# Patient Record
Sex: FEMALE | Race: WHITE | HISPANIC OR LATINO | URBAN - METROPOLITAN AREA
[De-identification: names, ages, dates, MRNs, and addresses within clinical notes are randomized per-mention and may not be internally consistent; named-entity substitution may affect disease eponyms.]

---

## 2023-06-16 ENCOUNTER — EMERGENCY (EMERGENCY)
Facility: HOSPITAL | Age: 83
LOS: 1 days | Discharge: HOME | End: 2023-06-16
Attending: EMERGENCY MEDICINE | Admitting: EMERGENCY MEDICINE
Payer: MEDICARE

## 2023-06-16 VITALS
SYSTOLIC BLOOD PRESSURE: 123 MMHG | OXYGEN SATURATION: 99 % | DIASTOLIC BLOOD PRESSURE: 57 MMHG | HEART RATE: 75 BPM | TEMPERATURE: 98 F | RESPIRATION RATE: 18 BRPM

## 2023-06-16 VITALS
OXYGEN SATURATION: 99 % | SYSTOLIC BLOOD PRESSURE: 152 MMHG | HEART RATE: 74 BPM | RESPIRATION RATE: 18 BRPM | DIASTOLIC BLOOD PRESSURE: 37 MMHG | TEMPERATURE: 98 F

## 2023-06-16 LAB
ALBUMIN SERPL ELPH-MCNC: 4.1 G/DL — SIGNIFICANT CHANGE UP (ref 3.3–5)
ALP SERPL-CCNC: 88 U/L — SIGNIFICANT CHANGE UP (ref 40–120)
ALT FLD-CCNC: 8 U/L — SIGNIFICANT CHANGE UP (ref 4–33)
ANION GAP SERPL CALC-SCNC: 14 MMOL/L — SIGNIFICANT CHANGE UP (ref 7–14)
APPEARANCE UR: CLEAR — SIGNIFICANT CHANGE UP
AST SERPL-CCNC: 17 U/L — SIGNIFICANT CHANGE UP (ref 4–32)
BASOPHILS # BLD AUTO: 0.04 K/UL — SIGNIFICANT CHANGE UP (ref 0–0.2)
BASOPHILS NFR BLD AUTO: 0.6 % — SIGNIFICANT CHANGE UP (ref 0–2)
BILIRUB SERPL-MCNC: <0.2 MG/DL — SIGNIFICANT CHANGE UP (ref 0.2–1.2)
BILIRUB UR-MCNC: NEGATIVE — SIGNIFICANT CHANGE UP
BUN SERPL-MCNC: 25 MG/DL — HIGH (ref 7–23)
CALCIUM SERPL-MCNC: 9.1 MG/DL — SIGNIFICANT CHANGE UP (ref 8.4–10.5)
CHLORIDE SERPL-SCNC: 104 MMOL/L — SIGNIFICANT CHANGE UP (ref 98–107)
CO2 SERPL-SCNC: 19 MMOL/L — LOW (ref 22–31)
COLOR SPEC: SIGNIFICANT CHANGE UP
CREAT SERPL-MCNC: 1.49 MG/DL — HIGH (ref 0.5–1.3)
DIFF PNL FLD: NEGATIVE — SIGNIFICANT CHANGE UP
EGFR: 34 ML/MIN/1.73M2 — LOW
EOSINOPHIL # BLD AUTO: 0.02 K/UL — SIGNIFICANT CHANGE UP (ref 0–0.5)
EOSINOPHIL NFR BLD AUTO: 0.3 % — SIGNIFICANT CHANGE UP (ref 0–6)
GAS PNL BLDV: SIGNIFICANT CHANGE UP
GLUCOSE SERPL-MCNC: 148 MG/DL — HIGH (ref 70–99)
GLUCOSE UR QL: NEGATIVE — SIGNIFICANT CHANGE UP
HCT VFR BLD CALC: 34.1 % — LOW (ref 34.5–45)
HGB BLD-MCNC: 10.8 G/DL — LOW (ref 11.5–15.5)
IANC: 5.22 K/UL — SIGNIFICANT CHANGE UP (ref 1.8–7.4)
IMM GRANULOCYTES NFR BLD AUTO: 0.3 % — SIGNIFICANT CHANGE UP (ref 0–0.9)
KETONES UR-MCNC: NEGATIVE — SIGNIFICANT CHANGE UP
LEUKOCYTE ESTERASE UR-ACNC: ABNORMAL
LYMPHOCYTES # BLD AUTO: 1.34 K/UL — SIGNIFICANT CHANGE UP (ref 1–3.3)
LYMPHOCYTES # BLD AUTO: 19.1 % — SIGNIFICANT CHANGE UP (ref 13–44)
MCHC RBC-ENTMCNC: 29 PG — SIGNIFICANT CHANGE UP (ref 27–34)
MCHC RBC-ENTMCNC: 31.7 GM/DL — LOW (ref 32–36)
MCV RBC AUTO: 91.7 FL — SIGNIFICANT CHANGE UP (ref 80–100)
MONOCYTES # BLD AUTO: 0.38 K/UL — SIGNIFICANT CHANGE UP (ref 0–0.9)
MONOCYTES NFR BLD AUTO: 5.4 % — SIGNIFICANT CHANGE UP (ref 2–14)
NEUTROPHILS # BLD AUTO: 5.22 K/UL — SIGNIFICANT CHANGE UP (ref 1.8–7.4)
NEUTROPHILS NFR BLD AUTO: 74.3 % — SIGNIFICANT CHANGE UP (ref 43–77)
NITRITE UR-MCNC: NEGATIVE — SIGNIFICANT CHANGE UP
NRBC # BLD: 0 /100 WBCS — SIGNIFICANT CHANGE UP (ref 0–0)
NRBC # FLD: 0 K/UL — SIGNIFICANT CHANGE UP (ref 0–0)
PH UR: 6 — SIGNIFICANT CHANGE UP (ref 5–8)
PLATELET # BLD AUTO: 332 K/UL — SIGNIFICANT CHANGE UP (ref 150–400)
POTASSIUM SERPL-MCNC: 4.7 MMOL/L — SIGNIFICANT CHANGE UP (ref 3.5–5.3)
POTASSIUM SERPL-SCNC: 4.7 MMOL/L — SIGNIFICANT CHANGE UP (ref 3.5–5.3)
PROT SERPL-MCNC: 7 G/DL — SIGNIFICANT CHANGE UP (ref 6–8.3)
PROT UR-MCNC: ABNORMAL
RBC # BLD: 3.72 M/UL — LOW (ref 3.8–5.2)
RBC # FLD: 13.4 % — SIGNIFICANT CHANGE UP (ref 10.3–14.5)
SODIUM SERPL-SCNC: 137 MMOL/L — SIGNIFICANT CHANGE UP (ref 135–145)
SP GR SPEC: 1.01 — SIGNIFICANT CHANGE UP (ref 1.01–1.05)
UROBILINOGEN FLD QL: SIGNIFICANT CHANGE UP
WBC # BLD: 7.02 K/UL — SIGNIFICANT CHANGE UP (ref 3.8–10.5)
WBC # FLD AUTO: 7.02 K/UL — SIGNIFICANT CHANGE UP (ref 3.8–10.5)

## 2023-06-16 PROCEDURE — 99284 EMERGENCY DEPT VISIT MOD MDM: CPT

## 2023-06-16 RX ORDER — SODIUM CHLORIDE 9 MG/ML
500 INJECTION INTRAMUSCULAR; INTRAVENOUS; SUBCUTANEOUS ONCE
Refills: 0 | Status: COMPLETED | OUTPATIENT
Start: 2023-06-16 | End: 2023-06-16

## 2023-06-16 RX ADMIN — SODIUM CHLORIDE 500 MILLILITER(S): 9 INJECTION INTRAMUSCULAR; INTRAVENOUS; SUBCUTANEOUS at 16:52

## 2023-06-16 NOTE — ED ADULT NURSE NOTE - NSFALLUNIVINTERV_ED_ALL_ED
Bed/Stretcher in lowest position, wheels locked, appropriate side rails in place/Call bell, personal items and telephone in reach/Instruct patient to call for assistance before getting out of bed/chair/stretcher/Non-slip footwear applied when patient is off stretcher/[place] to call system/Physically safe environment - no spills, clutter or unnecessary equipment/Purposeful proactive rounding/Room/bathroom lighting operational, light cord in reach

## 2023-06-16 NOTE — ED PROVIDER NOTE - DIFFERENTIAL DIAGNOSIS
Differential Diagnosis medication-induced (pt taking DM medications but not eating as much as usual), dehydration, infection

## 2023-06-16 NOTE — ED ADULT NURSE NOTE - OBJECTIVE STATEMENT
Pt received to rm 29 presents with confusion, was found to be hypoglycemic to 59 in the field. Pt currently a&ox4, ambulatory at baseline, skin intact, respirations even and unlabored, abd soft and non-distended, nontender to palpation. Pt blood glucose currently 151. 20g IV placed in left arm, labs drawn and sent, will await further orders and continue to monitor.

## 2023-06-16 NOTE — ED PROVIDER NOTE - NS ED ATTENDING STATEMENT MOD
No
This was a shared visit with the KIM. I reviewed and verified the documentation and independently performed the documented:

## 2023-06-16 NOTE — ED PROVIDER NOTE - CLINICAL SUMMARY MEDICAL DECISION MAKING FREE TEXT BOX
82-year-old female with past medical history of hypertension, diabetes on glipizide, no insulin, hypothyroidism, depression on amitriptyline presents emergency room for hypoglycemia. Dizziness and off balance, FS 50s, symptoms improved after getting dextrose. Denies hitting her head or LOC. Vital signs stable, FS 70 > 151, no focal deficit. Likely hypoglycemia secondary to sulfonylurea and poor po intake. Will check basic labs, urine, monitor FS and reassess. Dispo pending results.

## 2023-06-16 NOTE — ED PROVIDER NOTE - OBJECTIVE STATEMENT
Progress Note    Admit Date: 7/8/2020  Day: 1  Diet: Diet NPO, After Midnight    CC: Chest pain    Interval history: No overnight events since admission. Vitals stable and WNL (-133/66-70, T 97.9-98.1, HR 62-76, RR 16, SaO2 97-98% room air). 0.1 L urine output in the last 24 hours. Troponin < 0.01 x 2. Anemia (HgB 11.7). Low-normal potassium (K 3.6). This AM patient is not having chest pain and admits constipation, hand/foot swelling, burning/numbness in the distal extremities (chronic). Her chest pain appears to be reproduced with left arm movement. She denies SOB, abdominal pain, nausea, vomiting and diarrhea. Possible stress test today pending Covid test result.      Medications:     Scheduled Meds:   aspirin  81 mg Oral Daily    atorvastatin  10 mg Oral Nightly    docusate sodium  100 mg Oral TID    cetirizine  10 mg Oral Daily    hydroCHLOROthiazide  25 mg Oral Daily    insulin glargine  25 Units Subcutaneous Nightly    losartan  100 mg Oral Daily    pantoprazole  40 mg Oral QAM AC    pregabalin  50 mg Oral BID    traZODone  50 mg Oral Nightly    sodium chloride flush  10 mL Intravenous 2 times per day    insulin lispro  0-6 Units Subcutaneous TID WC    insulin lispro  0-3 Units Subcutaneous Nightly    enoxaparin  40 mg Subcutaneous Daily     Continuous Infusions:   dextrose       PRN Meds:sodium chloride flush, acetaminophen **OR** acetaminophen, polyethylene glycol, promethazine **OR** ondansetron, glucose, dextrose, glucagon (rDNA), dextrose, regadenoson, nitroGLYCERIN, morphine    Objective:   Vitals:   T-max:  Patient Vitals for the past 8 hrs:   BP Temp Temp src Pulse Resp SpO2 Weight   07/09/20 0910 (!) 140/69 97.8 °F (36.6 °C) Oral 78 16 98 % --   07/09/20 0537 133/70 97.9 °F (36.6 °C) Oral 62 16 97 % 190 lb 11.2 oz (86.5 kg)       Intake/Output Summary (Last 24 hours) at 7/9/2020 1018  Last data filed at 7/9/2020 0537  Gross per 24 hour   Intake 240 ml   Output 100 ml   Net 140 ml Review of Systems   Constitutional: Negative for fatigue and fever. HENT: Negative for ear pain and sinus pain. Eyes: Negative for pain. Respiratory: Negative for cough and shortness of breath. Cardiovascular: Positive for chest pain. Chest pain appears to be partially reproduced with left arm movement. Gastrointestinal: Positive for constipation. Negative for abdominal pain and diarrhea. Endocrine: Negative for polyuria. Genitourinary: Negative for flank pain and pelvic pain. Musculoskeletal: Negative for back pain. Skin: Negative for color change. Neurological: Positive for numbness. Negative for dizziness and headaches. Psychiatric/Behavioral: Negative for agitation, behavioral problems and confusion. Physical Exam  Constitutional:       General: She is awake. She is not in acute distress. Appearance: Normal appearance. HENT:      Head: Normocephalic and atraumatic. Nose: Nose normal.   Eyes:      General: Vision grossly intact. Gaze aligned appropriately. Right eye: No discharge. Left eye: No discharge. Extraocular Movements: Extraocular movements intact. Conjunctiva/sclera: Conjunctivae normal.   Neck:      Musculoskeletal: Full passive range of motion without pain, normal range of motion and neck supple. Cardiovascular:      Rate and Rhythm: Normal rate and regular rhythm. Pulses: Normal pulses. Heart sounds: Normal heart sounds. Pulmonary:      Effort: Pulmonary effort is normal.      Breath sounds: Normal breath sounds. Abdominal:      General: There is no distension. There are no signs of injury. Palpations: Abdomen is soft. Tenderness: There is no abdominal tenderness. Musculoskeletal: Normal range of motion. General: Swelling present. No deformity or signs of injury. Skin:     General: Skin is warm. Neurological:      General: No focal deficit present.       Mental Status: She is alert, oriented to person, place, and time and easily aroused. Mental status is at baseline. Motor: Motor function is intact. Coordination: Coordination is intact. Gait: Gait is intact. Psychiatric:         Attention and Perception: Attention and perception normal.         Mood and Affect: Mood and affect normal.         Speech: Speech normal.         Behavior: Behavior normal. Behavior is cooperative. Thought Content: Thought content normal.         Cognition and Memory: Cognition normal.         Judgment: Judgment normal.       LABS:    CBC:   Recent Labs     07/08/20 1819   WBC 6.8   HGB 11.7*   HCT 35.2*      MCV 92.2     Renal:    Recent Labs     07/08/20 1819 07/09/20  0438    139   K 4.2 3.6    105   CO2 25 24   BUN 12 11   CREATININE 0.6 0.7   GLUCOSE 187* 119*   CALCIUM 9.7 9.3   ANIONGAP 13 10     Hepatic:   Recent Labs     07/08/20 1819   AST 20   ALT 16   BILITOT 0.8   PROT 7.3   LABALBU 4.6   ALKPHOS 84     Troponin:   Recent Labs     07/08/20 1819 07/09/20  0438   TROPONINI <0.01 <0.01     BNP: No results for input(s): BNP in the last 72 hours. Lipids: No results for input(s): CHOL, HDL in the last 72 hours. Invalid input(s): LDLCALCU, TRIGLYCERIDE  ABGs:  No results for input(s): PHART, OJM6GWZ, PO2ART, YND7VMV, BEART, THGBART, M5RSPTGY, THT6GEK in the last 72 hours. INR:   Recent Labs     07/08/20 1819   INR 1.05     Lactate: No results for input(s): LACTATE in the last 72 hours. Cultures:  -----------------------------------------------------------------  RAD:   CT HEAD WO CONTRAST   Final Result   1. Normal noncontrast CT scan of the head. XR CHEST STANDARD (2 VW)   Final Result   1. No evidence of acute cardiopulmonary disease.        NM Cardiac Stress Test Nuclear Imaging    (Results Pending)     Assessment/Plan:     55F w/ PMH of T2DM, neuropathy, anemia, migraine, GERD, HLD, bipolar, fibromyalgia, and osteoarthritis presented 07/08/20 w/ chest pain. 1. Chest pain  - EKG (07/09/20): No ischemic changes. No new findings compared to 07/6/18.   - Troponin < 0.01 x 2.  - TTE (06/02/2014): EF 55%. No wall motion abnormalities. - Chol 181, , HDL 52, LDL 89 (01/28/20). - Plan: ASA 81 mg QD. Atorvastatin 10 mg QD. Stress test this AM pending Covid test result. 2. T2DM  - A1c 8.5 (01/28/20). - Glucose 119-187 since admit.   - Plan: Lantus 25 mg qPM. LDSSI. Hold home metformin. F/U A1c.     3. HTN  - -133/66-70.  - Plan: Losartan 100 mg QD. HCTZ 25 mg QD.    4. Neuropathy  - Lyrica 50 mg BID. 5. GERD  - Pantoprazole 40 mg QD. 6. Insomnia  - Trazodone 50 mg qPM.    7. Allergies  - Zyrtec 10 mg QD. 8. Constipation  - Colace 100 mg TID. 9. DVT PPX  - Lovenox 40 mg QD. 10. Diet  - NPO for now. 11. Discharge plan  - D/C to home when medically stable.     Code Status: Full code    Torrey Chris DO, PGY-2  07/09/20  10:18 AM    This patient has been staffed and discussed with Grady Arnett MD. 82-year-old female with past medical history of hypertension, diabetes on glipizide, no insulin, hypothyroidism, depression on amitriptyline presents emergency room for hypoglycemia.  Patient states that she went to work at the [employer] today when she was trying to check in people and she began to feel lightheaded and dizzy, like she was off balance.  Went to the managers office and sat down while they called 911.  Denies hitting her head or LOC.  States that symptoms have now improved.  When EMS arrived, patient was found to be hypoglycemic into the 50s.  Patient reports complete resolution of symptoms at this time.  Of note, niece is at bedside states that patient tried to go to work last night at 7 PM and was little confused, 911 was called for a wellness check but was fine so did not go into hospital.

## 2023-06-16 NOTE — ED PROVIDER NOTE - ATTENDING APP SHARED VISIT CONTRIBUTION OF CARE
Dr. Lin: This is an 82-year-old female with hypertension, diabetes on oral medications, hypothyroidism, depression, brought to the emergency department after episode of dizziness and hypoglycemia.  Niece at the bedside suspects that patient likely took her diabetes medications and did not eat enough, which has happened previously.  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  Patient feels complete improvement in her symptoms now that she has been given glucose.  She denies nausea, vomiting, diarrhea, chest pain, shortness of breath.  Patient last took her diabetes medications (including glipizide) earlier this morning.  On exam pt overall well appearing, in NAD, A+Ox3, heart RRR, lungs CTAB, abd NTND, extremities without swelling, strength equal and grossly intact in all extremities and skin without rash.

## 2023-06-16 NOTE — ED ADULT NURSE REASSESSMENT NOTE - NS ED NURSE REASSESS COMMENT FT1
Break coverage RN: pt A&Ox4 resting on stretcher. Respirations even and unlabored. IVF administered per order, IV site patent. No redness or swelling noted. Pt offers no complaints at this time. No acute distress noted. bed in lowest, safety maintained.

## 2023-06-16 NOTE — ED ADULT TRIAGE NOTE - CHIEF COMPLAINT QUOTE
pt reports feels dizzy and confused fo the past 2 days, per EMS, pt was found with Fs of 59 received 2  rounds of oral glucose. hx of DM, HTN. Received 2 cups of apple juice and crackers in triage.

## 2023-06-16 NOTE — ED PROVIDER NOTE - PATIENT PORTAL LINK FT
You can access the FollowMyHealth Patient Portal offered by [facility] by registering at the following website: [email or web address]. By joining [facility]’s FollowMyHealth portal, you will also be able to view your health information using other applications (apps) compatible with our system.

## 2023-06-16 NOTE — ED PROVIDER NOTE - CONSTITUTIONAL MENTATION
awake/alert/oriented to person, place, time/situation A+Ox3/awake/alert/oriented to person, place, time/situation

## 2023-06-16 NOTE — ED PROVIDER NOTE - NSFOLLOWUPINSTRUCTIONS_ED_ALL_ED_FT
Today you were seen in the ER for dizziness caused by low blood sugar.    Monitor your glucose closely. Make sure you are eating a well balanced meal.     Hold your Glipizide tomorrow morning and follow up with your endocrinologist as soon as possible.     Hypoglycemia    Hypoglycemia occurs when the glucose (sugar) level in your blood is too low. Symptoms include confusion, weakness, or fainting. You may even appear to be having a stroke. Take medications exactly as prescribed by your health care professional. Maintain a healthy lifestyle and follow up with your primary care physician.    SEEK IMMEDIATE MEDICAL CARE IF YOU HAVE ANY OF THE FOLLOWING SYMPTOMS: weakness, fainting, change in mental status, nausea or vomiting, fruity smell to your breath, or any signs of dehydration.    Advance activity as tolerated.      Continue all previously prescribed medications as directed unless otherwise instructed.      Follow up with your primary care physician in 48-72 hours- bring copies of your results.

## 2023-06-16 NOTE — ED PROVIDER NOTE - PROGRESS NOTE DETAILS
SNEHA Araiza: PARVEEN on labs, will give light hydration, FS stable, will continue to monitor, patient eating/drinking, carol Roque will be staying with patient tonight and monitor closely, will hold sulfonylurea and have patient follow up with endo early next week to review medications as patient is not eating/drinking well so medications will likely need to be adjusted.

## 2023-06-18 LAB
CULTURE RESULTS: SIGNIFICANT CHANGE UP
SPECIMEN SOURCE: SIGNIFICANT CHANGE UP

## 2024-02-28 ENCOUNTER — INPATIENT (INPATIENT)
Facility: HOSPITAL | Age: 84
LOS: 4 days | Discharge: HOME HEALTH | DRG: 884 | End: 2024-03-04
Attending: INTERNAL MEDICINE | Admitting: INTERNAL MEDICINE
Payer: MEDICARE

## 2024-02-28 VITALS
SYSTOLIC BLOOD PRESSURE: 143 MMHG | RESPIRATION RATE: 18 BRPM | HEART RATE: 70 BPM | TEMPERATURE: 98 F | DIASTOLIC BLOOD PRESSURE: 52 MMHG | WEIGHT: 95.02 LBS | OXYGEN SATURATION: 97 %

## 2024-02-28 DIAGNOSIS — R55 SYNCOPE AND COLLAPSE: ICD-10-CM

## 2024-02-28 LAB
ALBUMIN SERPL ELPH-MCNC: 4.3 G/DL — SIGNIFICANT CHANGE UP (ref 3.3–5)
ALP SERPL-CCNC: 64 U/L — SIGNIFICANT CHANGE UP (ref 40–120)
ALT FLD-CCNC: 6 U/L — LOW (ref 10–45)
ANION GAP SERPL CALC-SCNC: 13 MMOL/L — SIGNIFICANT CHANGE UP (ref 5–17)
ANION GAP SERPL CALC-SCNC: 15 MMOL/L — SIGNIFICANT CHANGE UP (ref 5–17)
APPEARANCE UR: CLEAR — SIGNIFICANT CHANGE UP
AST SERPL-CCNC: 19 U/L — SIGNIFICANT CHANGE UP (ref 10–40)
BACTERIA # UR AUTO: NEGATIVE /HPF — SIGNIFICANT CHANGE UP
BASOPHILS # BLD AUTO: 0.02 K/UL — SIGNIFICANT CHANGE UP (ref 0–0.2)
BASOPHILS NFR BLD AUTO: 0.2 % — SIGNIFICANT CHANGE UP (ref 0–2)
BILIRUB SERPL-MCNC: 0.4 MG/DL — SIGNIFICANT CHANGE UP (ref 0.2–1.2)
BILIRUB UR-MCNC: NEGATIVE — SIGNIFICANT CHANGE UP
BUN SERPL-MCNC: 25 MG/DL — HIGH (ref 7–23)
BUN SERPL-MCNC: 27 MG/DL — HIGH (ref 7–23)
CALCIUM SERPL-MCNC: 9.4 MG/DL — SIGNIFICANT CHANGE UP (ref 8.4–10.5)
CALCIUM SERPL-MCNC: 9.6 MG/DL — SIGNIFICANT CHANGE UP (ref 8.4–10.5)
CAST: 11 /LPF — HIGH (ref 0–4)
CHLORIDE SERPL-SCNC: 100 MMOL/L — SIGNIFICANT CHANGE UP (ref 96–108)
CHLORIDE SERPL-SCNC: 103 MMOL/L — SIGNIFICANT CHANGE UP (ref 96–108)
CO2 SERPL-SCNC: 21 MMOL/L — LOW (ref 22–31)
CO2 SERPL-SCNC: 21 MMOL/L — LOW (ref 22–31)
COLOR SPEC: YELLOW — SIGNIFICANT CHANGE UP
CREAT SERPL-MCNC: 1.41 MG/DL — HIGH (ref 0.5–1.3)
CREAT SERPL-MCNC: 1.61 MG/DL — HIGH (ref 0.5–1.3)
DIFF PNL FLD: NEGATIVE — SIGNIFICANT CHANGE UP
EGFR: 31 ML/MIN/1.73M2 — LOW
EGFR: 37 ML/MIN/1.73M2 — LOW
EOSINOPHIL # BLD AUTO: 0.01 K/UL — SIGNIFICANT CHANGE UP (ref 0–0.5)
EOSINOPHIL NFR BLD AUTO: 0.1 % — SIGNIFICANT CHANGE UP (ref 0–6)
FLUAV AG NPH QL: SIGNIFICANT CHANGE UP
FLUBV AG NPH QL: SIGNIFICANT CHANGE UP
GLUCOSE BLDC GLUCOMTR-MCNC: 104 MG/DL — HIGH (ref 70–99)
GLUCOSE BLDC GLUCOMTR-MCNC: 64 MG/DL — LOW (ref 70–99)
GLUCOSE SERPL-MCNC: 120 MG/DL — HIGH (ref 70–99)
GLUCOSE SERPL-MCNC: 77 MG/DL — SIGNIFICANT CHANGE UP (ref 70–99)
GLUCOSE UR QL: NEGATIVE MG/DL — SIGNIFICANT CHANGE UP
HCT VFR BLD CALC: 33.1 % — LOW (ref 34.5–45)
HGB BLD-MCNC: 10.7 G/DL — LOW (ref 11.5–15.5)
IMM GRANULOCYTES NFR BLD AUTO: 0.3 % — SIGNIFICANT CHANGE UP (ref 0–0.9)
KETONES UR-MCNC: ABNORMAL MG/DL
LEUKOCYTE ESTERASE UR-ACNC: NEGATIVE — SIGNIFICANT CHANGE UP
LYMPHOCYTES # BLD AUTO: 1.41 K/UL — SIGNIFICANT CHANGE UP (ref 1–3.3)
LYMPHOCYTES # BLD AUTO: 15.5 % — SIGNIFICANT CHANGE UP (ref 13–44)
MAGNESIUM SERPL-MCNC: 2.2 MG/DL — SIGNIFICANT CHANGE UP (ref 1.6–2.6)
MCHC RBC-ENTMCNC: 29.2 PG — SIGNIFICANT CHANGE UP (ref 27–34)
MCHC RBC-ENTMCNC: 32.3 GM/DL — SIGNIFICANT CHANGE UP (ref 32–36)
MCV RBC AUTO: 90.2 FL — SIGNIFICANT CHANGE UP (ref 80–100)
MONOCYTES # BLD AUTO: 0.34 K/UL — SIGNIFICANT CHANGE UP (ref 0–0.9)
MONOCYTES NFR BLD AUTO: 3.7 % — SIGNIFICANT CHANGE UP (ref 2–14)
NEUTROPHILS # BLD AUTO: 7.28 K/UL — SIGNIFICANT CHANGE UP (ref 1.8–7.4)
NEUTROPHILS NFR BLD AUTO: 80.2 % — HIGH (ref 43–77)
NITRITE UR-MCNC: NEGATIVE — SIGNIFICANT CHANGE UP
NRBC # BLD: 0 /100 WBCS — SIGNIFICANT CHANGE UP (ref 0–0)
PH UR: 5 — SIGNIFICANT CHANGE UP (ref 5–8)
PHOSPHATE SERPL-MCNC: 3.8 MG/DL — SIGNIFICANT CHANGE UP (ref 2.5–4.5)
PLATELET # BLD AUTO: 271 K/UL — SIGNIFICANT CHANGE UP (ref 150–400)
POTASSIUM SERPL-MCNC: 4.9 MMOL/L — SIGNIFICANT CHANGE UP (ref 3.5–5.3)
POTASSIUM SERPL-MCNC: 5.4 MMOL/L — HIGH (ref 3.5–5.3)
POTASSIUM SERPL-SCNC: 4.9 MMOL/L — SIGNIFICANT CHANGE UP (ref 3.5–5.3)
POTASSIUM SERPL-SCNC: 5.4 MMOL/L — HIGH (ref 3.5–5.3)
PROT SERPL-MCNC: 7.8 G/DL — SIGNIFICANT CHANGE UP (ref 6–8.3)
PROT UR-MCNC: 30 MG/DL
RBC # BLD: 3.67 M/UL — LOW (ref 3.8–5.2)
RBC # FLD: 15.3 % — HIGH (ref 10.3–14.5)
RBC CASTS # UR COMP ASSIST: 0 /HPF — SIGNIFICANT CHANGE UP (ref 0–4)
REVIEW: SIGNIFICANT CHANGE UP
RSV RNA NPH QL NAA+NON-PROBE: SIGNIFICANT CHANGE UP
SARS-COV-2 RNA SPEC QL NAA+PROBE: SIGNIFICANT CHANGE UP
SODIUM SERPL-SCNC: 134 MMOL/L — LOW (ref 135–145)
SODIUM SERPL-SCNC: 139 MMOL/L — SIGNIFICANT CHANGE UP (ref 135–145)
SP GR SPEC: 1.02 — SIGNIFICANT CHANGE UP (ref 1–1.03)
SQUAMOUS # UR AUTO: 1 /HPF — SIGNIFICANT CHANGE UP (ref 0–5)
TROPONIN T, HIGH SENSITIVITY RESULT: 18 NG/L — SIGNIFICANT CHANGE UP (ref 0–51)
TSH SERPL-MCNC: 0.5 UIU/ML — SIGNIFICANT CHANGE UP (ref 0.27–4.2)
UROBILINOGEN FLD QL: 1 MG/DL — SIGNIFICANT CHANGE UP (ref 0.2–1)
WBC # BLD: 9.09 K/UL — SIGNIFICANT CHANGE UP (ref 3.8–10.5)
WBC # FLD AUTO: 9.09 K/UL — SIGNIFICANT CHANGE UP (ref 3.8–10.5)
WBC UR QL: 1 /HPF — SIGNIFICANT CHANGE UP (ref 0–5)

## 2024-02-28 PROCEDURE — 70496 CT ANGIOGRAPHY HEAD: CPT | Mod: 26,MC

## 2024-02-28 PROCEDURE — 99285 EMERGENCY DEPT VISIT HI MDM: CPT

## 2024-02-28 PROCEDURE — 71045 X-RAY EXAM CHEST 1 VIEW: CPT | Mod: 26

## 2024-02-28 PROCEDURE — 70450 CT HEAD/BRAIN W/O DYE: CPT | Mod: 26,MC,XU

## 2024-02-28 PROCEDURE — 70498 CT ANGIOGRAPHY NECK: CPT | Mod: 26,MC

## 2024-02-28 RX ORDER — VENLAFAXINE HCL 75 MG
37.5 CAPSULE, EXT RELEASE 24 HR ORAL ONCE
Refills: 0 | Status: COMPLETED | OUTPATIENT
Start: 2024-02-28 | End: 2024-02-28

## 2024-02-28 RX ORDER — DEXTROSE 50 % IN WATER 50 %
15 SYRINGE (ML) INTRAVENOUS ONCE
Refills: 0 | Status: DISCONTINUED | OUTPATIENT
Start: 2024-02-28 | End: 2024-03-04

## 2024-02-28 RX ORDER — SODIUM CHLORIDE 9 MG/ML
1000 INJECTION, SOLUTION INTRAVENOUS ONCE
Refills: 0 | Status: COMPLETED | OUTPATIENT
Start: 2024-02-28 | End: 2024-02-28

## 2024-02-28 RX ORDER — GLUCAGON INJECTION, SOLUTION 0.5 MG/.1ML
1 INJECTION, SOLUTION SUBCUTANEOUS ONCE
Refills: 0 | Status: DISCONTINUED | OUTPATIENT
Start: 2024-02-28 | End: 2024-03-04

## 2024-02-28 RX ORDER — HEPARIN SODIUM 5000 [USP'U]/ML
5000 INJECTION INTRAVENOUS; SUBCUTANEOUS EVERY 8 HOURS
Refills: 0 | Status: DISCONTINUED | OUTPATIENT
Start: 2024-02-28 | End: 2024-03-04

## 2024-02-28 RX ORDER — DEXTROSE 50 % IN WATER 50 %
25 SYRINGE (ML) INTRAVENOUS ONCE
Refills: 0 | Status: DISCONTINUED | OUTPATIENT
Start: 2024-02-28 | End: 2024-03-04

## 2024-02-28 RX ORDER — INSULIN LISPRO 100/ML
VIAL (ML) SUBCUTANEOUS
Refills: 0 | Status: DISCONTINUED | OUTPATIENT
Start: 2024-02-28 | End: 2024-03-04

## 2024-02-28 RX ORDER — SODIUM CHLORIDE 9 MG/ML
1000 INJECTION, SOLUTION INTRAVENOUS
Refills: 0 | Status: DISCONTINUED | OUTPATIENT
Start: 2024-02-28 | End: 2024-03-04

## 2024-02-28 RX ORDER — DEXTROSE 50 % IN WATER 50 %
12.5 SYRINGE (ML) INTRAVENOUS ONCE
Refills: 0 | Status: DISCONTINUED | OUTPATIENT
Start: 2024-02-28 | End: 2024-03-04

## 2024-02-28 RX ORDER — VENLAFAXINE HCL 75 MG
75 CAPSULE, EXT RELEASE 24 HR ORAL DAILY
Refills: 0 | Status: DISCONTINUED | OUTPATIENT
Start: 2024-02-28 | End: 2024-03-04

## 2024-02-28 RX ORDER — QUETIAPINE FUMARATE 200 MG/1
25 TABLET, FILM COATED ORAL ONCE
Refills: 0 | Status: COMPLETED | OUTPATIENT
Start: 2024-02-28 | End: 2024-02-28

## 2024-02-28 RX ORDER — LEVOTHYROXINE SODIUM 125 MCG
50 TABLET ORAL DAILY
Refills: 0 | Status: DISCONTINUED | OUTPATIENT
Start: 2024-02-28 | End: 2024-03-04

## 2024-02-28 RX ORDER — INSULIN LISPRO 100/ML
VIAL (ML) SUBCUTANEOUS AT BEDTIME
Refills: 0 | Status: DISCONTINUED | OUTPATIENT
Start: 2024-02-28 | End: 2024-03-04

## 2024-02-28 RX ADMIN — QUETIAPINE FUMARATE 25 MILLIGRAM(S): 200 TABLET, FILM COATED ORAL at 22:19

## 2024-02-28 RX ADMIN — SODIUM CHLORIDE 1000 MILLILITER(S): 9 INJECTION, SOLUTION INTRAVENOUS at 12:02

## 2024-02-28 RX ADMIN — Medication 37.5 MILLIGRAM(S): at 17:59

## 2024-02-28 RX ADMIN — HEPARIN SODIUM 5000 UNIT(S): 5000 INJECTION INTRAVENOUS; SUBCUTANEOUS at 22:20

## 2024-02-28 NOTE — ED PROVIDER NOTE - ATTENDING CONTRIBUTION TO CARE
Attending MD Browning: I personally have seen and examined this patient.  Fellow note reviewed and agree on plan of care and except where noted.  See below for details.     Seen in     TO BE COMPLETED

## 2024-02-28 NOTE — H&P ADULT - NSHPPHYSICALEXAM_GEN_ALL_CORE
General: WN/WD NAD  PERRLA  Neurology: A&Ox1  Respiratory: CTA B/L  CV: RRR, S1S2, no murmurs, rubs or gallops  Abdominal: Soft, NT, ND +BS, Last BM  Extremities: edema+

## 2024-02-28 NOTE — ED PROVIDER NOTE - PROGRESS NOTE DETAILS
Attending MD Browning: Called to bedside by Mya lam, who reports patient is reporting feeling short of breath.  Patient laying in stretcher, no increased work of breathing, RR in teens, Sat'ing 100% on RA.  Instructed patient to take a few deep breaths with improvement of symptoms. Gabriela: spoke with neuro they will see pt, consult to see if pt should stay for MRI  Paged nsg for aneurysm though likely will be nothing to do Gabriela: spoke with neuro they will see pt, consult to see if pt should stay for MRI  Paged nsg for aneurysm though likely will be nothing to do    Spoke with HCP and we think the pt should be admitted as she's an unsafe discharge, pt doesn't have a dementia doctor and pts dementia is rapidly progressing, unclear if pt has PARVEEN unknown baseline Cr. Pt slightly hypoglycemic here, likely from fasting as pt not on hypoglycemic med pt not symptomatic and tolerating PO will rpt FS Gabriela: haven't heard from nsg, paged again, called [facility] and they're signing out  Pts HCP asking to give pt something to help her sleep in the evening before HCP leaves. Will give a small dose of quetiapine Gabriela: was able to speak with neurosurgery about pts aneurysm on CT, they're aware of the pt and will see pt

## 2024-02-28 NOTE — ED ADULT NURSE NOTE - OBJECTIVE STATEMENT
84 y/o F A&Ox2 w/ PMH of hypothyroidism, DM presents to ED complaining of near syncopal episode. Pt BIBEMS from home, per EMS, patient was trying to read her blood glucose and she was unable to get the glucose reading so she called her caretaker who lives down the block to come help her the patient then states afterwards she started to feel a little short of breath and a little lightheaded when she sat on the couch and she said she this for short period of time lost consciousness.  The patient denies being on insulin. Per pt caregiver, pt "has not been herself" the last 2 days. Caretaker noted slight right sided facial droop and states that when the pt called her, she "didn't sound herself." Pt gross neuro intact, strength and sensation intact. Patient denies any fevers or chills, URI symptoms, cough, chest pain, palpitations, abdominal pain, nausea or vomiting, diarrhea, dark or bloody stools, dysuria or urinary urgency, leg swelling, rash.

## 2024-02-28 NOTE — H&P ADULT - ASSESSMENT
83 yr  woman with a PMHx significant for dementia (aa0x2 baseline), hypothyroidism, HTN, DM presented to the ED for acute on chronic cognitive changes and aphasia. Patient accompanied by HCP/caretaker who aided in history. As per HCP, she received a call from the patient yesterday stating "I passed out". Patient was found on her couch with her eyes closed, awake but more confused compared to baseline. Patient stated that she went to the kitchen to test her sugar and began feeling dizzy and "passed out" however never lost consciousness. No shaking noted during the episode, denied HA, numbness, acute weakness, vision changes. Patient complained of transient left eye pain and malaise.  HCP noted transient left sided facial droop.     1 AMS, worsening dementia, FTT   - likely worsening dementia  - neuro fu appreciated  - psych consult in am  - PT and rehab planing       2 DM  - monitor FS  - ISS    3 Lovenox for DVT pophylaxis  83 yr  woman with a PMHx significant for dementia (aa0x2 baseline), hypothyroidism, HTN, DM presented to the ED for acute on chronic cognitive changes and aphasia. Patient accompanied by HCP/caretaker who aided in history. As per HCP, she received a call from the patient yesterday stating "I passed out". Patient was found on her couch with her eyes closed, awake but more confused compared to baseline. Patient stated that she went to the kitchen to test her sugar and began feeling dizzy and "passed out" however never lost consciousness. No shaking noted during the episode, denied HA, numbness, acute weakness, vision changes. Patient complained of transient left eye pain and malaise.  HCP noted transient left sided facial droop.     1 AMS, worsening dementia, FTT   - likely worsening dementia  - neuro fu appreciated  - psych consult in am  - PT and rehab planing       2 DM  - monitor FS  - ISS    3 PARVEEN   - monitor cr  - renal fu     Heparin sc  for DVT prophylaxis

## 2024-02-28 NOTE — H&P ADULT - HISTORY OF PRESENT ILLNESS
83y (1940) RH woman with a PMHx significant for dementia (aa0x2 baseline), hypothyroidism, HTN, DM presented to the ED for acute on chronic cognitive changes and aphasia. Patient accompanied by HCP/caretaker who aided in history. As per HCP, she received a call from the patient yesterday stating "I passed out". Patient was found on her couch with her eyes closed, awake but more confused compared to baseline. Patient stated that she went to the kitchen to test her sugar and began feeling dizzy and "passed out" however never lost consciousness. No shaking noted during the episode, denied HA, numbness, acute weakness, vision changes. Patient complained of transient left eye pain and malaise.  HCP noted transient left sided facial droop.  83 yr  woman with a PMHx significant for dementia (aa0x2 baseline), hypothyroidism, HTN, DM presented to the ED for acute on chronic cognitive changes and aphasia. Patient accompanied by HCP/caretaker who aided in history. As per HCP, she received a call from the patient yesterday stating "I passed out". Patient was found on her couch with her eyes closed, awake but more confused compared to baseline. Patient stated that she went to the kitchen to test her sugar and began feeling dizzy and "passed out" however never lost consciousness. No shaking noted during the episode, denied HA, numbness, acute weakness, vision changes. Patient complained of transient left eye pain and malaise.  HCP noted transient left sided facial droop.

## 2024-02-28 NOTE — ED PROVIDER NOTE - OBJECTIVE STATEMENT
83-year-old female with past medical history of hypothyroidism on levothyroxine, type 2 diabetes on pioglitazone, presents by EMS where the story was that the patient was trying to read her blood glucose and she was unable to get the glucose reading so she called her caretaker who lives down the block to come help her the patient then states afterwards she started to feel a little short of breath and a little lightheaded when she sat on the couch and she said she this for short period of time lost consciousness.  The patient denies being on insulin.  The patient has a component of dementia she is AAO x 2 here unclear what her baseline mental status is the family is coming in shortly.  Patient denies any fevers or chills, URI symptoms, cough, chest pain, palpitations, abdominal pain, nausea or vomiting, diarrhea, dark or bloody stools, dysuria or urinary urgency, leg swelling, rash. 83-year-old female with past medical history of hypothyroidism on levothyroxine, type 2 diabetes on pioglitazone, presents by EMS where the story was that the patient was trying to read her blood glucose and she was unable to get the glucose reading so she called her caretaker who lives down the block to come help her the patient then states afterwards she started to feel a little short of breath and a little lightheaded when she sat on the couch and she said she this for short period of time lost consciousness. The patient denies being on insulin. The patient has a component of dementia she is AAO x 2 here unclear what her baseline mental status is but the HCP Mya (documentation in pts paper chart) is now here and states that for the past week she feels like the pt has been off, maybe sometimes babbling when speaking, has been saying "I feel like I have to yawn but I can't", pt has possibly a new L facial droop noticed this morning though not a clear last known well. Patient denies any fevers or chills, URI symptoms, cough, chest pain, palpitations, abdominal pain, nausea or vomiting, diarrhea, dark or bloody stools, dysuria or urinary urgency, leg swelling, rash. 83-year-old female with past medical history of hypothyroidism on levothyroxine, type 2 diabetes on pioglitazone, presents by EMS where the story was that the patient was trying to read her blood glucose and she was unable to get the glucose reading so she called her caretaker who lives down the block to come help her the patient then states afterwards she started to feel a little short of breath and a little lightheaded when she sat on the couch and she said she this for short period of time lost consciousness. The patient denies being on insulin. The patient has a component of dementia she is AAO x 2 here unclear what her baseline mental status is but the HCP Mya (documentation in pts paper chart) is now here and states that for the past week she feels like the pt has been off, maybe sometimes babbling when speaking, has been saying "I feel like I have to yawn but I can't", pt has possibly a new L facial droop noticed this morning though not a clear last known well. Patient denies any fevers or chills, URI symptoms, cough, chest pain, palpitations, abdominal pain, nausea or vomiting, diarrhea, dark or bloody stools, dysuria or urinary urgency, leg swelling, rash.    Upon speaking further with HCP/caretaker, the pts dementia seems to be rapidly progressing over the past 2 months. States pts best friend passed away in January and since then she's been declining fast and caretaker has a family to take care of and the pt should get admitted for concern she's unable to care for herself at home. Pt was started on venlafaxine 2 days ago for panic attacks. 83-year-old female with past medical history of hypothyroidism on levothyroxine, type 2 diabetes on pioglitazone, presents by EMS where the story was that the patient was trying to read her blood glucose and she was unable to get the glucose reading so she called her caretaker who lives down the block to come help her the patient then states afterwards she started to feel a little short of breath and a little lightheaded when she sat on the couch and she said she this for short period of time lost consciousness. The patient denies being on insulin. FS 90s per EMS. The patient has a component of dementia she is AAO x 2 here unclear what her baseline mental status is but the HCP Mya (documentation in pts paper chart) is now here and states that for the past week she feels like the pt has been off, maybe sometimes babbling when speaking, has been saying "I feel like I have to yawn but I can't", pt has possibly a new L facial droop noticed this morning though not a clear last known well. Patient denies any fevers or chills, URI symptoms, cough, chest pain, palpitations, abdominal pain, nausea or vomiting, diarrhea, dark or bloody stools, dysuria or urinary urgency, leg swelling, rash.    Upon speaking further with HCP/caretaker, the pts dementia seems to be rapidly progressing over the past 2 months. States pts best friend passed away in January and since then she's been declining fast and caretaker has a family to take care of and the pt should get admitted for concern she's unable to care for herself at home. Pt was started on venlafaxine 2 days ago for panic attacks.

## 2024-02-28 NOTE — ED ADULT NURSE NOTE - ISOLATION TYPE:
"              After Visit Summary   3/23/2018    Zbigniew Dumas    MRN: [id number]           Patient Information     Date Of Birth          2014        Visit Information        Provider Department      3/23/2018 9:15 AM Ceci Grimm MD [facility]        Today's Diagnoses     Molluscum contagiosum    -  1       Follow-ups after your visit        Who to contact     If you have questions or need follow up information about today's clinic visit or your schedule please contact [facility] directly at [phone].  Normal or non-critical lab and imaging results will be communicated to you by [employer] [facility], letter or phone within 4 business days after the clinic has received the results. If you do not hear from us within 7 days, please contact the clinic through [employer] or phone. If you have a critical or abnormal lab result, we will notify you by phone as soon as possible.  Submit refill requests through [employer] or call your pharmacy and they will forward the refill request to us. Please allow 3 business days for your refill to be completed.          Additional Information About Your Visit        MyChart Information     [employer] lets you send messages to your doctor, view your test results, renew your prescriptions, schedule appointments and more. To sign up, go to [email or web address], contact your [facility] clinic or call [phone] during business hours.            Care EveryWhere ID     This is your Care EveryWhere ID. This could be used by other organizations to access your [facility] medical records  [id number]        Your Vitals Were     Pulse Temperature Height Pulse Oximetry BMI (Body Mass Index)       85 98.2  F (36.8  C) (Oral) 3' 10\" (1.168 m) 97% 15.95 kg/m2        Blood Pressure from Last 3 Encounters:   03/23/18 106/63   10/27/17 98/72   10/24/17 101/69    Weight from Last 3 Encounters:   03/23/18 48 lb (21.8 kg) (80 %)*   10/27/17 44 lb 9.6 oz (20.2 kg) (76 %)* "   10/24/17 44 lb (20 kg) (74 %)*     * Growth percentiles are based on [facility] 2-20 Years data.              We Performed the Following     DESTRUCT BENIGN LESION, UP TO 14        Primary Care Provider Office Phone # Fax #    Ceci Grimm [phone]       [address]  [facility] [address]        Equal Access to Services     [facility]: Hadii aad ku hadasho Soomaali, waaxda luqadaha, qaybta kaalmada adeegyada, waxay idiin hayaan adeeg kharash la'aan . So [facility] [phone].    ATENCIÓN: Si habla espelaine, tiene a mcrae disposición servicios gratuitos de asistencia lingüística. Llame al [phone].    We comply with applicable federal civil rights laws and [place] laws. We do not discriminate on the basis of race, color, national origin, age, disability, sex, sexual orientation, or gender identity.            Thank you!     Thank you for choosing [facility]  for your care. Our goal is always to provide you with excellent care. Hearing back from our patients is one way we can continue to improve our services. Please take a few minutes to complete the written survey that you may receive in the mail after your visit with us. Thank you!             Your Updated Medication List - Protect others around you: Learn how to safely use, store and throw away your medicines at www.disposemymeds.org.          This list is accurate as of 3/23/18 10:47 AM.  Always use your most recent med list.                   Brand Name Dispense Instructions for use Diagnosis    acetaminophen 32 mg/mL solution    TYLENOL     Take 15 mg/kg by mouth every 4 hours as needed for fever or mild pain        ADVIL PO           Multi-vitamin Tabs tablet      Take 1 tablet by mouth daily    Encounter for routine child health examination w/o abnormal findings          None

## 2024-02-28 NOTE — ED PROVIDER NOTE - CLINICAL SUMMARY MEDICAL DECISION MAKING FREE TEXT BOX
Gabriela: Impression: Likely orthostatic hypotension from being dehydrated secondary to type 2 diabetes, though will assess for infectious causes, patient denies any room spinning dizziness no nystagmus here low suspicion for vertigo or primary neuro etiology as she also has no focal neurologic deficits.  Plan for fingerstick, labs, chest x-ray, EKG and Trope, urine, viral swab, gentle fluids.  Reassess. Gabriela: Impression: Possible subacute CVA (difficult to discern LKN as pts been off for the past week per HCP/caretaker) though will assess for infectious/metabolic causes, patient denies any room spinning dizziness no nystagmus here low suspicion for vertigo or primary neuro etiology as she also has no focal neurologic deficits.  Plan for CT's/CTA's/ fingerstick, labs, chest x-ray, EKG and Trop, urine, viral swab, gentle fluids.  Reassess.

## 2024-02-28 NOTE — CONSULT NOTE ADULT - ATTENDING COMMENTS
DOS 2/29  seen in ED  Briefly    83y RH woman with dementia (aa0x2 baseline), hypothyroidism, HTN, DM presented to the ED for acute on chronic cognitive changes and aphasia. Patient accompanied by HCP/caretaker who aided in history. As per HCP, she received a call from the patient yesterday stating "I passed out". Patient was found on her couch with her eyes closed, awake but more confused compared to baseline. Patient stated that she went to the kitchen to test her sugar and began feeling dizzy and "passed out" however never lost consciousness. No shaking noted during the episode, denied HA, numbness, acute weakness, vision changes. Patient complained of transient left eye pain and malaise.  HCP noted transient left sided facial droop.   As per HCP, on 1/24 patient lost her best friend and since that date had significant cognitive decline, difficulty taking care of herself, loss of interest in activities.   HCP stated 7 days ago began noticing speech stuttering and worsening forgetfulness however no focal deficits noted.   As per HCP, patient was dx with dementia by PCP in the past as she was having forgetfulness. Had not had MRI brain in the past. Patient is not on aspirin. no toxic habits.   CTh with advanced chronic microvascular changes   CTA H/N with 4.9x3.8mm aneurysm with wide 3mm neck   CD neg     Impression:   1) Acute on chronic cognitive decline with superimposed dementia, with possible broca type aphasia for the past one week concerning for cerebral ischemia vs toxic/metabolic/infectious etiology.   2) AMS  3) Syncope of unclear etiology.     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        Recommendations:     Imaging/Labs  - MRI brain  - neurosx eval   - TTE with bubble study and telemetry   - Check UA, Utox, TSH, T3/T4, RPR, antithyroglobulin Ab, TPO Ab, vitamin B1, B6, B12, folate, lactate, Vitamin D 25 OH, creatinine kinase, ammonia, HbA1C and Lipid Panel.  - Orthostatics  - f/u psych   - seroquel PRN for agitation make sure QTC < 500   - routine EEG   - Would start aspirin 81MG PO daily as secondary prevention. IF MRI negative can consider discontinuing   - Would start Atorvastatin 80MG QHS, titrate to LDL<70.  IF MRI negative can consider discontinuing   - Telemonitoring; Neurochecks and vital signs per unit protocol, Q4H  - Normotension   - BG goal <180, avoid hypoglycemia  - Fall, aspiration precautions  - PT/OT rudolph Zuñiga MD  Vascular Neurology  Office: [phone]

## 2024-02-28 NOTE — H&P ADULT - NSHPLABSRESULTS_GEN_ALL_CORE
Lab Results:  CBC  CBC Full  -  ( 28 Feb 2024 12:16 )  WBC Count : 9.09 K/uL  RBC Count : 3.67 M/uL  Hemoglobin : 10.7 g/dL  Hematocrit : 33.1 %  Platelet Count - Automated : 271 K/uL  Mean Cell Volume : 90.2 fl  Mean Cell Hemoglobin : 29.2 pg  Mean Cell Hemoglobin Concentration : 32.3 gm/dL  Auto Neutrophil # : 7.28 K/uL  Auto Lymphocyte # : 1.41 K/uL  Auto Monocyte # : 0.34 K/uL  Auto Eosinophil # : 0.01 K/uL  Auto Basophil # : 0.02 K/uL  Auto Neutrophil % : 80.2 %  Auto Lymphocyte % : 15.5 %  Auto Monocyte % : 3.7 %  Auto Eosinophil % : 0.1 %  Auto Basophil % : 0.2 %    .		Differential:	[] Automated		[] Manual  Chemistry                        10.7   9.09  )-----------( 271      ( 28 Feb 2024 12:16 )             33.1     02-28    134<L>  |  100  |  25<H>  ----------------------------<  120<H>  4.9   |  21<L>  |  1.41<H>    Ca    9.4      28 Feb 2024 16:23  Phos  3.8     02-28  Mg     2.2     02-28    TPro  7.8  /  Alb  4.3  /  TBili  0.4  /  DBili  x   /  AST  19  /  ALT  6<L>  /  AlkPhos  64  02-28    LIVER FUNCTIONS - ( 28 Feb 2024 12:16 )  Alb: 4.3 g/dL / Pro: 7.8 g/dL / ALK PHOS: 64 U/L / ALT: 6 U/L / AST: 19 U/L / GGT: x             Urinalysis Basic - ( 28 Feb 2024 16:23 )    Color: x / Appearance: x / SG: x / pH: x  Gluc: 120 mg/dL / Ketone: x  / Bili: x / Urobili: x   Blood: x / Protein: x / Nitrite: x   Leuk Esterase: x / RBC: x / WBC x   Sq Epi: x / Non Sq Epi: x / Bacteria: x            MICROBIOLOGY/CULTURES:      RADIOLOGY RESULTS: reviewed

## 2024-02-28 NOTE — ED PROVIDER NOTE - PHYSICAL EXAMINATION
GENERAL: no acute distress, non-toxic appearing  HEENT: normal conjunctiva, oral mucosa moist  CARDIAC: regular rate and regular rhythm, bp reassuring  PULM: clear to ascultation bilaterally, no appreciable crackles, rales, rhonchi, or wheezing, sats 99% on RA, no increased work of breathing  GI: abdomen nondistended, soft, nontender  : no CVA tenderness, no suprapubic tenderness  NEURO: alert and oriented x 3, normal speech, moving all extremities without lateralization  MSK: no visible deformities, no peripheral edema, calf tenderness/redness/swelling  SKIN: no visible rashes  PSYCH: appropriate mood and affect

## 2024-02-28 NOTE — ED ADULT NURSE NOTE - NSFALLUNIVINTERV_ED_ALL_ED
Bed/Stretcher in lowest position, wheels locked, appropriate side rails in place/Call bell, personal items and telephone in reach/Instruct patient to call for assistance before getting out of bed/chair/stretcher/Non-slip footwear applied when patient is off stretcher/[place] to call system/Physically safe environment - no spills, clutter or unnecessary equipment/Purposeful proactive rounding/Room/bathroom lighting operational, light cord in reach

## 2024-02-28 NOTE — ED PROVIDER NOTE - CARE PLAN
1 Principal Discharge DX:	Syncope  Secondary Diagnosis:	Rapidly progressive dementia  Secondary Diagnosis:	PARVEEN (acute kidney injury)

## 2024-02-28 NOTE — CHART NOTE - NSCHARTNOTEFT_GEN_A_CORE
Bisi Jose  83F Hx rapidly progressive dementia p/f inability of family to care for her and 1w of decreased verbal output. CTH w/age related changes. 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  -no acute neurosurgery intervention, defer AMS/Stroke w/u to neurology  -can follow up with Dr. Oliveros/Ze LIU on outpatient basis

## 2024-02-28 NOTE — ED ADULT NURSE REASSESSMENT NOTE - NS ED NURSE REASSESS COMMENT FT1
Report taken from CUEVAS RN. Pt introduced to oncoming RN and updated on plan of care. pt is A&OX2, VSS, resting comfortably and no acute distress noted. Call bell in reach, pt educated on use. Bed locked and in lowest position. Denies any needs or complaints at this time. pending dispo

## 2024-02-29 ENCOUNTER — RESULT REVIEW (OUTPATIENT)
Age: 84
End: 2024-02-29

## 2024-02-29 DIAGNOSIS — F43.21 ADJUSTMENT DISORDER WITH DEPRESSED MOOD: ICD-10-CM

## 2024-02-29 DIAGNOSIS — F03.90 UNSPECIFIED DEMENTIA WITHOUT BEHAVIORAL DISTURBANCE: ICD-10-CM

## 2024-02-29 LAB
24R-OH-CALCIDIOL SERPL-MCNC: 24.1 NG/ML — LOW (ref 30–80)
A1C WITH ESTIMATED AVERAGE GLUCOSE RESULT: 5.9 % — HIGH (ref 4–5.6)
AMMONIA BLD-MCNC: 29 UMOL/L — SIGNIFICANT CHANGE UP (ref 11–55)
ANION GAP SERPL CALC-SCNC: 10 MMOL/L — SIGNIFICANT CHANGE UP (ref 5–17)
BUN SERPL-MCNC: 26 MG/DL — HIGH (ref 7–23)
CALCIUM SERPL-MCNC: 9.2 MG/DL — SIGNIFICANT CHANGE UP (ref 8.4–10.5)
CHLORIDE SERPL-SCNC: 102 MMOL/L — SIGNIFICANT CHANGE UP (ref 96–108)
CHOLEST SERPL-MCNC: 196 MG/DL — SIGNIFICANT CHANGE UP
CK SERPL-CCNC: 68 U/L — SIGNIFICANT CHANGE UP (ref 25–170)
CO2 SERPL-SCNC: 23 MMOL/L — SIGNIFICANT CHANGE UP (ref 22–31)
CREAT SERPL-MCNC: 1.45 MG/DL — HIGH (ref 0.5–1.3)
CULTURE RESULTS: SIGNIFICANT CHANGE UP
EGFR: 35 ML/MIN/1.73M2 — LOW
ESTIMATED AVERAGE GLUCOSE: 123 MG/DL — HIGH (ref 68–114)
FOLATE SERPL-MCNC: 6 NG/ML — SIGNIFICANT CHANGE UP
GLUCOSE BLDC GLUCOMTR-MCNC: 122 MG/DL — HIGH (ref 70–99)
GLUCOSE BLDC GLUCOMTR-MCNC: 136 MG/DL — HIGH (ref 70–99)
GLUCOSE BLDC GLUCOMTR-MCNC: 138 MG/DL — HIGH (ref 70–99)
GLUCOSE BLDC GLUCOMTR-MCNC: 80 MG/DL — SIGNIFICANT CHANGE UP (ref 70–99)
GLUCOSE BLDC GLUCOMTR-MCNC: 82 MG/DL — SIGNIFICANT CHANGE UP (ref 70–99)
GLUCOSE BLDC GLUCOMTR-MCNC: 84 MG/DL — SIGNIFICANT CHANGE UP (ref 70–99)
GLUCOSE BLDC GLUCOMTR-MCNC: 84 MG/DL — SIGNIFICANT CHANGE UP (ref 70–99)
GLUCOSE SERPL-MCNC: 136 MG/DL — HIGH (ref 70–99)
HDLC SERPL-MCNC: 65 MG/DL — SIGNIFICANT CHANGE UP
LIPID PNL WITH DIRECT LDL SERPL: 118 MG/DL — HIGH
NON HDL CHOLESTEROL: 131 MG/DL — HIGH
POTASSIUM SERPL-MCNC: 4.4 MMOL/L — SIGNIFICANT CHANGE UP (ref 3.5–5.3)
POTASSIUM SERPL-SCNC: 4.4 MMOL/L — SIGNIFICANT CHANGE UP (ref 3.5–5.3)
SODIUM SERPL-SCNC: 135 MMOL/L — SIGNIFICANT CHANGE UP (ref 135–145)
SPECIMEN SOURCE: SIGNIFICANT CHANGE UP
T3 SERPL-MCNC: 73 NG/DL — LOW (ref 80–200)
T4 AB SER-ACNC: 10.2 UG/DL — SIGNIFICANT CHANGE UP (ref 4.6–12)
THYROPEROXIDASE AB SERPL-ACNC: 372 IU/ML — HIGH
TRIGL SERPL-MCNC: 70 MG/DL — SIGNIFICANT CHANGE UP
VIT B12 SERPL-MCNC: 332 PG/ML — SIGNIFICANT CHANGE UP (ref 232–1245)

## 2024-02-29 PROCEDURE — 99222 1ST HOSP IP/OBS MODERATE 55: CPT

## 2024-02-29 PROCEDURE — 70551 MRI BRAIN STEM W/O DYE: CPT | Mod: 26

## 2024-02-29 PROCEDURE — 93306 TTE W/DOPPLER COMPLETE: CPT | Mod: 26

## 2024-02-29 PROCEDURE — 93880 EXTRACRANIAL BILAT STUDY: CPT | Mod: 26

## 2024-02-29 PROCEDURE — 76770 US EXAM ABDO BACK WALL COMP: CPT | Mod: 26

## 2024-02-29 RX ORDER — ASPIRIN/CALCIUM CARB/MAGNESIUM 324 MG
81 TABLET ORAL DAILY
Refills: 0 | Status: DISCONTINUED | OUTPATIENT
Start: 2024-02-29 | End: 2024-03-04

## 2024-02-29 RX ORDER — LANOLIN ALCOHOL/MO/W.PET/CERES
3 CREAM (GRAM) TOPICAL AT BEDTIME
Refills: 0 | Status: DISCONTINUED | OUTPATIENT
Start: 2024-02-29 | End: 2024-03-04

## 2024-02-29 RX ORDER — INFLUENZA VIRUS VACCINE 15; 15; 15; 15 UG/.5ML; UG/.5ML; UG/.5ML; UG/.5ML
0.7 SUSPENSION INTRAMUSCULAR ONCE
Refills: 0 | Status: DISCONTINUED | OUTPATIENT
Start: 2024-02-29 | End: 2024-03-04

## 2024-02-29 RX ORDER — ATORVASTATIN CALCIUM 80 MG/1
80 TABLET, FILM COATED ORAL AT BEDTIME
Refills: 0 | Status: DISCONTINUED | OUTPATIENT
Start: 2024-02-29 | End: 2024-03-04

## 2024-02-29 RX ADMIN — ATORVASTATIN CALCIUM 80 MILLIGRAM(S): 80 TABLET, FILM COATED ORAL at 21:42

## 2024-02-29 RX ADMIN — HEPARIN SODIUM 5000 UNIT(S): 5000 INJECTION INTRAVENOUS; SUBCUTANEOUS at 05:54

## 2024-02-29 RX ADMIN — Medication 75 MILLIGRAM(S): at 15:10

## 2024-02-29 RX ADMIN — Medication 81 MILLIGRAM(S): at 15:10

## 2024-02-29 RX ADMIN — Medication 3 MILLIGRAM(S): at 21:43

## 2024-02-29 RX ADMIN — Medication 50 MICROGRAM(S): at 05:54

## 2024-02-29 RX ADMIN — HEPARIN SODIUM 5000 UNIT(S): 5000 INJECTION INTRAVENOUS; SUBCUTANEOUS at 21:44

## 2024-02-29 RX ADMIN — HEPARIN SODIUM 5000 UNIT(S): 5000 INJECTION INTRAVENOUS; SUBCUTANEOUS at 17:13

## 2024-02-29 NOTE — BH CONSULTATION LIAISON ASSESSMENT NOTE - RISK ASSESSMENT
Static RF: has dementia, close friend and son passed away  Modifiable RF: lives alone in a private residence   Protective Factors: supportive HCP, residential stability, no hx of SA, attends senior center

## 2024-02-29 NOTE — PHYSICAL THERAPY INITIAL EVALUATION ADULT - ADDITIONAL COMMENTS
Patient lives alone in pvt house with family  5  steps to enter. No steps inside  Patient ambulated without AD independent. pt owns  o dme s at home.

## 2024-02-29 NOTE — BH CONSULTATION LIAISON ASSESSMENT NOTE - DIFFERENTIAL
Dressing clean and dry/Alert and oriented to person, place and time
Delirium vs. delirium superimposed on dementia

## 2024-02-29 NOTE — BH CONSULTATION LIAISON ASSESSMENT NOTE - NSBHCHARTREVIEWINVESTIGATE_PSY_A_CORE FT
< from: CT Head No Cont (02.28.24 @ 14:01) >    IMPRESSION:        1.   Brain: No acute abnormalities. There is extensive   periventricular hypoattenuation, compatible with advanced chronic   microvascular ischemic changes.      < end of copied text >

## 2024-02-29 NOTE — BH CONSULTATION LIAISON ASSESSMENT NOTE - CURRENT MEDICATION
MEDICATIONS  (STANDING):  aspirin  chewable 81 milliGRAM(s) Oral daily  atorvastatin 80 milliGRAM(s) Oral at bedtime  dextrose 5%. 1000 milliLiter(s) (50 mL/Hr) IV Continuous <Continuous>  dextrose 5%. 1000 milliLiter(s) (100 mL/Hr) IV Continuous <Continuous>  dextrose 50% Injectable 25 Gram(s) IV Push once  dextrose 50% Injectable 12.5 Gram(s) IV Push once  dextrose 50% Injectable 25 Gram(s) IV Push once  glucagon  Injectable 1 milliGRAM(s) IntraMuscular once  heparin   Injectable 5000 Unit(s) SubCutaneous every 8 hours  influenza  Vaccine (HIGH DOSE) 0.7 milliLiter(s) IntraMuscular once  insulin lispro (ADMELOG) corrective regimen sliding scale   SubCutaneous at bedtime  insulin lispro (ADMELOG) corrective regimen sliding scale   SubCutaneous three times a day before meals  levothyroxine 50 MICROGram(s) Oral daily  venlafaxine XR. 75 milliGRAM(s) Oral daily    MEDICATIONS  (PRN):  dextrose Oral Gel 15 Gram(s) Oral once PRN Blood Glucose LESS THAN 70 milliGRAM(s)/deciliter

## 2024-02-29 NOTE — BH CONSULTATION LIAISON ASSESSMENT NOTE - SUMMARY
FERCHO CARPENTER is a 83F, domiciled alone in a private residence, , retired, and has a HCP (Mya) who lives nearby and helps care for her, no past psychiatric history or hospitalizations, PMH of dementia (A&O x2 at baseline), admitted to the hospital after an episode of syncope while home alone. Psych consult was called for an episode of acute agitation, unintelligible speech, and A&O x0. Currently, the pt is A&O x2 and is calm, cooperative.      FERCHO CARPENTER is a 83F, domiciled alone in a private residence, , retired, and has a HCP (Mya) who lives nearby and helps care for her, no past psychiatric history or hospitalizations, PMH of dementia (A&O x2 at baseline), admitted to the hospital after an episode of syncope while home alone. Psych consult was called for an episode of acute agitation, unintelligible speech, and A&O x0. Currently, the pt is A&O x2 and is calm, cooperative. Patient is presenting with a worsening of her mental status on top of a preexisting cognitive decline.

## 2024-02-29 NOTE — BH CONSULTATION LIAISON ASSESSMENT NOTE - NSBHATTESTCOMMENTATTENDFT_PSY_A_CORE
This is an 83-y.o. HF patient, domiciled alone in a private residence, , retired, and has a HCP (Mya) who lives nearby and helps care for her, no past psychiatric history or hospitalizations, PMH of dementia (A&O x2 at baseline), admitted to the hospital after an episode of syncope while home alone. Psych consult was called for an episode of acute agitation, unintelligible speech, and A&O x0.    I have seen and evaluated this patient myself. Chart, labs, meds reviewed. I agree with trainee's assessment and plan. Patient presenting with clouding of sensorium superimposed on pre-existing cognitive disorder.

## 2024-02-29 NOTE — BH CONSULTATION LIAISON ASSESSMENT NOTE - DESCRIPTION
The pt is [place] and knows English, Estonian, and Cambodian. She is  and never remarried. She had one son, who passed away in 2019 from an aneurysm. 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 She attends a senior center, where she socializes, plays cards, and volunteers -- her involvement in the senior center has been much less since January. She lives alone in a private residence in [place].

## 2024-02-29 NOTE — PHYSICAL THERAPY INITIAL EVALUATION ADULT - NSPTDISCHREC_GEN_A_CORE
Subacute Rehab; Pt with limitations in self-care & home management, will benefit from Sub acute rehab prior to D/C home to increase strength, balance and endurance to improve functional mobility to level necessary for safe return home/Sub-acute Rehab If pt. to d/c home, would recommend home with PT and assist for all functional mobility. DME- [facility].Patient will require RW at home  to help complete mobility related activities for daily living.  pt will require 3:1 commode as patient confined to a single level/single room w/o a bathroom and pt maintains decrease standing tolerance./Sub-acute Rehab

## 2024-02-29 NOTE — BH CONSULTATION LIAISON ASSESSMENT NOTE - NSBHCHARTREVIEWVS_PSY_A_CORE FT
Vital Signs Last 24 Hrs  T(C): 36.4 (29 Feb 2024 13:21), Max: 37.1 (29 Feb 2024 04:00)  T(F): 97.6 (29 Feb 2024 13:21), Max: 98.8 (29 Feb 2024 04:00)  HR: 70 (29 Feb 2024 13:21) (65 - 95)  BP: 165/61 (29 Feb 2024 13:21) (118/65 - 169/70)  BP(mean): --  RR: 17 (29 Feb 2024 13:21) (17 - 18)  SpO2: 100% (29 Feb 2024 13:21) (96% - 100%)    Parameters below as of 29 Feb 2024 08:46  Patient On (Oxygen Delivery Method): room air

## 2024-02-29 NOTE — PHYSICAL THERAPY INITIAL EVALUATION ADULT - PERTINENT HX OF CURRENT PROBLEM, REHAB EVAL
83y RH woman with dementia (aa0x2 baseline), hypothyroidism, HTN, DM presented to the ED for acute on chronic cognitive changes and aphasia. Patient accompanied by HCP/caretaker who aided in history. As per HCP, she received a call from the patient yesterday stating "I passed out". Patient was found on her couch with her eyes closed, awake but more confused compared to baseline. Patient stated that she went to the kitchen to test her sugar and began feeling dizzy and "passed out" however never lost consciousness. Acute on chronic cognitive decline with superimposed dementia, with possible broca type aphasia for the past one week concerning for cerebral ischemia vs toxic/metabolic/infectious etiology. Syncope of unclear etiology.   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 Also found to have PARVEEN. CTh with advanced chronic microvascular changes   CTA H/N with 4.9x3.8mm aneurysm with wide 3mm neck . AMS, worsening dementia, FTT. x ray chest- The visualized osseous structures demonstrate no acute pathology.

## 2024-02-29 NOTE — BH CONSULTATION LIAISON ASSESSMENT NOTE - NSBHCHARTREVIEWLAB_PSY_A_CORE FT
10.7   9.09  )-----------( 271      ( 28 Feb 2024 12:16 )             33.1     02-28    134<L>  |  100  |  25<H>  ----------------------------<  120<H>  4.9   |  21<L>  |  1.41<H>    Ca    9.4      28 Feb 2024 16:23  Phos  3.8     02-28  Mg     2.2     02-28    TPro  7.8  /  Alb  4.3  /  TBili  0.4  /  DBili  x   /  AST  19  /  ALT  6<L>  /  AlkPhos  64  02-28    Urinalysis Basic - ( 28 Feb 2024 16:23 )    Color: x / Appearance: x / SG: x / pH: x  Gluc: 120 mg/dL / Ketone: x  / Bili: x / Urobili: x   Blood: x / Protein: x / Nitrite: x   Leuk Esterase: x / RBC: x / WBC x   Sq Epi: x / Non Sq Epi: x / Bacteria: x

## 2024-02-29 NOTE — PATIENT PROFILE ADULT - FALL HARM RISK - HARM RISK INTERVENTIONS
Assistance with ambulation/Assistance OOB with selected safe patient handling equipment/Communicate Risk of Fall with Harm to all staff/Monitor for mental status changes/Move patient closer to nurses' station/Reinforce activity limits and safety measures with patient and family/Reorient to person, place and time as needed/Tailored Fall Risk Interventions/Toileting schedule using arm’s reach rule for commode and bathroom/Use of alarms - bed, chair and/or voice tab/Visual Cue: Yellow wristband and red socks/Bed in lowest position, wheels locked, appropriate side rails in place/Call bell, personal items and telephone in reach/Instruct patient to call for assistance before getting out of bed or chair/Non-slip footwear when patient is out of bed/[place] to call system/Physically safe environment - no spills, clutter or unnecessary equipment/Purposeful Proactive Rounding/Room/bathroom lighting operational, light cord in reach

## 2024-02-29 NOTE — BH CONSULTATION LIAISON ASSESSMENT NOTE - HPI (INCLUDE ILLNESS QUALITY, SEVERITY, DURATION, TIMING, CONTEXT, MODIFYING FACTORS, ASSOCIATED SIGNS AND SYMPTOMS)
FERCHO CARPENTER is a 83F, domiciled alone in a private residence, , retired, and has a HCP (Mya) who lives nearby and helps care for her.     The pt has no past psychiatric history and no history of psychiatric inpatient hospitalizations. She denied history of SA. She denied active use of any recreational substances and alcohol. She quit smoking cigarettes 20 years ago. She denied any legal history. Her PMH is significant for dementia (A&O x2 at baseline), hypothyroidism, HTN, and DM. The pt was admitted to the hospital after an episode of syncope while she was at home alone.   Psych consulted for an episode of agitation, worsening dementia, and declining mental status.     The pt reported that two days ago, she went into her kitchen to take a fingerstick glucose and started feeling lightheaded and passed out on her couch. She called her HCP, Mya, who lives a 5 minute walk away and she came over right away. HCP decided to take the pt to the hospital for evaluation. The pt was admitted to the hospital from the ED and brought to her room on [facility]. Yesterday, the pt had an episode of agitation, was making unintelligible comments, and was A&O x0 (pt's baseline is A&O x2 per HCP). A decision was made to administer Seroquel 25mg PO. Since then, the pt has been calmer and A&O x2 -- back to her baseline from prior to admission. The pt stated that her mood is "not great." She shared that she would much rather be home watching TV, which she does on a daily basis. She denied feeling depressed, but shared that she still gets sad when she thinks about her son's (only child) passing in 2019. She denied anxiety or feeling nervous about anything. She denied experiencing AVH.     Collateral obtained from HCP, Mya: The pt has been cognitively declining since September of last year, but noticed a more rapid decline in January after a close friend of the pt passed away. Since the passing of her friend, the pt has been more socially disengaged--has not been going to the [employer] as much (pt reported that she "has no interest" in playing cards, attending the center's social events). 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 The pt has had very little to no appetite. The pt has not been sleeping as much lately, sometimes getting little to no sleep at night. Over the past month, the pt's weight has dropped from 112 to 92 lbs. The pt was started on Venlafaxine by her PCP this Monday for anxiety--this was started after the pt recently reported of feeling shortness of breath on numerous occasions; HCP would come over to pt's house after the pt would call complaining of chest tightness/SOB, HCP says her vitals (e.g. SpO2) were normal each time.

## 2024-02-29 NOTE — BH CONSULTATION LIAISON ASSESSMENT NOTE - ADDITIONAL DETAILS / COMMENTS
Oriented to person and place, not to time. Was able to identify items (wallet, tie, cellphone). Could not spell "World" forwards/backwards.

## 2024-02-29 NOTE — PROGRESS NOTE ADULT - ASSESSMENT
83 yr  woman with a PMHx significant for dementia (aa0x2 baseline), hypothyroidism, HTN, DM presented to the ED for acute on chronic cognitive changes and aphasia. Patient accompanied by HCP/caretaker who aided in history. As per HCP, she received a call from the patient yesterday stating "I passed out". Patient was found on her couch with her eyes closed, awake but more confused compared to baseline. Patient stated that she went to the kitchen to test her sugar and began feeling dizzy and "passed out" however never lost consciousness. No shaking noted during the episode, denied HA, numbness, acute weakness, vision changes. Patient complained of transient left eye pain and malaise.  HCP noted transient left sided facial droop.     1 AMS, worsening dementia, FTT   - likely worsening dementia  - neuro fu appreciated  - psych consult in am  - PT and rehab planing       2 DM  - monitor FS  - ISS    3 PARVEEN   - monitor cr  - renal fu     Heparin sc  for DVT prophylaxis

## 2024-02-29 NOTE — BH CONSULTATION LIAISON ASSESSMENT NOTE - NSBHCONSULTFOLLOWAFTERCARE_PSY_A_CORE FT
Patient can be referred to an outpatient provider, e.g. [facility] [phone]

## 2024-02-29 NOTE — PROGRESS NOTE ADULT - ASSESSMENT
83 yr woman with a PMHx significant for dementia (aa0x2 baseline), hypothyroidism, HTN, DM presented to the ED for acute on chronic cognitive changes and aphasia. Admitted for FTT. I got involved for renal failure    A/P:  PARVEEN:  Etiology?  Possible pre-renal  Rule out urinary retention-admitted with high K - K better today  s/p RL 1L  Scr improving  Monitor renal function closely  Get Urine Na, Urine Cr     Renal US impression:   no hydronephrosis   increased cortical echogenicity   left renal cysts     83 yr woman with a PMHx significant for dementia (aa0x2 baseline), hypothyroidism, HTN, DM presented to the ED for acute on chronic cognitive changes and aphasia. Admitted for FTT. I got involved for renal failure    A/P:  PARVEEN:  Etiology?  Possible pre-renal  Renal US-no hydro  s/p RL 1L  Scr improving  Monitor renal function closely  Get Urine Na, Urine Cr

## 2024-03-01 ENCOUNTER — TRANSCRIPTION ENCOUNTER (OUTPATIENT)
Age: 84
End: 2024-03-01

## 2024-03-01 LAB
ANION GAP SERPL CALC-SCNC: 8 MMOL/L — SIGNIFICANT CHANGE UP (ref 5–17)
BUN SERPL-MCNC: 24 MG/DL — HIGH (ref 7–23)
CALCIUM SERPL-MCNC: 9.2 MG/DL — SIGNIFICANT CHANGE UP (ref 8.4–10.5)
CHLORIDE SERPL-SCNC: 102 MMOL/L — SIGNIFICANT CHANGE UP (ref 96–108)
CO2 SERPL-SCNC: 25 MMOL/L — SIGNIFICANT CHANGE UP (ref 22–31)
CREAT SERPL-MCNC: 1.4 MG/DL — HIGH (ref 0.5–1.3)
EGFR: 37 ML/MIN/1.73M2 — LOW
GLUCOSE BLDC GLUCOMTR-MCNC: 118 MG/DL — HIGH (ref 70–99)
GLUCOSE BLDC GLUCOMTR-MCNC: 133 MG/DL — HIGH (ref 70–99)
GLUCOSE BLDC GLUCOMTR-MCNC: 79 MG/DL — SIGNIFICANT CHANGE UP (ref 70–99)
GLUCOSE BLDC GLUCOMTR-MCNC: 97 MG/DL — SIGNIFICANT CHANGE UP (ref 70–99)
GLUCOSE SERPL-MCNC: 87 MG/DL — SIGNIFICANT CHANGE UP (ref 70–99)
LACTATE SERPL-SCNC: <0.5 MMOL/L — SIGNIFICANT CHANGE UP (ref 0.5–2)
MRSA PCR RESULT.: SIGNIFICANT CHANGE UP
POTASSIUM SERPL-MCNC: 4.4 MMOL/L — SIGNIFICANT CHANGE UP (ref 3.5–5.3)
POTASSIUM SERPL-SCNC: 4.4 MMOL/L — SIGNIFICANT CHANGE UP (ref 3.5–5.3)
S AUREUS DNA NOSE QL NAA+PROBE: SIGNIFICANT CHANGE UP
SODIUM SERPL-SCNC: 135 MMOL/L — SIGNIFICANT CHANGE UP (ref 135–145)
T PALLIDUM AB TITR SER: NEGATIVE — SIGNIFICANT CHANGE UP

## 2024-03-01 RX ORDER — ASPIRIN/CALCIUM CARB/MAGNESIUM 324 MG
1 TABLET ORAL
Qty: 0 | Refills: 0 | DISCHARGE
Start: 2024-03-01

## 2024-03-01 RX ORDER — SODIUM CHLORIDE 9 MG/ML
250 INJECTION INTRAMUSCULAR; INTRAVENOUS; SUBCUTANEOUS ONCE
Refills: 0 | Status: COMPLETED | OUTPATIENT
Start: 2024-03-01 | End: 2024-03-01

## 2024-03-01 RX ORDER — MULTIVIT-MIN/FERROUS GLUCONATE 9 MG/15 ML
1 LIQUID (ML) ORAL DAILY
Refills: 0 | Status: DISCONTINUED | OUTPATIENT
Start: 2024-03-01 | End: 2024-03-04

## 2024-03-01 RX ORDER — ATORVASTATIN CALCIUM 80 MG/1
1 TABLET, FILM COATED ORAL
Qty: 30 | Refills: 0
Start: 2024-03-01 | End: 2024-03-30

## 2024-03-01 RX ORDER — LANOLIN ALCOHOL/MO/W.PET/CERES
1 CREAM (GRAM) TOPICAL
Qty: 0 | Refills: 0 | DISCHARGE
Start: 2024-03-01

## 2024-03-01 RX ORDER — MIDODRINE HYDROCHLORIDE 2.5 MG/1
5 TABLET ORAL
Refills: 0 | Status: DISCONTINUED | OUTPATIENT
Start: 2024-03-01 | End: 2024-03-02

## 2024-03-01 RX ORDER — CHLORHEXIDINE GLUCONATE 213 G/1000ML
1 SOLUTION TOPICAL DAILY
Refills: 0 | Status: DISCONTINUED | OUTPATIENT
Start: 2024-03-01 | End: 2024-03-04

## 2024-03-01 RX ADMIN — MIDODRINE HYDROCHLORIDE 5 MILLIGRAM(S): 2.5 TABLET ORAL at 21:06

## 2024-03-01 RX ADMIN — HEPARIN SODIUM 5000 UNIT(S): 5000 INJECTION INTRAVENOUS; SUBCUTANEOUS at 14:55

## 2024-03-01 RX ADMIN — CHLORHEXIDINE GLUCONATE 1 APPLICATION(S): 213 SOLUTION TOPICAL at 11:37

## 2024-03-01 RX ADMIN — ATORVASTATIN CALCIUM 80 MILLIGRAM(S): 80 TABLET, FILM COATED ORAL at 21:07

## 2024-03-01 RX ADMIN — Medication 75 MILLIGRAM(S): at 11:36

## 2024-03-01 RX ADMIN — Medication 81 MILLIGRAM(S): at 11:36

## 2024-03-01 RX ADMIN — SODIUM CHLORIDE 250 MILLILITER(S): 9 INJECTION INTRAMUSCULAR; INTRAVENOUS; SUBCUTANEOUS at 17:12

## 2024-03-01 RX ADMIN — HEPARIN SODIUM 5000 UNIT(S): 5000 INJECTION INTRAVENOUS; SUBCUTANEOUS at 21:06

## 2024-03-01 RX ADMIN — HEPARIN SODIUM 5000 UNIT(S): 5000 INJECTION INTRAVENOUS; SUBCUTANEOUS at 06:01

## 2024-03-01 RX ADMIN — Medication 3 MILLIGRAM(S): at 21:06

## 2024-03-01 RX ADMIN — Medication 50 MICROGRAM(S): at 06:01

## 2024-03-01 NOTE — PROGRESS NOTE ADULT - ASSESSMENT
83 yr  woman with a PMHx significant for dementia (aa0x2 baseline), hypothyroidism, HTN, DM presented to the ED for acute on chronic cognitive changes and aphasia. Patient accompanied by HCP/caretaker who aided in history. As per HCP, she received a call from the patient yesterday stating "I passed out". Patient was found on her couch with her eyes closed, awake but more confused compared to baseline. Patient stated that she went to the kitchen to test her sugar and began feeling dizzy and "passed out" however never lost consciousness. No shaking noted during the episode, denied HA, numbness, acute weakness, vision changes. Patient complained of transient left eye pain and malaise.  HCP noted transient left sided facial droop.     1 AMS, worsening dementia, FTT   - likely worsening dementia  - neuro fu appreciated  - psych consult   - PT and rehab planing   - cards fu pending     2 DM  - monitor FS  - ISS    3 PARVEEN   - monitor cr  - renal fu     Heparin sc  for DVT prophylaxis

## 2024-03-01 NOTE — DISCHARGE NOTE PROVIDER - PROVIDER TOKENS
PROVIDER:[TOKEN:[[id number]]],PROVIDER:[TOKEN:[[id number]],FOLLOWUP:[1 week]],PROVIDER:[TOKEN:[[id number]],FOLLOWUP:[1 week]],PROVIDER:[TOKEN:[[id number]]],FREE:[LAST:[[facility]],PHONE:[phone]],FAX:[(   )    -]],PROVIDER:[TOKEN:[[id number]]]

## 2024-03-01 NOTE — DIETITIAN INITIAL EVALUATION ADULT - PHYSCIAL ASSESSMENT
Visual Nutrition Focused Physical Exam conducted at this time as unable to obtain pt's consent secondary to AMS/dementia. RD will continue to follow up as able.

## 2024-03-01 NOTE — DIETITIAN INITIAL EVALUATION ADULT - ORAL INTAKE PTA/DIET HISTORY
Unable to obtain pt's subjective dietary hx at this time; RD will continue to obtain as appropriate. Per pt profile/team, pt's HCP endorsed pt with decreased, poor appetite/PO intake PTA.  Per chart, no known food allergies/intolerances documented at this time. No hx of chewing/swallowing difficulties reported.

## 2024-03-01 NOTE — DISCHARGE NOTE PROVIDER - CARE PROVIDER_API CALL
Alicia Lay  Neurology  [address], [address]  [place] [address]  Phone: [phone]  Fax: [phone]  Follow Up Time:     Alo Zuñiga  Neurology  [address], [address]  [place] [address]  Phone: [phone]  Fax: [phone]  Follow Up Time: 1 week    Holland Alex  Internal Medicine  [address]  [place] [address]  Phone: [phone]  Fax: [phone]  Follow Up Time: 1 week    Rudolph Skaggs  Nephrology  [address]  [place] [address]  Phone: [phone]  Fax: [phone]  Follow Up Time:     [facility],   Phone: [phone]  Fax: (   )    -  Follow Up Time:     Nadeem Leo  Cardiovascular Disease  [address], [address]  [place] [address]  Phone: [phone]  Fax: [phone]  Follow Up Time:

## 2024-03-01 NOTE — DIETITIAN INITIAL EVALUATION ADULT - OTHER CALCULATIONS
Used current dosing wt of 43.1 kg (2/28) for caloric/protein needs in consideration of advanced age.   Defer fluid needs to team.

## 2024-03-01 NOTE — DIETITIAN INITIAL EVALUATION ADULT - OTHER INFO
- P/w FTT    Endo:  - T2DM. Ordered for ADMELOG SSI. RD will continue to monitor blood glucose levels prn.   - Ordered for PO synthroid.     Renal:  - PARVEEN possibly pre-renal. RD will continue to monitor electrolytes prn.    Wt Hx:  - Per chart, dosing wt of 43.1 kg (2/28).   - Wt hx in kg ([facility]) as follows: 61.2 (2/02/20); suspected wt loss of 29.6% x </= 4 years (not clinically significant, based on wts from 2/02/20, 2/28/24). RD will continue to monitor weight trends as available/able.   - IBW: 52.3 kg ([facility] 63 [facility]  - P/w FTT    Endo:  - T2DM. HgbA1C of 5.9% (2/29) - reflects good glycemic control in consideration of age. POCTs x 24hrs: 82 - 138 mg/dL WNL. Ordered for ADMELOG SSI. RD will continue to monitor blood glucose levels prn.   - Ordered for PO synthroid.     Renal:  - PARVEEN possibly pre-renal. RD will continue to monitor electrolytes prn.    Wt Hx:  - Unable to obtain pt's UBW at this time; per pt profile/team, pt's HCP endorses pt with significant, recent wt loss (timeframe unknown at this time). Per chart, dosing wt of 43.1 kg (2/28).   - Wt hx in kg ([facility]) as follows: 61.2 (2/02/20); suspected wt loss of 29.6% x </= 4 years (not clinically significant, based on wts from 2/02/20, 2/28/24). RD will continue to monitor weight trends as available/able.   - IBW: 52.3 kg (based on ht of 63 in - per [facility])

## 2024-03-01 NOTE — DIETITIAN INITIAL EVALUATION ADULT - ORAL NUTRITION SUPPLEMENTS
RD to add Mighty Shakes (per serving provides 6 g PRO, 220 kcal) 2x/day and Magic Cup (per serving provides 9 g PRO, 290 kcal) 1x/day to optimize protein/caloric intake.

## 2024-03-01 NOTE — DISCHARGE NOTE PROVIDER - NSDCMRMEDTOKEN_GEN_ALL_CORE_FT
aspirin 81 mg oral tablet, chewable: 1 tab(s) orally once a day  atorvastatin 80 mg oral tablet: 1 tab(s) orally once a day (at bedtime)  pioglitazone 30 mg oral tablet: 1 tab(s) orally once a day  Synthroid 50 mcg (0.05 mg) oral tablet: 1 tab(s) orally once a day  venlafaxine 37.5 mg oral capsule, extended release: 2 cap(s) orally once a day   aspirin 81 mg oral tablet, chewable: 1 tab(s) orally once a day  atorvastatin 80 mg oral tablet: 1 tab(s) orally once a day (at bedtime)  melatonin 3 mg oral tablet: 1 tab(s) orally once a day (at bedtime)  midodrine 5 mg oral tablet: 1 tab(s) orally 3 times a day  Multiple Vitamins with Minerals oral tablet: 1 tab(s) orally once a day  pioglitazone 30 mg oral tablet: 1 tab(s) orally once a day  Synthroid 50 mcg (0.05 mg) oral tablet: 1 tab(s) orally once a day  venlafaxine 37.5 mg oral capsule, extended release: 2 cap(s) orally once a day   aspirin 81 mg oral tablet, chewable: 1 tab(s) orally once a day  atorvastatin 80 mg oral tablet: 1 tab(s) orally once a day (at bedtime)  melatonin 3 mg oral tablet: 1 tab(s) orally once a day (at bedtime)  midodrine 5 mg oral tablet: 1 tab(s) orally 3 times a day  Multiple Vitamins with Minerals oral tablet: 1 tab(s) orally once a day  physical therapy: ICD 10: F03.90, R55  pioglitazone 30 mg oral tablet: 1 tab(s) orally once a day  Synthroid 50 mcg (0.05 mg) oral tablet: 1 tab(s) orally once a day  venlafaxine 37.5 mg oral capsule, extended release: 2 cap(s) orally once a day

## 2024-03-01 NOTE — DISCHARGE NOTE PROVIDER - CARE PROVIDERS DIRECT ADDRESSES
[email or web address],DirectAddress_Unknown,DirectAddress_Unknown

## 2024-03-01 NOTE — DIETITIAN INITIAL EVALUATION ADULT - PHYSICAL ASSESSMENT CLAVICLES
Mom holding baby at this time. NAD. Baby on C/R and O2 sat monitor with alarms set per protocol. SpO2 probe on L foot at this time. moderate

## 2024-03-01 NOTE — PROGRESS NOTE ADULT - ASSESSMENT
83 yr woman with a PMHx significant for dementia (aa0x2 baseline), hypothyroidism, HTN, DM presented to the ED for acute on chronic cognitive changes and aphasia. Admitted for FTT. I got involved for renal failure    A/P:  PARVEEN:  Etiology?  Possible pre-renal  Renal US-no hydro  s/p RL 1L  Scr improving  Monitor renal function closely  Get Urine Na, Urine Cr

## 2024-03-01 NOTE — CHART NOTE - NSCHARTNOTEFT_GEN_A_CORE
MEDICINE NP NOTE  Spectra [id number]    Patient requires a rolling walker for d/c to home due to diagnosis of syncope.  Patient requires a bedside commode for d/c home due to patient is at risk to fall and cannot walk safely to the bathroom. MEDICINE NP NOTE  Spectra [phone]    Patient will require a rolling walker at home due to their dx of syncope to help complete the MRADL's  Patient requires a bedside commode for d/c home due to patient is at risk to fall and cannot walk safely to the bathroom. MEDICINE NP NOTE  Spectra [phone]    Patient will require a rolling walker at home due to their dx of syncope to help complete the MRADL's  Patient requires a bedside commode because the beneficiary is confined to a single room without a toilet in that room.

## 2024-03-01 NOTE — DISCHARGE NOTE PROVIDER - HOSPITAL COURSE
HPI:  83 yr  woman with a PMHx significant for dementia (aa0x2 baseline), hypothyroidism, HTN, DM presented to the ED for acute on chronic cognitive changes and aphasia. Patient accompanied by HCP/caretaker who aided in history. As per HCP, she received a call from the patient yesterday stating "I passed out". Patient was found on her couch with her eyes closed, awake but more confused compared to baseline. Patient stated that she went to the kitchen to test her sugar and began feeling dizzy and "passed out" however never lost consciousness. No shaking noted during the episode, denied HA, numbness, acute weakness, vision changes. Patient complained of transient left eye pain and malaise.  HCP noted transient left sided facial droop.  (28 Feb 2024 17:52)    Hospital Course:  Patient was admitted with episode of transient left sided facial droop, AMS and possible syncope.     1 AMS, worsening dementia, FTT   - likely worsening dementia  - neuro fu appreciated  - psych consult   - PT and rehab planing   - cards fu pending     2 DM  - monitor FS  - ISS    3 PARVEEN   - monitor cr  - renal fu     Heparin sc  for DVT prophylaxis       Important Medication Changes and Reason:    Active or Pending Issues Requiring Follow-up:    Advanced Directives:   [ ] Full code  [ ] DNR  [ ] Hospice    Discharge Diagnoses:         HPI:  83 yr  woman with a PMHx significant for dementia (aa0x2 baseline), hypothyroidism, HTN, DM presented to the ED for acute on chronic cognitive changes and aphasia. Patient accompanied by HCP/caretaker who aided in history. As per HCP, she received a call from the patient yesterday stating "I passed out". Patient was found on her couch with her eyes closed, awake but more confused compared to baseline. Patient stated that she went to the kitchen to test her sugar and began feeling dizzy and "passed out" however never lost consciousness. No shaking noted during the episode, denied HA, numbness, acute weakness, vision changes. Patient complained of transient left eye pain and malaise.  HCP noted transient left sided facial droop.  (28 Feb 2024 17:52)    Hospital Course:  Patient was admitted with acute on chronic cognitive changes, aphasia and fall.           Important Medication Changes and Reason:    Active or Pending Issues Requiring Follow-up:    Advanced Directives:   [ ] Full code  [ ] DNR  [ ] Hospice    Discharge Diagnoses:         Hospital Course:    83 yr  woman with a PMHx significant for dementia (aa0x2 baseline), hypothyroidism, HTN, DM presented to the ED for acute on chronic cognitive changes and aphasia. Patient accompanied by HCP/caretaker who aided in history. As per HCP, she received a call from the patient yesterday stating "I passed out". Patient was found on her couch with her eyes closed, awake but more confused compared to baseline. Patient stated that she went to the kitchen to test her sugar and began feeling dizzy and "passed out" however never lost consciousness. No shaking noted during the episode, denied HA, numbness, acute weakness, vision changes. Patient complained of transient left eye pain and malaise.  HCP noted transient left sided facial droop.     Admitted with AMS, worsening dementia, FTT that was evaluated by neurology. 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 EF 67%. CTH no acute findings. chronic microvascular changes. CTA H/N with R ICA 4.9x3.8mm aneurysms with wide neck 3mm CD neg. Neurosurgery consulted with recs for no acute neurosurgery intervention, defer AMS/Stroke w/u to neurology. Can follow up with Dr. Oliveros/Ze [facility] on outpatient basis. MRI brain with no acute infarct noted. chronic small vessel changes. Dementia near baseline as per neuro. Pt with c/f syncope in the setting of orthostatic hypotension. Started midodrine with improvement and asymptomatic. S/p IVF. PT recs for ROSE[facility]NA, pt and family refused. Pt with PARVEEN that is stable. Renal US - no hydro. As per cardiology, echo reviewed couple of bubbles cross less than 6 beats which is likely consistent with intra-cardiac shunt. Given no evidence of acute CVA, no indication to close PFO or extra cardiac shunt at this time.    Medically cleared for discharge home with home care as per Dr. Mendoza.    Important Medication Changes and Reason: added Midodrine, statin, and ASA    Active or Pending Issues Requiring Follow-up: PCP, neuro, nephro, neurosurgery    Advanced Directives:   [X] Full code  [ ] DNR  [ ] Hospice    Discharge Diagnoses:  AMS  Dementia  R ICA 4.9x3.8mm aneurysms with wide neck 3mm CD neg  Orthostatic hypotension     Hospital Course:    83 yr  woman with a PMHx significant for dementia (aa0x2 baseline), hypothyroidism, HTN, DM presented to the ED for acute on chronic cognitive changes and aphasia. Patient accompanied by HCP/caretaker who aided in history. As per HCP, she received a call from the patient yesterday stating "I passed out". Patient was found on her couch with her eyes closed, awake but more confused compared to baseline. Patient stated that she went to the kitchen to test her sugar and began feeling dizzy and "passed out" however never lost consciousness. No shaking noted during the episode, denied HA, numbness, acute weakness, vision changes. Patient complained of transient left eye pain and malaise.  HCP noted transient left sided facial droop.     Admitted with AMS, worsening dementia, FTT that was evaluated by neurology. 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 EF 67%. CTH no acute findings. chronic microvascular changes. CTA H/N with R ICA 4.9x3.8mm aneurysms with wide neck 3mm CD neg. Neurosurgery consulted with recs for no acute neurosurgery intervention, defer AMS/Stroke w/u to neurology. Can follow up with Dr. Oliveros/Ze [facility] on outpatient basis. MRI brain with no acute infarct noted. chronic small vessel changes. Dementia near baseline as per neuro. Pt with c/f syncope in the setting of orthostatic hypotension. Started midodrine with improvement and asymptomatic. S/p IVF. PT recs for RO[facility], pt and family refused. Pt with PARVEEN that is stable. Renal US - no hydro. As per cardiology, echo reviewed couple of bubbles cross less than 6 beats which is likely consistent with intra-cardiac shunt. Given no evidence of acute CVA, no indication to close PFO or extra cardiac shunt at this time.    Medically cleared for discharge home with home care as per Dr. Mendoza. Pt's friend Mya is declining ROSEANNA and accepts responsibility to take pt home.    Important Medication Changes and Reason: added Midodrine, statin, and ASA    Active or Pending Issues Requiring Follow-up: PCP, neuro, nephro, neurosurgery    Advanced Directives:   [X] Full code  [ ] DNR  [ ] Hospice    Discharge Diagnoses:  AMS  Dementia  R ICA 4.9x3.8mm aneurysms with wide neck 3mm CD neg  Orthostatic hypotension

## 2024-03-01 NOTE — DIETITIAN INITIAL EVALUATION ADULT - SIGNS/SYMPTOMS
<75% EER x >/= 1 mos, wt loss of x mos, BMI = 16.8  <75% EER x >/= 1 mos, mild muscle/fat wasting, BMI = 16.8

## 2024-03-01 NOTE — DISCHARGE NOTE NURSING/CASE MANAGEMENT/SOCIAL WORK - PATIENT PORTAL LINK FT
You can access the FollowMyHealth Patient Portal offered by [facility] by registering at the following website: [email or web address]. By joining "[employer]"’s FollowMyHealth portal, you will also be able to view your health information using other applications (apps) compatible with our system.

## 2024-03-01 NOTE — DIETITIAN INITIAL EVALUATION ADULT - ENERGY INTAKE
- Per team, endorses pt with poor appetite/PO intake, consuming <25% of meals in-house. RD to add Mighty Shakes (per serving provides 6 g PRO, 220 kcal) 2x/day and Magic Cup (per serving provides 9 g PRO, 290 kcal) 1x/day to optimize protein/caloric intake.  Poor (<50%) - Per team, endorses pt with ongoing poor appetite/PO intake, consuming <25% of meals in-house. RD to add Mighty Shakes (per serving provides 6 g PRO, 220 kcal) 2x/day and Magic Cup (per serving provides 9 g PRO, 290 kcal) 1x/day to optimize protein/caloric intake.

## 2024-03-01 NOTE — DIETITIAN INITIAL EVALUATION ADULT - PERTINENT MEDS FT
MEDICATIONS  (STANDING):  aspirin  chewable 81 milliGRAM(s) Oral daily  atorvastatin 80 milliGRAM(s) Oral at bedtime  chlorhexidine 2% Cloths 1 Application(s) Topical daily  dextrose 5%. 1000 milliLiter(s) (50 mL/Hr) IV Continuous <Continuous>  dextrose 5%. 1000 milliLiter(s) (100 mL/Hr) IV Continuous <Continuous>  dextrose 50% Injectable 25 Gram(s) IV Push once  dextrose 50% Injectable 12.5 Gram(s) IV Push once  dextrose 50% Injectable 25 Gram(s) IV Push once  glucagon  Injectable 1 milliGRAM(s) IntraMuscular once  heparin   Injectable 5000 Unit(s) SubCutaneous every 8 hours  influenza  Vaccine (HIGH DOSE) 0.7 milliLiter(s) IntraMuscular once  insulin lispro (ADMELOG) corrective regimen sliding scale   SubCutaneous at bedtime  insulin lispro (ADMELOG) corrective regimen sliding scale   SubCutaneous three times a day before meals  levothyroxine 50 MICROGram(s) Oral daily  melatonin 3 milliGRAM(s) Oral at bedtime  venlafaxine XR. 75 milliGRAM(s) Oral daily    MEDICATIONS  (PRN):  dextrose Oral Gel 15 Gram(s) Oral once PRN Blood Glucose LESS THAN 70 milliGRAM(s)/deciliter

## 2024-03-01 NOTE — DISCHARGE NOTE PROVIDER - NSDCFUADDAPPT_GEN_ALL_CORE_FT
APPTS ARE READY TO BE MADE: [X] YES    Best Family or Patient Contact (if needed):    Additional Information about above appointments (if needed):    1:   2:   3:     Other comments or requests:    APPTS ARE READY TO BE MADE: [X] YES    Best Family or Patient Contact (if needed):    Additional Information about above appointments (if needed):    1:   2:   3:     Other comments or requests:       Appointment was scheduled but is not visible on [facility]. Dr. Zuñiga 3/14 at 1:15pm [address] [facility]  Provided patient with provider referral information, however patient prefers to schedule the appointments on their own.

## 2024-03-01 NOTE — DIETITIAN INITIAL EVALUATION ADULT - PERTINENT LABORATORY DATA
03-01    135  |  102  |  24<H>  ----------------------------<  87  4.4   |  25  |  1.40<H>    Ca    9.2      01 Mar 2024 06:18    POCT Blood Glucose.: 133 mg/dL (03-01-24 @ 11:49)  A1C with Estimated Average Glucose Result: 5.9 % (02-29-24 @ 07:49)

## 2024-03-01 NOTE — DIETITIAN INITIAL EVALUATION ADULT - REASON INDICATOR FOR ASSESSMENT
RD Consult Indicated for: MST Score 2 or >  Source: Team, Electronic Medical Record; Unable to interview pt secondary to AMS, dementia.   Chart reviewed, events noted.  RD Consult Indicated for: MST Score 2 or >  Source: Team, Electronic Medical Record; Unable to interview pt secondary to AMS, dementia. Attempted to contact HCP (Mya George #[phone]), but was unable to reach.   Chart reviewed, events noted.

## 2024-03-01 NOTE — DIETITIAN INITIAL EVALUATION ADULT - NSFNSGIIOFT_GEN_A_CORE
I&O's Detail    29 Feb 2024 07:01  -  01 Mar 2024 07:00  --------------------------------------------------------  IN:  Total IN: 0 mL    OUT:    Voided (mL): 1 mL  Total OUT: 1 mL    Total NET: -1 mL      01 Mar 2024 07:01  -  01 Mar 2024 14:18  --------------------------------------------------------  IN:    Oral Fluid: 520 mL  Total IN: 520 mL    OUT:  Total OUT: 0 mL    Total NET: 520 mL

## 2024-03-01 NOTE — PROGRESS NOTE ADULT - ASSESSMENT
83 yr  woman with a PMHx significant for dementia (aa0x2 baseline), hypothyroidism, HTN, DM presented to the ED for acute on chronic cognitive changes and aphasia. Patient accompanied by HCP/caretaker who aided in history. As per HCP, she received a call from the patient yesterday stating "I passed out". Patient was found on her couch with her eyes closed, awake but more confused compared to baseline. Patient stated that she went to the kitchen to test her sugar and began feeling dizzy and "passed out" however never lost consciousness. No shaking noted during the episode, denied HA, numbness, acute weakness, vision changes. Patient complained of transient left eye pain and malaise.  HCP noted transient left sided facial droop.     1 AMS, worsening dementia, FTT   - likely worsening dementia  - neuro fu appreciated  - psych consult   - cards fu   - start midodrine for orthostatic hypotension   - will need rehab placement     2 DM  - monitor FS  - ISS    3 PARVEEN   - monitor cr  - renal fu     Heparin sc  for DVT prophylaxis

## 2024-03-01 NOTE — DISCHARGE NOTE PROVIDER - NSDCCPCAREPLAN_GEN_ALL_CORE_FT
PRINCIPAL DISCHARGE DIAGNOSIS  Diagnosis: Syncope  Assessment and Plan of Treatment: Likely in the setting of orthostatic hypotension.  You were given IV fluids and started on Midodrine and improvement noted. Please follow up with neurology and cardiology, as well as your primary care doctor.  You were found to have  CTA H/N with R ICA 4.9x3.8mm aneurysms with wide neck 3mm CD neg. Neurosurgery consulted with recommendation for no acute neurosurgery intervention. Can follow up with Dr. Oliveros/Ze on outpatient basis.  HOME CARE INSTRUCTIONS  Have someone stay with you until you feel stable.  Do not drive, operate machinery, or play sports until your caregiver says it is okay.  Keep all follow-up appointments as directed by your caregiver.   Lie down right away if you start feeling like you might faint. Breathe deeply and steadily. Wait until all the symptoms have passed.Drink enough fluids to keep your urine clear or pale yellow.  If you are taking blood pressure or heart medicine, get up slowly, taking several minutes to sit and then stand. This can reduce dizziness.  SEEK IMMEDIATE MEDICAL CARE IF:  You have a severe headache.  You have unusual pain in the chest, abdomen, or back.  You are bleeding from the mouth or rectum, or you have black or tarry stool.  You have an irregular or very fast heartbeat.  You have pain with breathing.  You have repeated fainting or seizure-like jerking during an episode.  You faint when sitting or lying down.  You have confusion.  You have difficulty walking.  You have severe weakness.  You have vision problems.  If you fainted, call your local emergency services.      SECONDARY DISCHARGE DIAGNOSES  Diagnosis: Rapidly progressive dementia  Assessment and Plan of Treatment: Please follow up with neurology upon discharge within 1 week.  Can also follow up with Behavioral Health for medication adjustments.    Diagnosis: PARVEEN (acute kidney injury)  Assessment and Plan of Treatment: Please follow up with nephrology on discharge.  Avoid taking (NSAIDs) - (ex: Ibuprofen, Advil, Celebrex, Naprosyn)  Avoid taking any nephrotoxic agents (can harm kidneys) - Intravenous contrast for diagnostic testing, combination cold medications.  Have all medications adjusted for your renal function by your Health Care Provider.  Blood pressure control is important.  Take all medication as prescribed.

## 2024-03-01 NOTE — DIETITIAN INITIAL EVALUATION ADULT - ADD RECOMMEND
[X] Continue with Regular Diet; Defer texture/consistency to Speech Language Pathologist prn.   [X] Consider adding multivitamin once daily for micronutrient coverage, pending no medical contraindications.   [X] Malnutrition/BMI <19 Sticker placed in chart  [X] RD will continue to monitor PO intake, GI tolerance, weight trends, skin integrity, BMs, labs/electrolytes prn.   RD remains available upon request.

## 2024-03-01 NOTE — CONSULT NOTE ADULT - ASSESSMENT
83 yr woman with a PMHx significant for dementia (aa0x2 baseline), hypothyroidism, HTN, DM presented to the ED for acute on chronic cognitive changes and aphasia. Admitted for FTT. I got involved for renal failure    A/P:  PARVEEN:  Etiology?  Possible pre-renal  Rule out urinary retention-admitted with high K  s/p RL 1L  Scr improving  Monitor renal function closely  Get Urine Na, Cr, Renal US
Syncope  unclear if pt truly had syncope   she states she "closed her eyes" and went black   possible vagal reaction     Cardiac shunt   echo reviewed couple of bubbles cross less than 6 beats which is likely consistent with intra-cardiac shunt   it appears to be very small shunt  as per neurology and MRI of brain no evidence of acute CVA  hence no indication to close PFO or extra cardiac shunt at this time 
 83y (1940) RH woman with a PMHx significant for dementia (aa0x2 baseline), hypothyroidism, HTN, DM presented to the ED for acute on chronic cognitive changes and aphasia. Patient accompanied by HCP/caretaker who aided in history. As per HCP, she received a call from the patient yesterday stating "I passed out". Patient was found on her couch with her eyes closed, awake but more confused compared to baseline. Patient stated that she went to the kitchen to test her sugar and began feeling dizzy and "passed out" however never lost consciousness. No shaking noted during the episode, denied HA, numbness, acute weakness, vision changes. Patient complained of transient left eye pain and malaise.  HCP noted transient left sided facial droop.     As per HCP, on 1/24 patient lost her best friend and since that date had significant cognitive decline, difficulty taking care of herself, loss of interest in activities.   HCP stated 7 days ago began noticing speech stuttering and worsening forgetfulness however no focal deficits noted.     As per HCP, patient was dx with dementia by PCP in the past as she was having forgetfulness. Had not had MRI brain in the past. Patient is not on aspirin. no toxic habits.       Impression: Acute on chronic cognitive decline with superimposed dementia, with possible broca type aphasia for the past one week concerning for cerebral ischemia vs toxic/metabolic/infectious etiology. Syncope of unclear etiology.   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 Also found to have PARVEEN.       Recommendations:     Imaging/Labs  [] MRI brain w/o contrast   [x] CTA H/N to look at the cerebral vasculature   [] Carotid US  [] TTE with bubble study and telemetry   [] Check UA, Utox, TSH, T3/T4, RPR, antithyroglobulin Ab, TPO Ab, vitamin B1, B6, B12, folate, lactate, Vitamin D 25 OH, creatinine kinase, ammonia, HbA1C and Lipid Panel.  [] Orthostatics    Meds  [] Would start aspirin 81MG PO daily as secondary prevention. IF MRI negative can consider discontinuing   [] Would start Atorvastatin 80MG QHS, titrate to LDL<70.  IF MRI negative can consider discontinuing     Other  [] Telemonitoring; Neurochecks and vital signs per unit protocol, Q4H  [] Normotension   [] BG goal <180, avoid hypoglycemia  [] Fall, aspiration precautions  [] PT/OT eval    Above mentioned plan was discussed with patient and available HCP in detail. All the questions were answered and concerns were addressed       Case to be seen by attending during AM rounds

## 2024-03-01 NOTE — CONSULT NOTE ADULT - SUBJECTIVE AND OBJECTIVE BOX
Neurology - Consult Note    -  Spectra: [id number] ([facility]), [phone] ([facility])  -    HPI: Patient FERCHO CARPENTER is a 83y (1940) RH woman with a PMHx significant for dementia (aa0x2 baseline), hypothyroidism, HTN, DM presented to the ED for acute on chronic cognitive changes and aphasia. Patient accompanied by HCP/caretaker who aided in history. As per HCP, she received a call from the patient yesterday stating "I passed out". Patient was found on her couch with her eyes closed, awake but more confused compared to baseline. Patient stated that she went to the kitchen to test her sugar and began feeling dizzy and "passed out" however never lost consciousness. No shaking noted during the episode, denied HA, numbness, acute weakness, vision changes. Patient complained of transient left eye pain and malaise.  HCP noted transient left sided facial droop.     As per HCP, on 1/24 patient lost her best friend and since that date had significant cognitive decline, difficulty taking care of herself, loss of interest in activities.   HCP stated 7 days ago began noticing speech stuttering and worsening forgetfulness however no focal deficits noted.     As per HCP, patient was dx with dementia by PCP in the past as she was having forgetfulness. Had not had MRI brain in the past. Patient is not on aspirin. no toxic habits.     lkw 7 days ago?  mrs 1-2  [facility] 2  out of window for tenecteplase     Review of Systems:    CONSTITUTIONAL: No fevers or chills  EYES AND ENT: No visual changes or no throat pain   NECK: No pain or stiffness  RESPIRATORY: No hemoptysis or shortness of breath  CARDIOVASCULAR: No chest pain or palpitations  GASTROINTESTINAL: No melena or hematochezia  GENITOURINARY: No dysuria or hematuria  NEUROLOGICAL: +As stated in HPI above  SKIN: No itching, burning, rashes, or lesions   All other review of systems is negative unless indicated above.    Allergies:  No Known Allergies      PMHx/PSHx/Family Hx: As above, otherwise see below   Type 2 diabetes mellitus        Social Hx:  No reported use of tobacco, alcohol, or illicit drugs    Medications:  MEDICATIONS  (STANDING):    MEDICATIONS  (PRN):        Home Medications:      Vitals:  T(C): 36.3 (02-28-24 @ 15:27), Max: 36.9 (02-28-24 @ 12:37)  HR: 81 (02-28-24 @ 15:27) (70 - 81)  BP: 163/61 (02-28-24 @ 15:27) (142/86 - 163/61)  RR: 17 (02-28-24 @ 15:27) (17 - 18)  SpO2: 99% (02-28-24 @ 15:27) (97% - 100%)    Physical Examination:    General - NAD  Cardiovascular - Peripheral pulses palpable, no edema    Neurologic Exam:  Mental status - Awake, Alert, Oriented to self (does not know birthdate), place (hospital), not to time (february 14 1942s). Speech notable for paraphasic errors, lack if fluency and unable to repeat. naming intact. Follows simple and complex commands.       Cranial nerves:  CN II: Visual fields are full to confrontation. Pupils are 3 mm and briskly reactive to light.   CN III, IV, VI: EOMI, no nystagmus, no ptosis  CN V: Facial sensation is intact to pinprick in all 3 divisions bilaterally.  CN VII: Face is symmetric with normal eye closure and smile.  CN VII: Hearing is normal to rubbing fingers  CN IX, X: Palate elevates symmetrically. Phonation is normal.  CN XI: Head turning and shoulder shrug are intact  CN XII: Tongue is midline with normal movements and no atrophy.    Motor - Normal bulk and tone throughout. No pronator drift.  Strength testing            Deltoid      Biceps      Triceps     Wrist Extension    Wrist Flexion       R            5                 5               5                     5                              5                        5  L             5                 5               5                     5                              5                       5              Hip Flexion    Hip Extension    Knee Flexion    Knee Extension    Dorsiflexion    Plantar Flexion  R              5                           5                       5                           5                            5                          5  L              5                           5                        5                           5                            5                          5       Sensation - Light touch/temperature intact throughout    DTR's -             Biceps      Triceps     Brachioradialis      Patellar    Ankle    Toes/plantar response  R             2+             2+                  2+                       2+            2+                 Down  L              2+             2+                 2+                        2+           2+                 Down    Coordination - Finger to Nose and heal to shin intact b/l. No tremors appreciated    Gait and station - Not assessed d/t fall risk     Labs:                        10.7   9.09  )-----------( 271      ( 28 Feb 2024 12:16 )             33.1     02-28    134<L>  |  100  |  25<H>  ----------------------------<  120<H>  4.9   |  21<L>  |  1.41<H>    Ca    9.4      28 Feb 2024 16:23  Phos  3.8     02-28  Mg     2.2     02-28    TPro  7.8  /  Alb  4.3  /  TBili  0.4  /  DBili  x   /  AST  19  /  ALT  6<L>  /  AlkPhos  64  02-28    CAPILLARY BLOOD GLUCOSE      POCT Blood Glucose.: 112 mg/dL (28 Feb 2024 16:10)    LIVER FUNCTIONS - ( 28 Feb 2024 12:16 )  Alb: 4.3 g/dL / Pro: 7.8 g/dL / ALK PHOS: 64 U/L / ALT: 6 U/L / AST: 19 U/L / GGT: x                    Radiology:  CT Head No Cont:  (28 Feb 2024 14:01)    < from: CT Angio Neck w/ IV Cont (02.28.24 @ 14:02) >  IMPRESSION:        1.   Brain: No acute abnormalities. There is extensive   periventricular hypoattenuation, compatible with advanced chronic   microvascular ischemic changes.        2.   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  No   hemodynamically significant stenosis.        3.    Right carotid/vertebral artery system:  No hemodynamically   significant stenosis.  No evidence of dissection.        4.   Left carotid/vertebral artery system:  No hemodynamically   significant stenosis.  No evidence of dissection.    < end of copied text >  
CHIEF COMPLAINT:Patient is a 83y old  Female who presents with a chief complaint of Syncope and collapse     (01 Mar 2024 14:14)      HISTORY OF PRESENT ILLNESS:    83 female with history as below PMHx significant for dementia (aa0x2 baseline), hypothyroidism, HTN, DM presented to the ED for acute on chronic cognitive changes and aphasia. Patient accompanied by HCP/caretaker who aided in history. As per HCP, she received a call from the patient yesterday stating "I passed out". Patient was found on her couch with her eyes closed, awake but more confused compared to baseline. Patient stated that she went to the kitchen to test her sugar and began feeling dizzy and "passed out" however never lost consciousness. No shaking noted during the episode, denied HA, numbness, acute weakness, vision changes. Patient complained of transient left eye pain and malaise.  HCP noted transient left sided facial droop. Due to her mental status, subjective information were not able to be obtained from the patient. History was obtained, to the extent possible, from review of the chart and collateral sources of information.       PAST MEDICAL & SURGICAL HISTORY:  Type 2 diabetes mellitus      No significant past surgical history              MEDICATIONS:  aspirin  chewable 81 milliGRAM(s) Oral daily  heparin   Injectable 5000 Unit(s) SubCutaneous every 8 hours        melatonin 3 milliGRAM(s) Oral at bedtime  venlafaxine XR. 75 milliGRAM(s) Oral daily      atorvastatin 80 milliGRAM(s) Oral at bedtime  dextrose 50% Injectable 12.5 Gram(s) IV Push once  dextrose 50% Injectable 25 Gram(s) IV Push once  dextrose 50% Injectable 25 Gram(s) IV Push once  dextrose Oral Gel 15 Gram(s) Oral once PRN  glucagon  Injectable 1 milliGRAM(s) IntraMuscular once  insulin lispro (ADMELOG) corrective regimen sliding scale   SubCutaneous at bedtime  insulin lispro (ADMELOG) corrective regimen sliding scale   SubCutaneous three times a day before meals  levothyroxine 50 MICROGram(s) Oral daily    chlorhexidine 2% Cloths 1 Application(s) Topical daily  dextrose 5%. 1000 milliLiter(s) IV Continuous <Continuous>  dextrose 5%. 1000 milliLiter(s) IV Continuous <Continuous>  influenza  Vaccine (HIGH DOSE) 0.7 milliLiter(s) IntraMuscular once      FAMILY HISTORY:  No pertinent family history in first degree relatives        Non-contributory    SOCIAL HISTORY:    No tobacco, drugs or etoh    Allergies    No Known Allergies    Intolerances    	    REVIEW OF SYSTEMS:  as above  The rest of the 14 points ROS reviewed and except above they are unremarkable.        PHYSICAL EXAM:  T(C): 36.6 (03-01-24 @ 11:22), Max: 36.6 (03-01-24 @ 00:00)  HR: 73 (03-01-24 @ 11:22) (69 - 87)  BP: 145/69 (03-01-24 @ 11:22) (132/64 - 160/72)  RR: 18 (03-01-24 @ 11:22) (17 - 18)  SpO2: 100% (03-01-24 @ 11:22) (97% - 100%)  Wt(kg): --  I&O's Summary    29 Feb 2024 07:01  -  01 Mar 2024 07:00  --------------------------------------------------------  IN: 0 mL / OUT: 1 mL / NET: -1 mL    01 Mar 2024 07:01  -  01 Mar 2024 14:35  --------------------------------------------------------  IN: 520 mL / OUT: 0 mL / NET: 520 mL        JVP: Normal  Neck: supple  Lung: clear   CV: S1 S2 , Murmur:  Abd: soft  Ext: No edema  neuro: Awake / alert  Psych: flat affect  Skin: normal    LABS/DATA:    TELEMETRY: 	    ECG:  	   	  CARDIAC MARKERS:                < from: TTE W or WO Ultrasound Enhancing Agent (02.29.24 @ 14:15) >  CONCLUSIONS:      1. Left ventricular systolic function is normal with an ejection fraction of 67 % bySimpson's method of disks.   2. Normal left ventricular diastolic function, with normal filling pressure.   3. Normal right ventricular cavity size and normal systolic function. Tricuspid annular plane systolic excursion (TAPSE) is 2.3 cm (normal >=1.7 cm).   4. No pericardial effusion seen.   5. Agitated saline injection reveals bubbles in the left heart, consistent with a pulmonary arteriovenous malformation.   6. Agitated saline injection was negative for intracardiac shunt.   7. Estimated pulmonary artery systolic pressure is 25 mmHg.   8. No prior echocardiogram is available for comparison.    ________________________________________________________________________________________    < end of copied text >    03-01    135  |  102  |  24<H>  ----------------------------<  87  4.4   |  25  |  1.40<H>    Ca    9.2      01 Mar 2024 06:18      proBNP:   Lipid Profile:   HgA1c:   TSH:           
Dr. Skaggs  Office [phone] (9 am to 5 pm)  Service : [phone] ( 5pm to 9 am)    Courtney HOOVER      RENAL INITIAL CONSULT NOTE: DATE OF SERVICE 02-28-24 @ 21:20    HPI:  83 yr woman with a PMHx significant for dementia (aa0x2 baseline), hypothyroidism, HTN, DM presented to the ED for acute on chronic cognitive changes and aphasia. Admitted for FTT. I got involved for renal failure    Allergies:  No Known Allergies      PAST MEDICAL & SURGICAL HISTORY:  Type 2 diabetes mellitus      No significant past surgical history          Home Medications Reviewed    Hospital Medications:   MEDICATIONS  (STANDING):  dextrose 5%. 1000 milliLiter(s) (50 mL/Hr) IV Continuous <Continuous>  dextrose 5%. 1000 milliLiter(s) (100 mL/Hr) IV Continuous <Continuous>  dextrose 50% Injectable 25 Gram(s) IV Push once  dextrose 50% Injectable 12.5 Gram(s) IV Push once  dextrose 50% Injectable 25 Gram(s) IV Push once  glucagon  Injectable 1 milliGRAM(s) IntraMuscular once  heparin   Injectable 5000 Unit(s) SubCutaneous every 8 hours  insulin lispro (ADMELOG) corrective regimen sliding scale   SubCutaneous three times a day before meals  levothyroxine 50 MICROGram(s) Oral daily  QUEtiapine 25 milliGRAM(s) Oral once  venlafaxine XR. 75 milliGRAM(s) Oral daily      SOCIAL HISTORY:  Denies ETOh, Smoking,     FAMILY HISTORY:  No pertinent family history in first degree relatives        REVIEW OF SYSTEMS:  CONSTITUTIONAL: No weakness, fevers or chills  EYES/ENT: No visual changes;  No vertigo or throat pain   NECK: No pain or stiffness  RESPIRATORY: No cough, wheezing, hemoptysis; No shortness of breath  CARDIOVASCULAR: No chest pain or palpitations.  GASTROINTESTINAL: No abdominal or epigastric pain. No nausea, vomiting, or hematemesis; No diarrhea or constipation. No melena or hematochezia.  GENITOURINARY: No dysuria, frequency, foamy urine, urinary urgency, incontinence or hematuria  NEUROLOGICAL: No numbness or weakness  SKIN: No itching, burning, rashes, or lesions   VASCULAR: No bilateral lower extremity edema.   All other review of systems is negative unless indicated above.    VITALS:  T(F): 98.2 (02-28-24 @ 20:40), Max: 98.4 (02-28-24 @ 12:37)  HR: 95 (02-28-24 @ 20:40)  BP: 124/61 (02-28-24 @ 20:40)  RR: 18 (02-28-24 @ 20:40)  SpO2: 98% (02-28-24 @ 20:40)  Wt(kg): --      Weight (kg): 43.1 (02-28 @ 11:18)    PHYSICAL EXAM:  Constitutional: NAD  HEENT: anicteric sclera, oropharynx clear, MMM  Neck: No JVD  Respiratory: CTAB, no wheezes, rales or rhonchi  Cardiovascular: S1, S2, RRR  Gastrointestinal: BS+, soft, NT/ND  Extremities: No cyanosis or clubbing. No peripheral edema  Neurological: A/O x 3, no focal deficits  Psychiatric: Normal mood, normal affect  : No CVA tenderness. No boogie.   Skin: No rashes       LABS:  02-28    134<L>  |  100  |  25<H>  ----------------------------<  120<H>  4.9   |  21<L>  |  1.41<H>    Ca    9.4      28 Feb 2024 16:23  Phos  3.8     02-28  Mg     2.2     02-28    TPro  7.8  /  Alb  4.3  /  TBili  0.4  /  DBili      /  AST  19  /  ALT  6<L>  /  AlkPhos  64  02-28    Creatinine Trend: 1.41 <--, 1.61 <--                        10.7   9.09  )-----------( 271      ( 28 Feb 2024 12:16 )             33.1     Urine Studies:  Urinalysis Basic - ( 28 Feb 2024 16:23 )    Color:  / Appearance:  / SG:  / pH:   Gluc: 120 mg/dL / Ketone:   / Bili:  / Urobili:    Blood:  / Protein:  / Nitrite:    Leuk Esterase:  / RBC:  / WBC    Sq Epi:  / Non Sq Epi:  / Bacteria:           RADIOLOGY & ADDITIONAL STUDIES:

## 2024-03-01 NOTE — PROGRESS NOTE ADULT - ASSESSMENT
83 yr  woman with dementia (aa0x2 baseline), hypothyroidism, HTN, DM presented to the ED for acute on chronic cognitive changes and aphasia and fall. Patient accompanied by HCP/caretaker who aided in history. As per HCP, she received a call from the patient  stating "I passed out". Patient was found on her couch with her eyes closed, awake but more confused compared to baseline. Patient stated that she went to the kitchen to test her sugar and began feeling dizzy and "passed out" however never lost consciousness. No shaking noted during the episode, denied HA, numbness, acute weakness, vision changes. Patient complained of transient left eye pain and malaise.  HCP noted transient left sided facial droop.  no tongue bite  no convulsions   no post ictal confusion   A1c 5.9   b12 332   TSH WNL. 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 EF 67%   CTH no acute findings. chronic microvascular changes   CTA H/N with R ICA 4.9x3.8mm aneurysms with wide neck 3mm   CD neg   MRI brain with no acute infarct noted. chronic small vessel changes   orthostatics + [id number]--> 109/64 standing  o/e AAOx2, FELIPE no focal findings. 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 Call with questions.   spoke with HCP  Alo Zuñiga MD  Vascular Neurology  Office: [phone]

## 2024-03-01 NOTE — DIETITIAN INITIAL EVALUATION ADULT - NSFNSGIASSESSMENTFT_GEN_A_CORE
Pt reports no N/V nor diarrhea or constipation. Last BM not documented. Not ordered for bowel regimen.

## 2024-03-02 LAB
ANION GAP SERPL CALC-SCNC: 14 MMOL/L — SIGNIFICANT CHANGE UP (ref 5–17)
BUN SERPL-MCNC: 24 MG/DL — HIGH (ref 7–23)
CALCIUM SERPL-MCNC: 9.4 MG/DL — SIGNIFICANT CHANGE UP (ref 8.4–10.5)
CHLORIDE SERPL-SCNC: 102 MMOL/L — SIGNIFICANT CHANGE UP (ref 96–108)
CO2 SERPL-SCNC: 21 MMOL/L — LOW (ref 22–31)
CREAT ?TM UR-MCNC: 120 MG/DL — SIGNIFICANT CHANGE UP
CREAT SERPL-MCNC: 1.48 MG/DL — HIGH (ref 0.5–1.3)
EGFR: 34 ML/MIN/1.73M2 — LOW
GLUCOSE BLDC GLUCOMTR-MCNC: 100 MG/DL — HIGH (ref 70–99)
GLUCOSE BLDC GLUCOMTR-MCNC: 111 MG/DL — HIGH (ref 70–99)
GLUCOSE BLDC GLUCOMTR-MCNC: 82 MG/DL — SIGNIFICANT CHANGE UP (ref 70–99)
GLUCOSE BLDC GLUCOMTR-MCNC: 95 MG/DL — SIGNIFICANT CHANGE UP (ref 70–99)
GLUCOSE SERPL-MCNC: 114 MG/DL — HIGH (ref 70–99)
POTASSIUM SERPL-MCNC: 4.6 MMOL/L — SIGNIFICANT CHANGE UP (ref 3.5–5.3)
POTASSIUM SERPL-SCNC: 4.6 MMOL/L — SIGNIFICANT CHANGE UP (ref 3.5–5.3)
SODIUM SERPL-SCNC: 137 MMOL/L — SIGNIFICANT CHANGE UP (ref 135–145)
SODIUM UR-SCNC: 91 MMOL/L — SIGNIFICANT CHANGE UP

## 2024-03-02 RX ORDER — MIDODRINE HYDROCHLORIDE 2.5 MG/1
5 TABLET ORAL THREE TIMES A DAY
Refills: 0 | Status: DISCONTINUED | OUTPATIENT
Start: 2024-03-02 | End: 2024-03-04

## 2024-03-02 RX ADMIN — Medication 75 MILLIGRAM(S): at 12:06

## 2024-03-02 RX ADMIN — Medication 50 MICROGRAM(S): at 06:00

## 2024-03-02 RX ADMIN — MIDODRINE HYDROCHLORIDE 5 MILLIGRAM(S): 2.5 TABLET ORAL at 17:28

## 2024-03-02 RX ADMIN — MIDODRINE HYDROCHLORIDE 5 MILLIGRAM(S): 2.5 TABLET ORAL at 11:26

## 2024-03-02 RX ADMIN — HEPARIN SODIUM 5000 UNIT(S): 5000 INJECTION INTRAVENOUS; SUBCUTANEOUS at 13:03

## 2024-03-02 RX ADMIN — ATORVASTATIN CALCIUM 80 MILLIGRAM(S): 80 TABLET, FILM COATED ORAL at 21:01

## 2024-03-02 RX ADMIN — HEPARIN SODIUM 5000 UNIT(S): 5000 INJECTION INTRAVENOUS; SUBCUTANEOUS at 05:59

## 2024-03-02 RX ADMIN — MIDODRINE HYDROCHLORIDE 5 MILLIGRAM(S): 2.5 TABLET ORAL at 08:10

## 2024-03-02 RX ADMIN — CHLORHEXIDINE GLUCONATE 1 APPLICATION(S): 213 SOLUTION TOPICAL at 11:26

## 2024-03-02 RX ADMIN — Medication 81 MILLIGRAM(S): at 11:26

## 2024-03-02 RX ADMIN — HEPARIN SODIUM 5000 UNIT(S): 5000 INJECTION INTRAVENOUS; SUBCUTANEOUS at 21:01

## 2024-03-02 RX ADMIN — Medication 3 MILLIGRAM(S): at 21:01

## 2024-03-02 RX ADMIN — Medication 1 TABLET(S): at 11:26

## 2024-03-02 NOTE — PROGRESS NOTE ADULT - ASSESSMENT
Syncope  unclear if pt truly had syncope   she states she "closed her eyes" and went black   possible vagal reaction   mildly orthostatic  agree with midodrine     Cardiac shunt   echo reviewed couple of bubbles cross less than 6 beats which is likely consistent with intra-cardiac shunt   it appears to be very small shunt  as per neurology and MRI of brain no evidence of acute CVA  hence no indication to close PFO or extra cardiac shunt at this time

## 2024-03-02 NOTE — PROGRESS NOTE ADULT - ASSESSMENT
83 yr woman with a PMHx significant for dementia (aa0x2 baseline), hypothyroidism, HTN, DM presented to the ED for acute on chronic cognitive changes and aphasia. Admitted for FTT.  Nephrology consulted for PARVEEN.    A/P:  PARVEEN:  Etiology?  FeNa 0.8% - suggests pre-renal.  Renal US - no hydro  s/p RL 1L  SCr stable off IVF.  Monitor renal function closely    HTN:  BP fluctuates.  Currently on midodrine 5mg TID.  Low salt diet.  Monitor BP. 83 yr woman with a PMHx significant for dementia (aa0x2 baseline), hypothyroidism, HTN, DM presented to the ED for acute on chronic cognitive changes and aphasia. Admitted for FTT.  Nephrology consulted for PARVEEN.    A/P:  PARVEEN:  Etiology?  FeNa 0.8% - suggests pre-renal.  Renal US - no hydro  s/p RL 1L  SCr worsening again  start 1/2NS with 75meq NaHCO3-75cc/hr for 1L  Monitor renal function closely    HTN:  BP fluctuates.  Currently on midodrine 5mg TID.  Low salt diet.  Monitor BP.

## 2024-03-02 NOTE — PROGRESS NOTE ADULT - ASSESSMENT
83 yr  woman with a PMHx significant for dementia (aa0x2 baseline), hypothyroidism, HTN, DM presented to the ED for acute on chronic cognitive changes and aphasia. Patient accompanied by HCP/caretaker who aided in history. As per HCP, she received a call from the patient yesterday stating "I passed out". Patient was found on her couch with her eyes closed, awake but more confused compared to baseline. Patient stated that she went to the kitchen to test her sugar and began feeling dizzy and "passed out" however never lost consciousness. No shaking noted during the episode, denied HA, numbness, acute weakness, vision changes. Patient complained of transient left eye pain and malaise.  HCP noted transient left sided facial droop.     1 AMS, worsening dementia, FTT   - likely worsening dementia  - neuro fu appreciated  - psych consult   - cards fu   - started midodrine for orthostatic hypotension   - will need rehab placement , pt and family refused     2 DM  - monitor FS  - ISS    3 PARVEEN   - monitor cr  - renal fu     4 Orthostatic hypotension  - cw midodrine     Heparin sc  for DVT prophylaxis

## 2024-03-03 LAB
ANION GAP SERPL CALC-SCNC: 11 MMOL/L — SIGNIFICANT CHANGE UP (ref 5–17)
BUN SERPL-MCNC: 22 MG/DL — SIGNIFICANT CHANGE UP (ref 7–23)
CALCIUM SERPL-MCNC: 9.6 MG/DL — SIGNIFICANT CHANGE UP (ref 8.4–10.5)
CHLORIDE SERPL-SCNC: 102 MMOL/L — SIGNIFICANT CHANGE UP (ref 96–108)
CO2 SERPL-SCNC: 25 MMOL/L — SIGNIFICANT CHANGE UP (ref 22–31)
CREAT ?TM UR-MCNC: 157 MG/DL — SIGNIFICANT CHANGE UP
CREAT SERPL-MCNC: 1.57 MG/DL — HIGH (ref 0.5–1.3)
EGFR: 32 ML/MIN/1.73M2 — LOW
GLUCOSE BLDC GLUCOMTR-MCNC: 115 MG/DL — HIGH (ref 70–99)
GLUCOSE BLDC GLUCOMTR-MCNC: 118 MG/DL — HIGH (ref 70–99)
GLUCOSE BLDC GLUCOMTR-MCNC: 72 MG/DL — SIGNIFICANT CHANGE UP (ref 70–99)
GLUCOSE BLDC GLUCOMTR-MCNC: 91 MG/DL — SIGNIFICANT CHANGE UP (ref 70–99)
GLUCOSE SERPL-MCNC: 115 MG/DL — HIGH (ref 70–99)
POTASSIUM SERPL-MCNC: 4.2 MMOL/L — SIGNIFICANT CHANGE UP (ref 3.5–5.3)
POTASSIUM SERPL-SCNC: 4.2 MMOL/L — SIGNIFICANT CHANGE UP (ref 3.5–5.3)
SODIUM SERPL-SCNC: 138 MMOL/L — SIGNIFICANT CHANGE UP (ref 135–145)
SODIUM UR-SCNC: 73 MMOL/L — SIGNIFICANT CHANGE UP

## 2024-03-03 RX ORDER — SODIUM CHLORIDE 9 MG/ML
1000 INJECTION INTRAMUSCULAR; INTRAVENOUS; SUBCUTANEOUS
Refills: 0 | Status: DISCONTINUED | OUTPATIENT
Start: 2024-03-03 | End: 2024-03-04

## 2024-03-03 RX ADMIN — MIDODRINE HYDROCHLORIDE 5 MILLIGRAM(S): 2.5 TABLET ORAL at 05:05

## 2024-03-03 RX ADMIN — HEPARIN SODIUM 5000 UNIT(S): 5000 INJECTION INTRAVENOUS; SUBCUTANEOUS at 22:22

## 2024-03-03 RX ADMIN — CHLORHEXIDINE GLUCONATE 1 APPLICATION(S): 213 SOLUTION TOPICAL at 11:17

## 2024-03-03 RX ADMIN — HEPARIN SODIUM 5000 UNIT(S): 5000 INJECTION INTRAVENOUS; SUBCUTANEOUS at 13:02

## 2024-03-03 RX ADMIN — Medication 50 MICROGRAM(S): at 05:05

## 2024-03-03 RX ADMIN — ATORVASTATIN CALCIUM 80 MILLIGRAM(S): 80 TABLET, FILM COATED ORAL at 22:22

## 2024-03-03 RX ADMIN — SODIUM CHLORIDE 75 MILLILITER(S): 9 INJECTION INTRAMUSCULAR; INTRAVENOUS; SUBCUTANEOUS at 09:51

## 2024-03-03 RX ADMIN — Medication 81 MILLIGRAM(S): at 11:17

## 2024-03-03 RX ADMIN — MIDODRINE HYDROCHLORIDE 5 MILLIGRAM(S): 2.5 TABLET ORAL at 11:17

## 2024-03-03 RX ADMIN — MIDODRINE HYDROCHLORIDE 5 MILLIGRAM(S): 2.5 TABLET ORAL at 17:17

## 2024-03-03 RX ADMIN — HEPARIN SODIUM 5000 UNIT(S): 5000 INJECTION INTRAVENOUS; SUBCUTANEOUS at 05:06

## 2024-03-03 RX ADMIN — Medication 75 MILLIGRAM(S): at 11:17

## 2024-03-03 RX ADMIN — Medication 3 MILLIGRAM(S): at 22:22

## 2024-03-03 RX ADMIN — Medication 1 TABLET(S): at 11:17

## 2024-03-03 NOTE — PROGRESS NOTE ADULT - ASSESSMENT
83 yr woman with a PMHx significant for dementia (aa0x2 baseline), hypothyroidism, HTN, DM presented to the ED for acute on chronic cognitive changes and aphasia. Admitted for FTT.  Nephrology consulted for PARVEEN.    A/P:  PARVEEN:  Etiology?  FeNa 0.8% - suggests pre-renal.  Renal US - no hydro  s/p RL 1L  SCr worsening again  check UrNa and UaCr   start NS @75cc/hr for 24hrs   Monitor renal function closely    Orthostatic/HTN:  BP fluctuates.  Currently on midodrine 5mg TID for orthostatic hypotension   Low salt diet.  Monitor BP.

## 2024-03-03 NOTE — PROVIDER CONTACT NOTE (OTHER) - ASSESSMENT
Pt A&Ox2, able to make needs known. Pt asymptomatic. Other VSS. No s/s of bleeding. Pt denies cp, denies SOB, denies pain. FS 72, juice given to pt, asymptomatic.
Pt A&O2 on RA. Pt on tele NAD.

## 2024-03-03 NOTE — PROGRESS NOTE ADULT - NUTRITIONAL ASSESSMENT
This patient has been assessed with a concern for Malnutrition and has been determined to have a diagnosis/diagnoses of Moderate protein-calorie malnutrition and Underweight (BMI < 19).    This patient is being managed with:   Diet Regular-  Entered: Feb 29 2024  4:10PM  

## 2024-03-03 NOTE — PROGRESS NOTE ADULT - ASSESSMENT
83 yr  woman with dementia (aa0x2 baseline), hypothyroidism, HTN, DM presented to the ED for acute on chronic cognitive changes and aphasia and fall. Patient accompanied by HCP/caretaker who aided in history. As per HCP, she received a call from the patient  stating "I passed out". Patient was found on her couch with her eyes closed, awake but more confused compared to baseline. Patient stated that she went to the kitchen to test her sugar and began feeling dizzy and "passed out" however never lost consciousness. No shaking noted during the episode, denied HA, numbness, acute weakness, vision changes. Patient complained of transient left eye pain and malaise.  HCP noted transient left sided facial droop.  no tongue bite  no convulsions   no post ictal confusion   A1c 5.9   b12 332   TSH WNL. 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 EF 67%   CTH no acute findings. chronic microvascular changes   CTA H/N with R ICA 4.9x3.8mm aneurysms with wide neck 3mm   CD neg   MRI brain with no acute infarct noted. chronic small vessel changes   orthostatics + [id number]--> 109/64 standing  o/e AAOx2, FELIPE no focal findings. 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 Call with questions.   spoke with HCP  Alo Zuñiga MD  Vascular Neurology  Office: [phone]

## 2024-03-04 ENCOUNTER — NON-APPOINTMENT (OUTPATIENT)
Age: 84
End: 2024-03-04

## 2024-03-04 VITALS
TEMPERATURE: 98 F | RESPIRATION RATE: 18 BRPM | HEART RATE: 70 BPM | SYSTOLIC BLOOD PRESSURE: 137 MMHG | DIASTOLIC BLOOD PRESSURE: 69 MMHG | OXYGEN SATURATION: 99 %

## 2024-03-04 LAB
ANION GAP SERPL CALC-SCNC: 9 MMOL/L — SIGNIFICANT CHANGE UP (ref 5–17)
BUN SERPL-MCNC: 22 MG/DL — SIGNIFICANT CHANGE UP (ref 7–23)
CALCIUM SERPL-MCNC: 8.9 MG/DL — SIGNIFICANT CHANGE UP (ref 8.4–10.5)
CHLORIDE SERPL-SCNC: 106 MMOL/L — SIGNIFICANT CHANGE UP (ref 96–108)
CO2 SERPL-SCNC: 22 MMOL/L — SIGNIFICANT CHANGE UP (ref 22–31)
CREAT SERPL-MCNC: 1.55 MG/DL — HIGH (ref 0.5–1.3)
EGFR: 33 ML/MIN/1.73M2 — LOW
GLUCOSE BLDC GLUCOMTR-MCNC: 82 MG/DL — SIGNIFICANT CHANGE UP (ref 70–99)
GLUCOSE BLDC GLUCOMTR-MCNC: 90 MG/DL — SIGNIFICANT CHANGE UP (ref 70–99)
GLUCOSE SERPL-MCNC: 81 MG/DL — SIGNIFICANT CHANGE UP (ref 70–99)
POTASSIUM SERPL-MCNC: 4.3 MMOL/L — SIGNIFICANT CHANGE UP (ref 3.5–5.3)
POTASSIUM SERPL-SCNC: 4.3 MMOL/L — SIGNIFICANT CHANGE UP (ref 3.5–5.3)
SODIUM SERPL-SCNC: 137 MMOL/L — SIGNIFICANT CHANGE UP (ref 135–145)

## 2024-03-04 PROCEDURE — 84425 ASSAY OF VITAMIN B-1: CPT

## 2024-03-04 PROCEDURE — 80307 DRUG TEST PRSMV CHEM ANLYZR: CPT

## 2024-03-04 PROCEDURE — 70498 CT ANGIOGRAPHY NECK: CPT | Mod: MC

## 2024-03-04 PROCEDURE — 87640 STAPH A DNA AMP PROBE: CPT

## 2024-03-04 PROCEDURE — 84484 ASSAY OF TROPONIN QUANT: CPT

## 2024-03-04 PROCEDURE — 86780 TREPONEMA PALLIDUM: CPT

## 2024-03-04 PROCEDURE — 76770 US EXAM ABDO BACK WALL COMP: CPT

## 2024-03-04 PROCEDURE — 84300 ASSAY OF URINE SODIUM: CPT

## 2024-03-04 PROCEDURE — 82746 ASSAY OF FOLIC ACID SERUM: CPT

## 2024-03-04 PROCEDURE — 80061 LIPID PANEL: CPT

## 2024-03-04 PROCEDURE — 80048 BASIC METABOLIC PNL TOTAL CA: CPT

## 2024-03-04 PROCEDURE — 85025 COMPLETE CBC W/AUTO DIFF WBC: CPT

## 2024-03-04 PROCEDURE — 70551 MRI BRAIN STEM W/O DYE: CPT | Mod: MC

## 2024-03-04 PROCEDURE — 87637 SARSCOV2&INF A&B&RSV AMP PRB: CPT

## 2024-03-04 PROCEDURE — 86376 MICROSOMAL ANTIBODY EACH: CPT

## 2024-03-04 PROCEDURE — 81001 URINALYSIS AUTO W/SCOPE: CPT

## 2024-03-04 PROCEDURE — 82550 ASSAY OF CK (CPK): CPT

## 2024-03-04 PROCEDURE — 82306 VITAMIN D 25 HYDROXY: CPT

## 2024-03-04 PROCEDURE — 99285 EMERGENCY DEPT VISIT HI MDM: CPT | Mod: 25

## 2024-03-04 PROCEDURE — 83036 HEMOGLOBIN GLYCOSYLATED A1C: CPT

## 2024-03-04 PROCEDURE — 87641 MR-STAPH DNA AMP PROBE: CPT

## 2024-03-04 PROCEDURE — 84100 ASSAY OF PHOSPHORUS: CPT

## 2024-03-04 PROCEDURE — 93306 TTE W/DOPPLER COMPLETE: CPT

## 2024-03-04 PROCEDURE — 70496 CT ANGIOGRAPHY HEAD: CPT | Mod: MC

## 2024-03-04 PROCEDURE — 87086 URINE CULTURE/COLONY COUNT: CPT

## 2024-03-04 PROCEDURE — 84207 ASSAY OF VITAMIN B-6: CPT

## 2024-03-04 PROCEDURE — 82962 GLUCOSE BLOOD TEST: CPT

## 2024-03-04 PROCEDURE — 82140 ASSAY OF AMMONIA: CPT

## 2024-03-04 PROCEDURE — 83735 ASSAY OF MAGNESIUM: CPT

## 2024-03-04 PROCEDURE — 70450 CT HEAD/BRAIN W/O DYE: CPT | Mod: MC

## 2024-03-04 PROCEDURE — 80053 COMPREHEN METABOLIC PANEL: CPT

## 2024-03-04 PROCEDURE — 84443 ASSAY THYROID STIM HORMONE: CPT

## 2024-03-04 PROCEDURE — 71045 X-RAY EXAM CHEST 1 VIEW: CPT

## 2024-03-04 PROCEDURE — 83605 ASSAY OF LACTIC ACID: CPT

## 2024-03-04 PROCEDURE — 84480 ASSAY TRIIODOTHYRONINE (T3): CPT

## 2024-03-04 PROCEDURE — 36415 COLL VENOUS BLD VENIPUNCTURE: CPT

## 2024-03-04 PROCEDURE — 97161 PT EVAL LOW COMPLEX 20 MIN: CPT

## 2024-03-04 PROCEDURE — 82570 ASSAY OF URINE CREATININE: CPT

## 2024-03-04 PROCEDURE — 82607 VITAMIN B-12: CPT

## 2024-03-04 PROCEDURE — 93880 EXTRACRANIAL BILAT STUDY: CPT

## 2024-03-04 PROCEDURE — 84436 ASSAY OF TOTAL THYROXINE: CPT

## 2024-03-04 RX ORDER — ASPIRIN/CALCIUM CARB/MAGNESIUM 324 MG
1 TABLET ORAL
Qty: 30 | Refills: 0
Start: 2024-03-04 | End: 2024-04-02

## 2024-03-04 RX ORDER — QUETIAPINE FUMARATE 200 MG/1
25 TABLET, FILM COATED ORAL ONCE
Refills: 0 | Status: COMPLETED | OUTPATIENT
Start: 2024-03-04 | End: 2024-03-04

## 2024-03-04 RX ORDER — MIDODRINE HYDROCHLORIDE 2.5 MG/1
1 TABLET ORAL
Qty: 90 | Refills: 0
Start: 2024-03-04 | End: 2024-04-02

## 2024-03-04 RX ORDER — MULTIVIT-MIN/FERROUS GLUCONATE 9 MG/15 ML
1 LIQUID (ML) ORAL
Qty: 0 | Refills: 0 | DISCHARGE
Start: 2024-03-04

## 2024-03-04 RX ADMIN — SODIUM CHLORIDE 75 MILLILITER(S): 9 INJECTION INTRAMUSCULAR; INTRAVENOUS; SUBCUTANEOUS at 05:12

## 2024-03-04 RX ADMIN — QUETIAPINE FUMARATE 25 MILLIGRAM(S): 200 TABLET, FILM COATED ORAL at 15:25

## 2024-03-04 RX ADMIN — Medication 1 TABLET(S): at 11:17

## 2024-03-04 RX ADMIN — MIDODRINE HYDROCHLORIDE 5 MILLIGRAM(S): 2.5 TABLET ORAL at 16:31

## 2024-03-04 RX ADMIN — Medication 75 MILLIGRAM(S): at 11:17

## 2024-03-04 RX ADMIN — MIDODRINE HYDROCHLORIDE 5 MILLIGRAM(S): 2.5 TABLET ORAL at 05:12

## 2024-03-04 RX ADMIN — HEPARIN SODIUM 5000 UNIT(S): 5000 INJECTION INTRAVENOUS; SUBCUTANEOUS at 05:11

## 2024-03-04 RX ADMIN — MIDODRINE HYDROCHLORIDE 5 MILLIGRAM(S): 2.5 TABLET ORAL at 11:16

## 2024-03-04 RX ADMIN — Medication 50 MICROGRAM(S): at 05:12

## 2024-03-04 RX ADMIN — Medication 81 MILLIGRAM(S): at 11:17

## 2024-03-04 RX ADMIN — HEPARIN SODIUM 5000 UNIT(S): 5000 INJECTION INTRAVENOUS; SUBCUTANEOUS at 13:04

## 2024-03-04 RX ADMIN — CHLORHEXIDINE GLUCONATE 1 APPLICATION(S): 213 SOLUTION TOPICAL at 11:17

## 2024-03-04 NOTE — PROGRESS NOTE ADULT - SUBJECTIVE AND OBJECTIVE BOX
Subjective: Patient seen and examined. No new events except as noted.     SUBJECTIVE/ROS:  nad      MEDICATIONS:  MEDICATIONS  (STANDING):  aspirin  chewable 81 milliGRAM(s) Oral daily  atorvastatin 80 milliGRAM(s) Oral at bedtime  chlorhexidine 2% Cloths 1 Application(s) Topical daily  dextrose 5%. 1000 milliLiter(s) (50 mL/Hr) IV Continuous <Continuous>  dextrose 5%. 1000 milliLiter(s) (100 mL/Hr) IV Continuous <Continuous>  dextrose 50% Injectable 25 Gram(s) IV Push once  dextrose 50% Injectable 12.5 Gram(s) IV Push once  dextrose 50% Injectable 25 Gram(s) IV Push once  glucagon  Injectable 1 milliGRAM(s) IntraMuscular once  heparin   Injectable 5000 Unit(s) SubCutaneous every 8 hours  influenza  Vaccine (HIGH DOSE) 0.7 milliLiter(s) IntraMuscular once  insulin lispro (ADMELOG) corrective regimen sliding scale   SubCutaneous three times a day before meals  insulin lispro (ADMELOG) corrective regimen sliding scale   SubCutaneous at bedtime  levothyroxine 50 MICROGram(s) Oral daily  melatonin 3 milliGRAM(s) Oral at bedtime  midodrine. 5 milliGRAM(s) Oral <User Schedule>  multivitamin/minerals 1 Tablet(s) Oral daily  venlafaxine XR. 75 milliGRAM(s) Oral daily      PHYSICAL EXAM:  T(C): 36.5 (03-02-24 @ 05:02), Max: 36.7 (03-02-24 @ 00:11)  HR: 74 (03-02-24 @ 05:02) (64 - 76)  BP: 149/75 (03-02-24 @ 05:50) (136/70 - 170/73)  RR: 18 (03-02-24 @ 05:02) (18 - 18)  SpO2: 98% (03-02-24 @ 05:02) (97% - 100%)  Wt(kg): --  I&O's Summary    01 Mar 2024 07:01  -  02 Mar 2024 07:00  --------------------------------------------------------  IN: 520 mL / OUT: 0 mL / NET: 520 mL            JVP: Normal  Neck: supple  Lung: clear   CV: S1 S2 , Murmur:  Abd: soft  Ext: No edema  neuro: Awake / alert  Psych: flat affect  Skin: normal``    LABS/DATA:    CARDIAC MARKERS:  CARDIAC MARKERS ( 29 Feb 2024 07:51 )  x     / x     / 68 U/L / x     / x                03-01    135  |  102  |  24<H>  ----------------------------<  87  4.4   |  25  |  1.40<H>    Ca    9.2      01 Mar 2024 06:18      proBNP:   Lipid Profile:   HgA1c:   TSH:     TELE:  EKG:        
[facility]   MD SAAD HERNANDEZ MD ANGELA WONG, SNEHA DE LEON, NP    TEL:  OFFICE: [phone]  From 5pm-7am Answering Service [phone]    -- RENAL FOLLOW UP NOTE ---Date of Service 03-02-24 @ 11:56    Patient is a 83y old  Female who presents with a chief complaint of cognitive changes, FTT.    Patient seen and examined at bedside. No chest pain/SOB.    VITALS:  T(F): 97.8 (03-02-24 @ 08:01), Max: 98 (03-02-24 @ 00:11)  HR: 74 (03-02-24 @ 08:01)  BP: 155/63 (03-02-24 @ 08:01)  RR: 18 (03-02-24 @ 08:01)  SpO2: 100% (03-02-24 @ 08:01)  Wt(kg): --    03-01 @ 07:01  -  03-02 @ 07:00  --------------------------------------------------------  IN: 520 mL / OUT: 0 mL / NET: 520 mL    03-02 @ 07:01  -  03-02 @ 11:56  --------------------------------------------------------  IN: 220 mL / OUT: 0 mL / NET: 220 mL      PHYSICAL EXAM:  General: NAD  Neck: No JVD  Respiratory: CTAB, no wheezes, rales or rhonchi  Cardiovascular: S1, S2, RRR  Gastrointestinal: BS+, soft, NT/ND  Extremities: No peripheral edema    Hospital Medications:   MEDICATIONS  (STANDING):  aspirin  chewable 81 milliGRAM(s) Oral daily  atorvastatin 80 milliGRAM(s) Oral at bedtime  chlorhexidine 2% Cloths 1 Application(s) Topical daily  dextrose 5%. 1000 milliLiter(s) (50 mL/Hr) IV Continuous <Continuous>  dextrose 5%. 1000 milliLiter(s) (100 mL/Hr) IV Continuous <Continuous>  dextrose 50% Injectable 25 Gram(s) IV Push once  dextrose 50% Injectable 12.5 Gram(s) IV Push once  dextrose 50% Injectable 25 Gram(s) IV Push once  glucagon  Injectable 1 milliGRAM(s) IntraMuscular once  heparin   Injectable 5000 Unit(s) SubCutaneous every 8 hours  influenza  Vaccine (HIGH DOSE) 0.7 milliLiter(s) IntraMuscular once  insulin lispro (ADMELOG) corrective regimen sliding scale   SubCutaneous at bedtime  insulin lispro (ADMELOG) corrective regimen sliding scale   SubCutaneous three times a day before meals  levothyroxine 50 MICROGram(s) Oral daily  melatonin 3 milliGRAM(s) Oral at bedtime  midodrine. 5 milliGRAM(s) Oral three times a day  multivitamin/minerals 1 Tablet(s) Oral daily  venlafaxine XR. 75 milliGRAM(s) Oral daily      LABS:  03-02    137  |  102  |  24<H>  ----------------------------<  114<H>  4.6   |  21<L>  |  1.48<H>    Ca    9.4      02 Mar 2024 10:30      Creatinine Trend: 1.48 <--, 1.40 <--, 1.45 <--, 1.41 <--, 1.61 <--      Urine Studies:  Urinalysis - [03-02-24 @ 10:30]      Color  / Appearance  / SG  / pH       Gluc 114 / Ketone   / Bili  / Urobili        Blood  / Protein  / Leuk Est  / Nitrite       RBC  / WBC  / Hyaline  / Gran  / Sq Epi  / Non Sq Epi  / Bacteria     Urine Creatinine 120      [03-02-24 @ 04:52]  Urine Sodium 91      [03-02-24 @ 04:52]    Vitamin D (25OH) 24.1      [02-29-24 @ 07:51]  TSH 0.50      [02-28-24 @ 12:16]  Lipid: chol 196, TG 70, HDL 65, LDL --      [02-29-24 @ 07:51]      Syphilis Screen (Treponema Pallidum Ab) Negative      [02-29-24 @ 07:51]    
Neurology Progress Note    S: Patient seen and examined. No new events overnight. patient denied CP, SOB, HA or pain.     Medication:  aspirin  chewable 81 milliGRAM(s) Oral daily  atorvastatin 80 milliGRAM(s) Oral at bedtime  chlorhexidine 2% Cloths 1 Application(s) Topical daily  dextrose 5%. 1000 milliLiter(s) IV Continuous <Continuous>  dextrose 5%. 1000 milliLiter(s) IV Continuous <Continuous>  dextrose 50% Injectable 25 Gram(s) IV Push once  dextrose 50% Injectable 12.5 Gram(s) IV Push once  dextrose 50% Injectable 25 Gram(s) IV Push once  dextrose Oral Gel 15 Gram(s) Oral once PRN  glucagon  Injectable 1 milliGRAM(s) IntraMuscular once  heparin   Injectable 5000 Unit(s) SubCutaneous every 8 hours  influenza  Vaccine (HIGH DOSE) 0.7 milliLiter(s) IntraMuscular once  insulin lispro (ADMELOG) corrective regimen sliding scale   SubCutaneous at bedtime  insulin lispro (ADMELOG) corrective regimen sliding scale   SubCutaneous three times a day before meals  levothyroxine 50 MICROGram(s) Oral daily  melatonin 3 milliGRAM(s) Oral at bedtime  venlafaxine XR. 75 milliGRAM(s) Oral daily      Vitals:  Vital Signs Last 24 Hrs  T(C): 36.6 (01 Mar 2024 11:22), Max: 36.6 (01 Mar 2024 00:00)  T(F): 97.9 (01 Mar 2024 11:22), Max: 97.9 (01 Mar 2024 00:00)  HR: 73 (01 Mar 2024 11:22) (69 - 87)  BP: 145/69 (01 Mar 2024 11:22) (132/64 - 160/72)  BP(mean): --  RR: 18 (01 Mar 2024 11:22) (17 - 18)  SpO2: 100% (01 Mar 2024 11:22) (97% - 100%)    Parameters below as of 01 Mar 2024 11:22  Patient On (Oxygen Delivery Method): room air    Orthostatic VS    02-29-24 @ 04:00  Lying BP: Orthostatic BP (Lying Systolic): 169/Orthostatic BP (Lying Diastolic (mm Hg)): 70 HR: Orthostatic Pulse (Heart Rate (beats/min)): 65   Sitting BP: Orthostatic BP (Sitting Systolic): 122/Orthostatic BP (Sitting Diastolic (mm Hg)): 76 HR: Orthostatic Pulse (Heart Rate (beats/min)): 80  Standing BP: Orthostatic BP (Standing Systolic): 109/Orthostatic BP (Standing Diastolic (mm Hg)): 64 HR: Orthostatic Pulse (Heart Rate (beats/min)): 68  Site: --   Mode: --      General Exam:   General Appearance: Appropriately dressed and in no acute distress       Head: Normocephalic, atraumatic and no dysmorphic features  Ear, Nose, and Throat: Moist mucous membranes  CVS: S1S2+  Resp: No SOB, no wheeze or rhonchi  Abd: soft NTND  Extremities: No edema, no cyanosis  Skin: No bruises, no rashes     Neurological Exam:  Mental Status: Awake, alert and oriented x 2  Able to follow simple and complex verbal commands. Able to name and repeat. fluent speech. No obvious aphasia or dysarthria noted.   Cranial Nerves: PERRL, EOMI, VFFC, sensation V1-V3 intact,  no obvious facial asymmetry , equal elevation of palate, scm/trap 5/5, tongue is midline on protrusion. no obvious papilledema on fundoscopic exam. Hearing is grossly intact.   Motor: Normal bulk, tone and strength throughout. Fine finger movements were intact and symmetric. no tremors or drift noted.    Sensation: Intact to light touch and pinprick throughout. no right/left confusion. no extinction to tactile on DSS.   Reflexes: 1+ throughout at biceps, brachioradialis, triceps, patellars and ankles bilaterally and equal. No clonus. R toe and L toe were both downgoing.  Coordination: No dysmetria on FNF or HKS  Gait: deferred     I personally reviewed the below data/images/labs:        03-01    135  |  102  |  24<H>  ----------------------------<  87  4.4   |  25  |  1.40<H>    Ca    9.2      01 Mar 2024 06:18          Urinalysis Basic - ( 01 Mar 2024 06:18 )    Color: x / Appearance: x / SG: x / pH: x  Gluc: 87 mg/dL / Ketone: x  / Bili: x / Urobili: x   Blood: x / Protein: x / Nitrite: x   Leuk Esterase: x / RBC: x / WBC x   Sq Epi: x / Non Sq Epi: x / Bacteria: x      < from: MR Head No Cont (02.29.24 @ 12:57) >    ACC: [id number] EXAM:  MR BRAIN   ORDERED BY:  ANGEL ORTEGA     PROCEDURE DATE:  02/29/2024          INTERPRETATION:  EXAM: MRI OF THE BRAIN WITHOUT CONTRAST    HISTORY: Syncopal episode, altered mental status, acute on chronic   dementia    TECHNIQUE: Multi-planar multi-sequential MR imaging of the brain was   performed without intravenous contrast.    COMPARISON: CT/CTA of the head February 28, 2024.    FINDINGS:    Study is mildly motion degraded.    No diffusion restriction to suggest acuteinfarct. Foci of increased   T2/FLAIR signal throughout the subcortical, deep, and periventricular   white matter, compatible with chronic small vessel disease. Parenchymal   volume loss resulting in a mild ex vacuo dilatation appearance of the   bilateral ventricles. The visualized extra axial spaces and basal   cisterns are within normal limits. No midline shift or mass effect   present.    The craniocervical junction is within normal limits. The pituitary is   unremarkable. The major intracranial vessels demonstrate the expected   signal void related to vascular flow. The paranasal sinuses are well   aerated. The mastoid air cells are well aerated. The visualized orbits   are status post cataract surgery.      IMPRESSION:    1.  No evidence of acute infarct or midline shift.  2.  Chronic small vessel disease.    --- End of Report ---            MIKE EATON MD; Attending Radiologist  This document has been electronically signed. Feb 29 2024  1:47PM    < end of copied text >  < from: CT Angio Neck w/ IV Cont (02.28.24 @ 14:02) >    ACC: [id number] EXAM:  CT ANGIO BRAIN (W)AW IC   ORDERED BY: CORINA FLOWERS     ACC: [id number] EXAM:  CT ANGIO NECK (W)AW IC   ORDERED BY: CORINA FLOWERS     ACC: [id number] EXAM:  CT BRAIN   ORDERED BY: CORINA FLOWERS     PROCEDURE DATE:  02/28/2024          INTERPRETATION:  Exam Date: 2/28/2024 2:01 PM    Three examinations were performed on this patient:  1. CT Angiography of the carotid arteries with and without IV contrast  2. CT Angiography of the intracranial circulation with and without IV   contrast      CLINICAL INFORMATION:  pt has been off for the past week, worsening over   the past 24 hrs, L facial droo    TECHNIQUE:   Preceding intravenous contrast contiguous axial 4 mm   sections were obtained through the head. CT angiography images were   acquired from the aortic arch to the vertex of the skull.   Images were   acquired during rapid bolus intravenous administration of 90 mL of   Omnipaque 350 contrast with 10 mL discarded.  3D, coronal, and sagittal   reformats were obtained.    COMPARISON:     None    FINDINGS:    The brain demonstrates no acute cerebral cortical infarct is seen. No   acute hemorrhage is present. There is extensive periventricular   hypoattenuation, compatible with advanced chronic microvascular ischemic   changes. No extra-axial fluid collection. Ventricles and sulci are   prominent, compatible with generalized cerebral line loss.    The paranasal sinuses and mastoid air cells are clear.    Intracranial vertebral arteries are intact without evidence of dissection   or hemodynamically significant stenosis. The basilar artery and posterior   cerebral arteries and are normal without hemodynamically significant   stenosis. Bilateral superior cerebellar arteries are intact.    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    The intracranial internal carotid arteries is without hemodynamically   significant stenosis. The middle cerebral arteries, and anterior cerebral   arteries are intact without hemodynamically significant stenosis.  There   is no evidence of aneurysm or vascular malformation.    The carotid circulation is intact without hemodynamically significant   stenosis.   The vertebral arteries are patent.      IMPRESSION:        1.   Brain: No acute abnormalities. There is extensive   periventricular hypoattenuation, compatible with advanced chronic   microvascular ischemic changes.        2.   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  No   hemodynamically significant stenosis.        3.    Right carotid/vertebral artery system:  No hemodynamically   significant stenosis.  No evidence of dissection.        4.   Left carotid/vertebral artery system:  No hemodynamically   significant stenosis.  No evidence of dissection.    --- End of Report ---            PETER BENITEZ MD; Attending Radiologist  This document has been electronically signed. Feb 28 2024  2:16PM    < end of copied text >  
Neurology Progress Note    S: Patient seen and examined. No new events overnight. patient denied CP, SOB, HA or pain. orthostatics better but still +     Medication:    Medications: MEDICATIONS  (STANDING):  aspirin  chewable 81 milliGRAM(s) Oral daily  atorvastatin 80 milliGRAM(s) Oral at bedtime  chlorhexidine 2% Cloths 1 Application(s) Topical daily  dextrose 5%. 1000 milliLiter(s) (50 mL/Hr) IV Continuous <Continuous>  dextrose 5%. 1000 milliLiter(s) (100 mL/Hr) IV Continuous <Continuous>  dextrose 50% Injectable 25 Gram(s) IV Push once  dextrose 50% Injectable 12.5 Gram(s) IV Push once  dextrose 50% Injectable 25 Gram(s) IV Push once  glucagon  Injectable 1 milliGRAM(s) IntraMuscular once  heparin   Injectable 5000 Unit(s) SubCutaneous every 8 hours  influenza  Vaccine (HIGH DOSE) 0.7 milliLiter(s) IntraMuscular once  insulin lispro (ADMELOG) corrective regimen sliding scale   SubCutaneous at bedtime  insulin lispro (ADMELOG) corrective regimen sliding scale   SubCutaneous three times a day before meals  levothyroxine 50 MICROGram(s) Oral daily  melatonin 3 milliGRAM(s) Oral at bedtime  midodrine. 5 milliGRAM(s) Oral three times a day  multivitamin/minerals 1 Tablet(s) Oral daily  sodium chloride 0.9%. 1000 milliLiter(s) (75 mL/Hr) IV Continuous <Continuous>  venlafaxine XR. 75 milliGRAM(s) Oral daily    MEDICATIONS  (PRN):  dextrose Oral Gel 15 Gram(s) Oral once PRN Blood Glucose LESS THAN 70 milliGRAM(s)/deciliter       Vitals:  Vital Signs Last 24 Hrs  T(C): 36.4 (03 Mar 2024 04:52), Max: 36.8 (02 Mar 2024 20:39)  T(F): 97.6 (03 Mar 2024 04:52), Max: 98.3 (02 Mar 2024 20:39)  HR: 68 (03 Mar 2024 04:52) (68 - 74)  BP: 159/76 (03 Mar 2024 04:52) (147/76 - 159/76)  BP(mean): --  RR: 18 (03 Mar 2024 04:52) (18 - 18)  SpO2: 97% (03 Mar 2024 04:52) (97% - 98%)    Parameters below as of 03 Mar 2024 04:52  Patient On (Oxygen Delivery Method): room air            Orthostatic VS    02-29-24 @ 04:00  Lying BP: Orthostatic BP (Lying Systolic): 169/Orthostatic BP (Lying Diastolic (mm Hg)): 70 HR: Orthostatic Pulse (Heart Rate (beats/min)): 65   Sitting BP: Orthostatic BP (Sitting Systolic): 122/Orthostatic BP (Sitting Diastolic (mm Hg)): 76 HR: Orthostatic Pulse (Heart Rate (beats/min)): 80  Standing BP: Orthostatic BP (Standing Systolic): 109/Orthostatic BP (Standing Diastolic (mm Hg)): 64 HR: Orthostatic Pulse (Heart Rate (beats/min)): 68  Site: --   Mode: --      General Exam:   General Appearance: Appropriately dressed and in no acute distress       Head: Normocephalic, atraumatic and no dysmorphic features  Ear, Nose, and Throat: Moist mucous membranes  CVS: S1S2+  Resp: No SOB, no wheeze or rhonchi  Abd: soft NTND  Extremities: No edema, no cyanosis  Skin: No bruises, no rashes     Neurological Exam:  Mental Status: Awake, alert and oriented x 2  Able to follow simple and complex verbal commands. Able to name and repeat. fluent speech. No obvious aphasia or dysarthria noted.   Cranial Nerves: PERRL, EOMI, VFFC, sensation V1-V3 intact,  no obvious facial asymmetry , equal elevation of palate, scm/trap 5/5, tongue is midline on protrusion. no obvious papilledema on fundoscopic exam. Hearing is grossly intact.   Motor: Normal bulk, tone and strength throughout. Fine finger movements were intact and symmetric. no tremors or drift noted.    Sensation: Intact to light touch and pinprick throughout. no right/left confusion. no extinction to tactile on DSS.   Reflexes: 1+ throughout at biceps, brachioradialis, triceps, patellars and ankles bilaterally and equal. No clonus. R toe and L toe were both downgoing.  Coordination: No dysmetria on FNF or HKS  Gait: deferred     I personally reviewed the below data/images/labs:          LABS:      03-03    138  |  102  |  22  ----------------------------<  115<H>  4.2   |  25  |  1.57<H>    Ca    9.6      03 Mar 2024 06:32          Urinalysis Basic - ( 03 Mar 2024 06:32 )    Color: x / Appearance: x / SG: x / pH: x  Gluc: 115 mg/dL / Ketone: x  / Bili: x / Urobili: x   Blood: x / Protein: x / Nitrite: x   Leuk Esterase: x / RBC: x / WBC x   Sq Epi: x / Non Sq Epi: x / Bacteria: x            < from: MR Head No Cont (02.29.24 @ 12:57) >    ACC: [id number] EXAM:  MR BRAIN   ORDERED BY:  ANGEL ORTEGA     PROCEDURE DATE:  02/29/2024          INTERPRETATION:  EXAM: MRI OF THE BRAIN WITHOUT CONTRAST    HISTORY: Syncopal episode, altered mental status, acute on chronic   dementia    TECHNIQUE: Multi-planar multi-sequential MR imaging of the brain was   performed without intravenous contrast.    COMPARISON: CT/CTA of the head February 28, 2024.    FINDINGS:    Study is mildly motion degraded.    No diffusion restriction to suggest acuteinfarct. Foci of increased   T2/FLAIR signal throughout the subcortical, deep, and periventricular   white matter, compatible with chronic small vessel disease. Parenchymal   volume loss resulting in a mild ex vacuo dilatation appearance of the   bilateral ventricles. The visualized extra axial spaces and basal   cisterns are within normal limits. No midline shift or mass effect   present.    The craniocervical junction is within normal limits. The pituitary is   unremarkable. The major intracranial vessels demonstrate the expected   signal void related to vascular flow. The paranasal sinuses are well   aerated. The mastoid air cells are well aerated. The visualized orbits   are status post cataract surgery.      IMPRESSION:    1.  No evidence of acute infarct or midline shift.  2.  Chronic small vessel disease.    --- End of Report ---            MIKE EATON MD; Attending Radiologist  This document has been electronically signed. Feb 29 2024  1:47PM    < end of copied text >  < from: CT Angio Neck w/ IV Cont (02.28.24 @ 14:02) >    ACC: [id number] EXAM:  CT ANGIO BRAIN (W)AW IC   ORDERED BY: CORINA FLOWERS     ACC: [id number] EXAM:  CT ANGIO NECK (W)AW IC   ORDERED BY: CORINA FLOWERS     ACC: [id number] EXAM:  CT BRAIN   ORDERED BY: CORINA KRISTIE     PROCEDURE DATE:  02/28/2024          INTERPRETATION:  Exam Date: 2/28/2024 2:01 PM    Three examinations were performed on this patient:  1. CT Angiography of the carotid arteries with and without IV contrast  2. CT Angiography of the intracranial circulation with and without IV   contrast      CLINICAL INFORMATION:  pt has been off for the past week, worsening over   the past 24 hrs, L facial droo    TECHNIQUE:   Preceding intravenous contrast contiguous axial 4 mm   sections were obtained through the head. CT angiography images were   acquired from the aortic arch to the vertex of the skull.   Images were   acquired during rapid bolus intravenous administration of 90 mL of   Omnipaque 350 contrast with 10 mL discarded.  3D, coronal, and sagittal   reformats were obtained.    COMPARISON:     None    FINDINGS:    The brain demonstrates no acute cerebral cortical infarct is seen. No   acute hemorrhage is present. There is extensive periventricular   hypoattenuation, compatible with advanced chronic microvascular ischemic   changes. No extra-axial fluid collection. Ventricles and sulci are   prominent, compatible with generalized cerebral line loss.    The paranasal sinuses and mastoid air cells are clear.    Intracranial vertebral arteries are intact without evidence of dissection   or hemodynamically significant stenosis. The basilar artery and posterior   cerebral arteries and are normal without hemodynamically significant   stenosis. Bilateral superior cerebellar arteries are intact.    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    The intracranial internal carotid arteries is without hemodynamically   significant stenosis. The middle cerebral arteries, and anterior cerebral   arteries are intact without hemodynamically significant stenosis.  There   is no evidence of aneurysm or vascular malformation.    The carotid circulation is intact without hemodynamically significant   stenosis.   The vertebral arteries are patent.      IMPRESSION:        1.   Brain: No acute abnormalities. There is extensive   periventricular hypoattenuation, compatible with advanced chronic   microvascular ischemic changes.        2.   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  No   hemodynamically significant stenosis.        3.    Right carotid/vertebral artery system:  No hemodynamically   significant stenosis.  No evidence of dissection.        4.   Left carotid/vertebral artery system:  No hemodynamically   significant stenosis.  No evidence of dissection.    --- End of Report ---            PETER BENITEZ MD; Attending Radiologist  This document has been electronically signed. Feb 28 2024  2:16PM    < end of copied text >  
Neurology Progress Note    S: Patient seen and examined. No new events overnight. patient denied CP, SOB, HA or pain. orthostatics better but still +     Medication:    Medications: MEDICATIONS  (STANDING):  aspirin  chewable 81 milliGRAM(s) Oral daily  atorvastatin 80 milliGRAM(s) Oral at bedtime  chlorhexidine 2% Cloths 1 Application(s) Topical daily  dextrose 5%. 1000 milliLiter(s) (50 mL/Hr) IV Continuous <Continuous>  dextrose 5%. 1000 milliLiter(s) (100 mL/Hr) IV Continuous <Continuous>  dextrose 50% Injectable 25 Gram(s) IV Push once  dextrose 50% Injectable 12.5 Gram(s) IV Push once  dextrose 50% Injectable 25 Gram(s) IV Push once  glucagon  Injectable 1 milliGRAM(s) IntraMuscular once  heparin   Injectable 5000 Unit(s) SubCutaneous every 8 hours  influenza  Vaccine (HIGH DOSE) 0.7 milliLiter(s) IntraMuscular once  insulin lispro (ADMELOG) corrective regimen sliding scale   SubCutaneous three times a day before meals  insulin lispro (ADMELOG) corrective regimen sliding scale   SubCutaneous at bedtime  levothyroxine 50 MICROGram(s) Oral daily  melatonin 3 milliGRAM(s) Oral at bedtime  midodrine. 5 milliGRAM(s) Oral three times a day  multivitamin/minerals 1 Tablet(s) Oral daily  sodium chloride 0.9%. 1000 milliLiter(s) (75 mL/Hr) IV Continuous <Continuous>  venlafaxine XR. 75 milliGRAM(s) Oral daily    MEDICATIONS  (PRN):  dextrose Oral Gel 15 Gram(s) Oral once PRN Blood Glucose LESS THAN 70 milliGRAM(s)/deciliter       Vitals:  Vital Signs Last 24 Hrs  T(C): 36.6 (04 Mar 2024 08:11), Max: 36.8 (03 Mar 2024 11:04)  T(F): 97.8 (04 Mar 2024 08:11), Max: 98.2 (03 Mar 2024 11:04)  HR: 74 (04 Mar 2024 08:11) (61 - 74)  BP: 168/65 (04 Mar 2024 08:11) (148/69 - 171/71)  BP(mean): --  RR: 18 (04 Mar 2024 08:11) (18 - 18)  SpO2: 100% (04 Mar 2024 08:11) (96% - 100%)    Parameters below as of 04 Mar 2024 08:11  Patient On (Oxygen Delivery Method): room air    03-04-24 @ 06:20  Lying BP: Orthostatic BP (Lying Systolic): 172/Orthostatic BP (Lying Diastolic (mm Hg)): 70 HR: Orthostatic Pulse (Heart Rate (beats/min)): 84   Sitting BP: Orthostatic BP (Sitting Systolic): 163/Orthostatic BP (Sitting Diastolic (mm Hg)): 64 HR: Orthostatic Pulse (Heart Rate (beats/min)): 82  Standing BP: Orthostatic BP (Standing Systolic): 160/Orthostatic BP (Standing Diastolic (mm Hg)): 68 HR: Orthostatic Pulse (Heart Rate (beats/min)): 85  Site: Orthostatic BP/Pulse (Site): upper left arm   Mode: Orthostatic BP/ Pulse (Mode): electronic      Orthostatic VS    02-29-24 @ 04:00  Lying BP: Orthostatic BP (Lying Systolic): 169/Orthostatic BP (Lying Diastolic (mm Hg)): 70 HR: Orthostatic Pulse (Heart Rate (beats/min)): 65   Sitting BP: Orthostatic BP (Sitting Systolic): 122/Orthostatic BP (Sitting Diastolic (mm Hg)): 76 HR: Orthostatic Pulse (Heart Rate (beats/min)): 80  Standing BP: Orthostatic BP (Standing Systolic): 109/Orthostatic BP (Standing Diastolic (mm Hg)): 64 HR: Orthostatic Pulse (Heart Rate (beats/min)): 68  Site: --   Mode: --      General Exam:   Orthostatic VS    03-03-24 @ 18:03  Lying BP: Orthostatic BP (Lying Systolic): 150/Orthostatic BP (Lying Diastolic (mm Hg)): 56 HR: Orthostatic Pulse (Heart Rate (beats/min)): 84   Sitting BP: Orthostatic BP (Sitting Systolic): 140/Orthostatic BP (Sitting Diastolic (mm Hg)): 56 HR: Orthostatic Pulse (Heart Rate (beats/min)): 85  Standing BP: Orthostatic BP (Standing Systolic): 121/Orthostatic BP (Standing Diastolic (mm Hg)): 65 HR: Orthostatic Pulse (Heart Rate (beats/min)): 98  Site: Orthostatic BP/Pulse (Site): upper left arm   Mode: Orthostatic BP/ Pulse (Mode): electronic    03-03-24 @ 06:08  Lying BP: Orthostatic BP (Lying Systolic): 158/Orthostatic BP (Lying Diastolic (mm Hg)): 74 HR: --   Sitting BP: Orthostatic BP (Sitting Systolic): 148/Orthostatic BP (Sitting Diastolic (mm Hg)): 72 HR: --  Standing BP: Orthostatic BP (Standing Systolic): 136/Orthostatic BP (Standing Diastolic (mm Hg)): 71 HR: --  Site: Orthostatic BP/Pulse (Site): upper left arm   Mode: Orthostatic BP/ Pulse (Mode): electronic  General Appearance: Appropriately dressed and in no acute distress       Head: Normocephalic, atraumatic and no dysmorphic features  Ear, Nose, and Throat: Moist mucous membranes  CVS: S1S2+  Resp: No SOB, no wheeze or rhonchi  Abd: soft NTND  Extremities: No edema, no cyanosis  Skin: No bruises, no rashes     Neurological Exam:  Mental Status: Awake, alert and oriented x 2  Able to follow simple and complex verbal commands. Able to name and repeat. fluent speech. No obvious aphasia or dysarthria noted.   Cranial Nerves: PERRL, EOMI, VFFC, sensation V1-V3 intact,  no obvious facial asymmetry , equal elevation of palate, scm/trap 5/5, tongue is midline on protrusion. no obvious papilledema on fundoscopic exam. Hearing is grossly intact.   Motor: Normal bulk, tone and strength throughout. Fine finger movements were intact and symmetric. no tremors or drift noted.    Sensation: Intact to light touch and pinprick throughout. no right/left confusion. no extinction to tactile on DSS.   Reflexes: 1+ throughout at biceps, brachioradialis, triceps, patellars and ankles bilaterally and equal. No clonus. R toe and L toe were both downgoing.  Coordination: No dysmetria on FNF or HKS  Gait: deferred     I personally reviewed the below data/images/labs:      LABS:      03-04    137  |  106  |  22  ----------------------------<  81  4.3   |  22  |  1.55<H>    Ca    8.9      04 Mar 2024 06:30         Urinalysis Basic - ( 04 Mar 2024 06:30 )    Color: x / Appearance: x / SG: x / pH: x  Gluc: 81 mg/dL / Ketone: x  / Bili: x / Urobili: x   Blood: x / Protein: x / Nitrite: x   Leuk Esterase: x / RBC: x / WBC x   Sq Epi: x / Non Sq Epi: x / Bacteria: x       < from: MR Head No Cont (02.29.24 @ 12:57) >    ACC: [id number] EXAM:  MR BRAIN   ORDERED BY:  ANGEL ORTEGA     PROCEDURE DATE:  02/29/2024          INTERPRETATION:  EXAM: MRI OF THE BRAIN WITHOUT CONTRAST    HISTORY: Syncopal episode, altered mental status, acute on chronic   dementia    TECHNIQUE: Multi-planar multi-sequential MR imaging of the brain was   performed without intravenous contrast.    COMPARISON: CT/CTA of the head February 28, 2024.    FINDINGS:    Study is mildly motion degraded.    No diffusion restriction to suggest acuteinfarct. Foci of increased   T2/FLAIR signal throughout the subcortical, deep, and periventricular   white matter, compatible with chronic small vessel disease. Parenchymal   volume loss resulting in a mild ex vacuo dilatation appearance of the   bilateral ventricles. The visualized extra axial spaces and basal   cisterns are within normal limits. No midline shift or mass effect   present.    The craniocervical junction is within normal limits. The pituitary is   unremarkable. The major intracranial vessels demonstrate the expected   signal void related to vascular flow. The paranasal sinuses are well   aerated. The mastoid air cells are well aerated. The visualized orbits   are status post cataract surgery.      IMPRESSION:    1.  No evidence of acute infarct or midline shift.  2.  Chronic small vessel disease.    --- End of Report ---         MIKE EATON MD; Attending Radiologist  This document has been electronically signed. Feb 29 2024  1:47PM    < end of copied text >  < from: CT Angio Neck w/ IV Cont (02.28.24 @ 14:02) >    ACC: [id number] EXAM:  CT ANGIO BRAIN (W)AW IC   ORDERED BY: CORINA FLOWERS     ACC: [id number] EXAM:  CT ANGIO NECK (W)AW IC   ORDERED BY: CORINA FLOWERS     ACC: [id number] EXAM:  CT BRAIN   ORDERED BY: CORINA FLOWERS     PROCEDURE DATE:  02/28/2024          INTERPRETATION:  Exam Date: 2/28/2024 2:01 PM    Three examinations were performed on this patient:  1. CT Angiography of the carotid arteries with and without IV contrast  2. CT Angiography of the intracranial circulation with and without IV   contrast      CLINICAL INFORMATION:  pt has been off for the past week, worsening over   the past 24 hrs, L facial droo    TECHNIQUE:   Preceding intravenous contrast contiguous axial 4 mm   sections were obtained through the head. CT angiography images were   acquired from the aortic arch to the vertex of the skull.   Images were   acquired during rapid bolus intravenous administration of 90 mL of   Omnipaque 350 contrast with 10 mL discarded.  3D, coronal, and sagittal   reformats were obtained.    COMPARISON:     None    FINDINGS:    The brain demonstrates no acute cerebral cortical infarct is seen. No   acute hemorrhage is present. There is extensive periventricular   hypoattenuation, compatible with advanced chronic microvascular ischemic   changes. No extra-axial fluid collection. Ventricles and sulci are   prominent, compatible with generalized cerebral line loss.    The paranasal sinuses and mastoid air cells are clear.    Intracranial vertebral arteries are intact without evidence of dissection   or hemodynamically significant stenosis. The basilar artery and posterior   cerebral arteries and are normal without hemodynamically significant   stenosis. Bilateral superior cerebellar arteries are intact.    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    The intracranial internal carotid arteries is without hemodynamically   significant stenosis. The middle cerebral arteries, and anterior cerebral   arteries are intact without hemodynamically significant stenosis.  There   is no evidence of aneurysm or vascular malformation.    The carotid circulation is intact without hemodynamically significant   stenosis.   The vertebral arteries are patent.      IMPRESSION:        1.   Brain: No acute abnormalities. There is extensive   periventricular hypoattenuation, compatible with advanced chronic   microvascular ischemic changes.        2.   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  No   hemodynamically significant stenosis.        3.    Right carotid/vertebral artery system:  No hemodynamically   significant stenosis.  No evidence of dissection.        4.   Left carotid/vertebral artery system:  No hemodynamically   significant stenosis.  No evidence of dissection.    --- End of Report ---            PETER BENITEZ MD; Attending Radiologist  This document has been electronically signed. Feb 28 2024  2:16PM    < end of copied text >  
    Subjective: Patient seen and examined. No new events except as noted.     SUBJECTIVE/ROS:  nad      MEDICATIONS:  MEDICATIONS  (STANDING):  aspirin  chewable 81 milliGRAM(s) Oral daily  atorvastatin 80 milliGRAM(s) Oral at bedtime  chlorhexidine 2% Cloths 1 Application(s) Topical daily  dextrose 5%. 1000 milliLiter(s) (50 mL/Hr) IV Continuous <Continuous>  dextrose 5%. 1000 milliLiter(s) (100 mL/Hr) IV Continuous <Continuous>  dextrose 50% Injectable 25 Gram(s) IV Push once  dextrose 50% Injectable 12.5 Gram(s) IV Push once  dextrose 50% Injectable 25 Gram(s) IV Push once  glucagon  Injectable 1 milliGRAM(s) IntraMuscular once  heparin   Injectable 5000 Unit(s) SubCutaneous every 8 hours  influenza  Vaccine (HIGH DOSE) 0.7 milliLiter(s) IntraMuscular once  insulin lispro (ADMELOG) corrective regimen sliding scale   SubCutaneous at bedtime  insulin lispro (ADMELOG) corrective regimen sliding scale   SubCutaneous three times a day before meals  levothyroxine 50 MICROGram(s) Oral daily  melatonin 3 milliGRAM(s) Oral at bedtime  midodrine. 5 milliGRAM(s) Oral three times a day  multivitamin/minerals 1 Tablet(s) Oral daily  sodium chloride 0.9%. 1000 milliLiter(s) (75 mL/Hr) IV Continuous <Continuous>  venlafaxine XR. 75 milliGRAM(s) Oral daily      PHYSICAL EXAM:  T(C): 36.6 (03-04-24 @ 08:11), Max: 36.8 (03-03-24 @ 11:04)  HR: 74 (03-04-24 @ 08:11) (61 - 74)  BP: 168/65 (03-04-24 @ 08:11) (148/69 - 171/71)  RR: 18 (03-04-24 @ 08:11) (18 - 18)  SpO2: 100% (03-04-24 @ 08:11) (96% - 100%)  Wt(kg): --  I&O's Summary    03 Mar 2024 07:01  -  04 Mar 2024 07:00  --------------------------------------------------------  IN: 360 mL / OUT: 0 mL / NET: 360 mL            JVP: Normal  Neck: supple  Lung: clear   CV: S1 S2 , Murmur:  Abd: soft  Ext: No edema  neuro: Awake / alert  Psych: flat affect  Skin: normal``    LABS/DATA:    CARDIAC MARKERS:            03-04    137  |  106  |  22  ----------------------------<  81  4.3   |  22  |  1.55<H>    Ca    8.9      04 Mar 2024 06:30      proBNP:   Lipid Profile:   HgA1c:   TSH:     TELE:  EKG:        
  Dr. Skaggs  Office [phone] (9 am to 5 pm)  Service: [phone] (5pm to 9am)  Courtney HOOVER      RENAL PROGRESS NOTE: DATE OF SERVICE 03-01-24 @ 18:01    Patient is a 83y old  Female who presents with a chief complaint of cognitive changes , FTT (01 Mar 2024 16:30)      Patient seen and examined at bedside. No chest pain/sob    VITALS:  T(F): 97.9 (03-01-24 @ 11:22), Max: 97.9 (03-01-24 @ 00:00)  HR: 76 (03-01-24 @ 16:11)  BP: 136/70 (03-01-24 @ 16:11)  RR: 18 (03-01-24 @ 16:11)  SpO2: 98% (03-01-24 @ 16:11)  Wt(kg): --    02-29 @ 07:01  -  03-01 @ 07:00  --------------------------------------------------------  IN: 0 mL / OUT: 1 mL / NET: -1 mL    03-01 @ 07:01  -  03-01 @ 18:01  --------------------------------------------------------  IN: 520 mL / OUT: 0 mL / NET: 520 mL          PHYSICAL EXAM:  Constitutional: NAD  Neck: No JVD  Respiratory: CTAB, no wheezes, rales or rhonchi  Cardiovascular: S1, S2, RRR  Gastrointestinal: BS+, soft, NT/ND  Extremities: No peripheral edema    Hospital Medications:   MEDICATIONS  (STANDING):  aspirin  chewable 81 milliGRAM(s) Oral daily  atorvastatin 80 milliGRAM(s) Oral at bedtime  chlorhexidine 2% Cloths 1 Application(s) Topical daily  dextrose 5%. 1000 milliLiter(s) (50 mL/Hr) IV Continuous <Continuous>  dextrose 5%. 1000 milliLiter(s) (100 mL/Hr) IV Continuous <Continuous>  dextrose 50% Injectable 25 Gram(s) IV Push once  dextrose 50% Injectable 12.5 Gram(s) IV Push once  dextrose 50% Injectable 25 Gram(s) IV Push once  glucagon  Injectable 1 milliGRAM(s) IntraMuscular once  heparin   Injectable 5000 Unit(s) SubCutaneous every 8 hours  influenza  Vaccine (HIGH DOSE) 0.7 milliLiter(s) IntraMuscular once  insulin lispro (ADMELOG) corrective regimen sliding scale   SubCutaneous at bedtime  insulin lispro (ADMELOG) corrective regimen sliding scale   SubCutaneous three times a day before meals  levothyroxine 50 MICROGram(s) Oral daily  melatonin 3 milliGRAM(s) Oral at bedtime  midodrine. 5 milliGRAM(s) Oral <User Schedule>  multivitamin/minerals 1 Tablet(s) Oral daily  venlafaxine XR. 75 milliGRAM(s) Oral daily      LABS:  03-01    135  |  102  |  24<H>  ----------------------------<  87  4.4   |  25  |  1.40<H>    Ca    9.2      01 Mar 2024 06:18      Creatinine Trend: 1.40 <--, 1.45 <--, 1.41 <--, 1.61 <--            Urine Studies:  Urinalysis - [03-01-24 @ 06:18]      Color  / Appearance  / SG  / pH       Gluc 87 / Ketone   / Bili  / Urobili        Blood  / Protein  / Leuk Est  / Nitrite       RBC  / WBC  / Hyaline  / Gran  / Sq Epi  / Non Sq Epi  / Bacteria       Vitamin D (25OH) 24.1      [02-29-24 @ 07:51]  TSH 0.50      [02-28-24 @ 12:16]  Lipid: chol 196, TG 70, HDL 65, LDL --      [02-29-24 @ 07:51]      Syphilis Screen (Treponema Pallidum Ab) Negative      [02-29-24 @ 07:51]    RADIOLOGY & ADDITIONAL STUDIES:  
[facility] [place]   MD SAAD HERNANDEZ MD ANGELA WONG, SNEHA Engel NP    TEL:  OFFICE: [phone]  From 5pm-7am Answering Service [phone]    -- RENAL FOLLOW UP NOTE ---Date of Service 03-03-24 @ 14:36    Patient is a 83y old  Female who presents with a chief complaint of cognitive changes , FTT (03 Mar 2024 11:08)      Patient seen and examined at bedside. No chest pain/sob    VITALS:  T(F): 98.2 (03-03-24 @ 11:04), Max: 98.3 (03-02-24 @ 20:39)  HR: 70 (03-03-24 @ 11:04)  BP: 151/65 (03-03-24 @ 11:04)  RR: 18 (03-03-24 @ 11:04)  SpO2: 97% (03-03-24 @ 11:04)  Wt(kg): --    03-02 @ 07:01  -  03-03 @ 07:00  --------------------------------------------------------  IN: 460 mL / OUT: 750 mL / NET: -290 mL    03-03 @ 07:01  -  03-03 @ 14:36  --------------------------------------------------------  IN: 360 mL / OUT: 0 mL / NET: 360 mL          PHYSICAL EXAM:  General: NAD  Neck: No JVD  Respiratory: CTAB, no wheezes, rales or rhonchi  Cardiovascular: S1, S2, RRR  Gastrointestinal: BS+, soft, NT/ND  Extremities: No peripheral edema    Hospital Medications:   MEDICATIONS  (STANDING):  aspirin  chewable 81 milliGRAM(s) Oral daily  atorvastatin 80 milliGRAM(s) Oral at bedtime  chlorhexidine 2% Cloths 1 Application(s) Topical daily  dextrose 5%. 1000 milliLiter(s) (50 mL/Hr) IV Continuous <Continuous>  dextrose 5%. 1000 milliLiter(s) (100 mL/Hr) IV Continuous <Continuous>  dextrose 50% Injectable 25 Gram(s) IV Push once  dextrose 50% Injectable 12.5 Gram(s) IV Push once  dextrose 50% Injectable 25 Gram(s) IV Push once  glucagon  Injectable 1 milliGRAM(s) IntraMuscular once  heparin   Injectable 5000 Unit(s) SubCutaneous every 8 hours  influenza  Vaccine (HIGH DOSE) 0.7 milliLiter(s) IntraMuscular once  insulin lispro (ADMELOG) corrective regimen sliding scale   SubCutaneous at bedtime  insulin lispro (ADMELOG) corrective regimen sliding scale   SubCutaneous three times a day before meals  levothyroxine 50 MICROGram(s) Oral daily  melatonin 3 milliGRAM(s) Oral at bedtime  midodrine. 5 milliGRAM(s) Oral three times a day  multivitamin/minerals 1 Tablet(s) Oral daily  sodium chloride 0.9%. 1000 milliLiter(s) (75 mL/Hr) IV Continuous <Continuous>  venlafaxine XR. 75 milliGRAM(s) Oral daily      LABS:  03-03    138  |  102  |  22  ----------------------------<  115<H>  4.2   |  25  |  1.57<H>    Ca    9.6      03 Mar 2024 06:32      Creatinine Trend: 1.57 <--, 1.48 <--, 1.40 <--, 1.45 <--, 1.41 <--, 1.61 <--            Urine Studies:  Urinalysis - [03-03-24 @ 06:32]      Color  / Appearance  / SG  / pH       Gluc 115 / Ketone   / Bili  / Urobili        Blood  / Protein  / Leuk Est  / Nitrite       RBC  / WBC  / Hyaline  / Gran  / Sq Epi  / Non Sq Epi  / Bacteria     Urine Creatinine 120      [03-02-24 @ 04:52]  Urine Sodium 91      [03-02-24 @ 04:52]    Vitamin D (25OH) 24.1      [02-29-24 @ 07:51]  TSH 0.50      [02-28-24 @ 12:16]  Lipid: chol 196, TG 70, HDL 65, LDL --      [02-29-24 @ 07:51]      Syphilis Screen (Treponema Pallidum Ab) Negative      [02-29-24 @ 07:51]    RADIOLOGY & ADDITIONAL STUDIES:  
[facility] [place] [facility]   MD SAAD HERNANDEZ MD ANGELA WONG, PA Venitha Krishnan, NP    TEL:  OFFICE: [phone]  From 5pm-7am Answering Service [phone]    -- RENAL FOLLOW UP NOTE ---Date of Service 03-04-24 @ 14:23    Patient is a 83y old  Female who presents with a chief complaint of cognitive changes , FTT (04 Mar 2024 08:25)      Patient seen and examined at bedside. in no apparent distress     VITALS:  T(F): 97.6 (03-04-24 @ 11:15), Max: 98.2 (03-03-24 @ 16:47)  HR: 70 (03-04-24 @ 11:15)  BP: 137/69 (03-04-24 @ 11:15)  RR: 18 (03-04-24 @ 11:15)  SpO2: 99% (03-04-24 @ 11:15)  Wt(kg): --    03-03 @ 07:01  -  03-04 @ 07:00  --------------------------------------------------------  IN: 360 mL / OUT: 0 mL / NET: 360 mL    03-04 @ 07:01  -  03-04 @ 14:23  --------------------------------------------------------  IN: 240 mL / OUT: 0 mL / NET: 240 mL          PHYSICAL EXAM:  General: NAD  Neck: No JVD  Respiratory: CTAB, no wheezes, rales or rhonchi  Cardiovascular: S1, S2, RRR  Gastrointestinal: BS+, soft, NT/ND  Extremities: No peripheral edema    Hospital Medications:   MEDICATIONS  (STANDING):  aspirin  chewable 81 milliGRAM(s) Oral daily  atorvastatin 80 milliGRAM(s) Oral at bedtime  chlorhexidine 2% Cloths 1 Application(s) Topical daily  dextrose 5%. 1000 milliLiter(s) (50 mL/Hr) IV Continuous <Continuous>  dextrose 5%. 1000 milliLiter(s) (100 mL/Hr) IV Continuous <Continuous>  dextrose 50% Injectable 25 Gram(s) IV Push once  dextrose 50% Injectable 12.5 Gram(s) IV Push once  dextrose 50% Injectable 25 Gram(s) IV Push once  glucagon  Injectable 1 milliGRAM(s) IntraMuscular once  heparin   Injectable 5000 Unit(s) SubCutaneous every 8 hours  influenza  Vaccine (HIGH DOSE) 0.7 milliLiter(s) IntraMuscular once  insulin lispro (ADMELOG) corrective regimen sliding scale   SubCutaneous at bedtime  insulin lispro (ADMELOG) corrective regimen sliding scale   SubCutaneous three times a day before meals  levothyroxine 50 MICROGram(s) Oral daily  melatonin 3 milliGRAM(s) Oral at bedtime  midodrine. 5 milliGRAM(s) Oral three times a day  multivitamin/minerals 1 Tablet(s) Oral daily  sodium chloride 0.9%. 1000 milliLiter(s) (75 mL/Hr) IV Continuous <Continuous>  venlafaxine XR. 75 milliGRAM(s) Oral daily      LABS:  03-04    137  |  106  |  22  ----------------------------<  81  4.3   |  22  |  1.55<H>    Ca    8.9      04 Mar 2024 06:30      Creatinine Trend: 1.55 <--, 1.57 <--, 1.48 <--, 1.40 <--, 1.45 <--, 1.41 <--, 1.61 <--            Urine Studies:  Urinalysis - [03-04-24 @ 06:30]      Color  / Appearance  / SG  / pH       Gluc 81 / Ketone   / Bili  / Urobili        Blood  / Protein  / Leuk Est  / Nitrite       RBC  / WBC  / Hyaline  / Gran  / Sq Epi  / Non Sq Epi  / Bacteria     Urine Creatinine 157      [03-03-24 @ 15:00]  Urine Sodium 73      [03-03-24 @ 15:00]    Vitamin D (25OH) 24.1      [02-29-24 @ 07:51]  TSH 0.50      [02-28-24 @ 12:16]  Lipid: chol 196, TG 70, HDL 65, LDL --      [02-29-24 @ 07:51]      Syphilis Screen (Treponema Pallidum Ab) Negative      [02-29-24 @ 07:51]    RADIOLOGY & ADDITIONAL STUDIES:  
Patient is a 83y old  Female who presents with a chief complaint of cognitive changes , FTT (28 Feb 2024 21:20)    Date of servie : 02-29-24 @ 14:36  INTERVAL HPI/OVERNIGHT EVENTS:  T(C): 36.4 (02-29-24 @ 13:21), Max: 37.1 (02-29-24 @ 04:00)  HR: 70 (02-29-24 @ 13:21) (65 - 95)  BP: 165/61 (02-29-24 @ 13:21) (118/65 - 169/70)  RR: 17 (02-29-24 @ 13:21) (17 - 18)  SpO2: 100% (02-29-24 @ 13:21) (96% - 100%)  Wt(kg): --  I&O's Summary      LABS:                        10.7   9.09  )-----------( 271      ( 28 Feb 2024 12:16 )             33.1     02-28    134<L>  |  100  |  25<H>  ----------------------------<  120<H>  4.9   |  21<L>  |  1.41<H>    Ca    9.4      28 Feb 2024 16:23  Phos  3.8     02-28  Mg     2.2     02-28    TPro  7.8  /  Alb  4.3  /  TBili  0.4  /  DBili  x   /  AST  19  /  ALT  6<L>  /  AlkPhos  64  02-28      Urinalysis Basic - ( 28 Feb 2024 16:23 )    Color: x / Appearance: x / SG: x / pH: x  Gluc: 120 mg/dL / Ketone: x  / Bili: x / Urobili: x   Blood: x / Protein: x / Nitrite: x   Leuk Esterase: x / RBC: x / WBC x   Sq Epi: x / Non Sq Epi: x / Bacteria: x      CAPILLARY BLOOD GLUCOSE      POCT Blood Glucose.: 84 mg/dL (29 Feb 2024 11:18)  POCT Blood Glucose.: 80 mg/dL (29 Feb 2024 07:46)  POCT Blood Glucose.: 122 mg/dL (29 Feb 2024 04:00)  POCT Blood Glucose.: 84 mg/dL (29 Feb 2024 03:00)  POCT Blood Glucose.: 104 mg/dL (28 Feb 2024 22:49)  POCT Blood Glucose.: 64 mg/dL (28 Feb 2024 22:12)  POCT Blood Glucose.: 112 mg/dL (28 Feb 2024 16:10)  POCT Blood Glucose.: 62 mg/dL (28 Feb 2024 15:23)        Urinalysis Basic - ( 28 Feb 2024 16:23 )    Color: x / Appearance: x / SG: x / pH: x  Gluc: 120 mg/dL / Ketone: x  / Bili: x / Urobili: x   Blood: x / Protein: x / Nitrite: x   Leuk Esterase: x / RBC: x / WBC x   Sq Epi: x / Non Sq Epi: x / Bacteria: x        MEDICATIONS  (STANDING):  aspirin  chewable 81 milliGRAM(s) Oral daily  atorvastatin 80 milliGRAM(s) Oral at bedtime  dextrose 5%. 1000 milliLiter(s) (50 mL/Hr) IV Continuous <Continuous>  dextrose 5%. 1000 milliLiter(s) (100 mL/Hr) IV Continuous <Continuous>  dextrose 50% Injectable 25 Gram(s) IV Push once  dextrose 50% Injectable 12.5 Gram(s) IV Push once  dextrose 50% Injectable 25 Gram(s) IV Push once  glucagon  Injectable 1 milliGRAM(s) IntraMuscular once  heparin   Injectable 5000 Unit(s) SubCutaneous every 8 hours  insulin lispro (ADMELOG) corrective regimen sliding scale   SubCutaneous at bedtime  insulin lispro (ADMELOG) corrective regimen sliding scale   SubCutaneous three times a day before meals  levothyroxine 50 MICROGram(s) Oral daily  venlafaxine XR. 75 milliGRAM(s) Oral daily    MEDICATIONS  (PRN):  dextrose Oral Gel 15 Gram(s) Oral once PRN Blood Glucose LESS THAN 70 milliGRAM(s)/deciliter          PHYSICAL EXAM:  GENERAL: NAD, well-groomed, well-developed  HEAD:  Atraumatic, Normocephalic  CHEST/LUNG: Clear to percussion bilaterally; No rales, rhonchi, wheezing, or rubs  HEART: Regular rate and rhythm; No murmurs, rubs, or gallops  ABDOMEN: Soft, Nontender, Nondistended; Bowel sounds present  EXTREMITIES:  2+ Peripheral Pulses, No clubbing, cyanosis, or edema  LYMPH: No lymphadenopathy noted  SKIN: No rashes or lesions    Care Discussed with Consultants/Other Providers [ ] YES  [ ] NO
[facility] – [place] [facility]   MD SAAD HERNANDEZ MD MARIA SANTIAGO, NP    TEL:  OFFICE: [phone]  From 5pm-7am Answering Service [phone]    -- RENAL FOLLOW UP NOTE ---Date of Service 02-29-24 @ 15:14    Patient is a 83y old  Female who presents with a chief complaint of cognitive changes , FTT (29 Feb 2024 14:36)      Patient seen and examined at bedside. No chest pain/sob    VITALS:  T(F): 97.6 (02-29-24 @ 13:21), Max: 98.8 (02-29-24 @ 04:00)  HR: 70 (02-29-24 @ 13:21)  BP: 165/61 (02-29-24 @ 13:21)  RR: 17 (02-29-24 @ 13:21)  SpO2: 100% (02-29-24 @ 13:21)  Wt(kg): --        PHYSICAL EXAM:  General: NAD  Neck: No JVD  Respiratory: CTAB, no wheezes, rales or rhonchi  Cardiovascular: S1, S2, RRR  Gastrointestinal: BS+, soft, NT/ND  Extremities: No peripheral edema    Hospital Medications:   MEDICATIONS  (STANDING):  aspirin  chewable 81 milliGRAM(s) Oral daily  atorvastatin 80 milliGRAM(s) Oral at bedtime  dextrose 5%. 1000 milliLiter(s) (50 mL/Hr) IV Continuous <Continuous>  dextrose 5%. 1000 milliLiter(s) (100 mL/Hr) IV Continuous <Continuous>  dextrose 50% Injectable 25 Gram(s) IV Push once  dextrose 50% Injectable 12.5 Gram(s) IV Push once  dextrose 50% Injectable 25 Gram(s) IV Push once  glucagon  Injectable 1 milliGRAM(s) IntraMuscular once  heparin   Injectable 5000 Unit(s) SubCutaneous every 8 hours  insulin lispro (ADMELOG) corrective regimen sliding scale   SubCutaneous at bedtime  insulin lispro (ADMELOG) corrective regimen sliding scale   SubCutaneous three times a day before meals  levothyroxine 50 MICROGram(s) Oral daily  venlafaxine XR. 75 milliGRAM(s) Oral daily      LABS:  02-28    134<L>  |  100  |  25<H>  ----------------------------<  120<H>  4.9   |  21<L>  |  1.41<H>    Ca    9.4      28 Feb 2024 16:23  Phos  3.8     02-28  Mg     2.2     02-28    TPro  7.8  /  Alb  4.3  /  TBili  0.4  /  DBili      /  AST  19  /  ALT  6<L>  /  AlkPhos  64  02-28    Creatinine Trend: 1.41 <--, 1.61 <--    Vitamin D, 25-Hydroxy: 24.1 ng/mL (02-29 @ 07:51)                              10.7   9.09  )-----------( 271      ( 28 Feb 2024 12:16 )             33.1     Urine Studies:  Urinalysis - [02-28-24 @ 16:23]      Color  / Appearance  / SG  / pH       Gluc 120 / Ketone   / Bili  / Urobili        Blood  / Protein  / Leuk Est  / Nitrite       RBC  / WBC  / Hyaline  / Gran  / Sq Epi  / Non Sq Epi  / Bacteria       Vitamin D (25OH) 24.1      [02-29-24 @ 07:51]  TSH 0.50      [02-28-24 @ 12:16]  Lipid: chol 196, TG 70, HDL 65, LDL --      [02-29-24 @ 07:51]        RADIOLOGY & ADDITIONAL STUDIES:  
Patient is a 83y old  Female who presents with a chief complaint of cognitive changes , FTT (02 Mar 2024 11:56)    Date of servie : 03-02-24 @ 12:38  INTERVAL HPI/OVERNIGHT EVENTS:  T(C): 36.7 (03-02-24 @ 11:59), Max: 36.7 (03-02-24 @ 00:11)  HR: 71 (03-02-24 @ 11:59) (64 - 76)  BP: 159/71 (03-02-24 @ 11:59) (136/70 - 170/73)  RR: 18 (03-02-24 @ 11:59) (18 - 18)  SpO2: 97% (03-02-24 @ 11:59) (97% - 100%)  Wt(kg): --  I&O's Summary    01 Mar 2024 07:01  -  02 Mar 2024 07:00  --------------------------------------------------------  IN: 520 mL / OUT: 0 mL / NET: 520 mL    02 Mar 2024 07:01  -  02 Mar 2024 12:38  --------------------------------------------------------  IN: 460 mL / OUT: 0 mL / NET: 460 mL        LABS:    03-02    137  |  102  |  24<H>  ----------------------------<  114<H>  4.6   |  21<L>  |  1.48<H>    Ca    9.4      02 Mar 2024 10:30        Urinalysis Basic - ( 02 Mar 2024 10:30 )    Color: x / Appearance: x / SG: x / pH: x  Gluc: 114 mg/dL / Ketone: x  / Bili: x / Urobili: x   Blood: x / Protein: x / Nitrite: x   Leuk Esterase: x / RBC: x / WBC x   Sq Epi: x / Non Sq Epi: x / Bacteria: x      CAPILLARY BLOOD GLUCOSE      POCT Blood Glucose.: 95 mg/dL (02 Mar 2024 11:33)  POCT Blood Glucose.: 100 mg/dL (02 Mar 2024 07:26)  POCT Blood Glucose.: 118 mg/dL (01 Mar 2024 22:12)  POCT Blood Glucose.: 79 mg/dL (01 Mar 2024 21:31)  POCT Blood Glucose.: 97 mg/dL (01 Mar 2024 17:29)        Urinalysis Basic - ( 02 Mar 2024 10:30 )    Color: x / Appearance: x / SG: x / pH: x  Gluc: 114 mg/dL / Ketone: x  / Bili: x / Urobili: x   Blood: x / Protein: x / Nitrite: x   Leuk Esterase: x / RBC: x / WBC x   Sq Epi: x / Non Sq Epi: x / Bacteria: x        MEDICATIONS  (STANDING):  aspirin  chewable 81 milliGRAM(s) Oral daily  atorvastatin 80 milliGRAM(s) Oral at bedtime  chlorhexidine 2% Cloths 1 Application(s) Topical daily  dextrose 5%. 1000 milliLiter(s) (50 mL/Hr) IV Continuous <Continuous>  dextrose 5%. 1000 milliLiter(s) (100 mL/Hr) IV Continuous <Continuous>  dextrose 50% Injectable 25 Gram(s) IV Push once  dextrose 50% Injectable 12.5 Gram(s) IV Push once  dextrose 50% Injectable 25 Gram(s) IV Push once  glucagon  Injectable 1 milliGRAM(s) IntraMuscular once  heparin   Injectable 5000 Unit(s) SubCutaneous every 8 hours  influenza  Vaccine (HIGH DOSE) 0.7 milliLiter(s) IntraMuscular once  insulin lispro (ADMELOG) corrective regimen sliding scale   SubCutaneous three times a day before meals  insulin lispro (ADMELOG) corrective regimen sliding scale   SubCutaneous at bedtime  levothyroxine 50 MICROGram(s) Oral daily  melatonin 3 milliGRAM(s) Oral at bedtime  midodrine. 5 milliGRAM(s) Oral three times a day  multivitamin/minerals 1 Tablet(s) Oral daily  venlafaxine XR. 75 milliGRAM(s) Oral daily    MEDICATIONS  (PRN):  dextrose Oral Gel 15 Gram(s) Oral once PRN Blood Glucose LESS THAN 70 milliGRAM(s)/deciliter          PHYSICAL EXAM:  GENERAL: NAD, well-groomed, well-developed  HEAD:  Atraumatic, Normocephalic  CHEST/LUNG: Clear to percussion bilaterally; No rales, rhonchi, wheezing, or rubs  HEART: Regular rate and rhythm; No murmurs, rubs, or gallops  ABDOMEN: Soft, Nontender, Nondistended; Bowel sounds present  EXTREMITIES:  2+ Peripheral Pulses, No clubbing, cyanosis, or edema  LYMPH: No lymphadenopathy noted  SKIN: No rashes or lesions    Care Discussed with Consultants/Other Providers [ ] YES  [ ] NO
Patient is a 83y old  Female who presents with a chief complaint of cognitive changes , FTT (03 Mar 2024 08:51)    Date of servie : 03-03-24 @ 11:09  INTERVAL HPI/OVERNIGHT EVENTS:  T(C): 36.8 (03-03-24 @ 11:04), Max: 36.8 (03-02-24 @ 20:39)  HR: 70 (03-03-24 @ 11:04) (68 - 74)  BP: 151/65 (03-03-24 @ 11:04) (147/76 - 159/76)  RR: 18 (03-03-24 @ 11:04) (18 - 18)  SpO2: 97% (03-03-24 @ 11:04) (97% - 98%)  Wt(kg): --  I&O's Summary    02 Mar 2024 07:01  -  03 Mar 2024 07:00  --------------------------------------------------------  IN: 460 mL / OUT: 750 mL / NET: -290 mL    03 Mar 2024 07:01  -  03 Mar 2024 11:09  --------------------------------------------------------  IN: 180 mL / OUT: 0 mL / NET: 180 mL        LABS:    03-03    138  |  102  |  22  ----------------------------<  115<H>  4.2   |  25  |  1.57<H>    Ca    9.6      03 Mar 2024 06:32        Urinalysis Basic - ( 03 Mar 2024 06:32 )    Color: x / Appearance: x / SG: x / pH: x  Gluc: 115 mg/dL / Ketone: x  / Bili: x / Urobili: x   Blood: x / Protein: x / Nitrite: x   Leuk Esterase: x / RBC: x / WBC x   Sq Epi: x / Non Sq Epi: x / Bacteria: x      CAPILLARY BLOOD GLUCOSE      POCT Blood Glucose.: 118 mg/dL (03 Mar 2024 08:02)  POCT Blood Glucose.: 111 mg/dL (02 Mar 2024 21:08)  POCT Blood Glucose.: 82 mg/dL (02 Mar 2024 17:06)  POCT Blood Glucose.: 95 mg/dL (02 Mar 2024 11:33)        Urinalysis Basic - ( 03 Mar 2024 06:32 )    Color: x / Appearance: x / SG: x / pH: x  Gluc: 115 mg/dL / Ketone: x  / Bili: x / Urobili: x   Blood: x / Protein: x / Nitrite: x   Leuk Esterase: x / RBC: x / WBC x   Sq Epi: x / Non Sq Epi: x / Bacteria: x        MEDICATIONS  (STANDING):  aspirin  chewable 81 milliGRAM(s) Oral daily  atorvastatin 80 milliGRAM(s) Oral at bedtime  chlorhexidine 2% Cloths 1 Application(s) Topical daily  dextrose 5%. 1000 milliLiter(s) (50 mL/Hr) IV Continuous <Continuous>  dextrose 5%. 1000 milliLiter(s) (100 mL/Hr) IV Continuous <Continuous>  dextrose 50% Injectable 25 Gram(s) IV Push once  dextrose 50% Injectable 12.5 Gram(s) IV Push once  dextrose 50% Injectable 25 Gram(s) IV Push once  glucagon  Injectable 1 milliGRAM(s) IntraMuscular once  heparin   Injectable 5000 Unit(s) SubCutaneous every 8 hours  influenza  Vaccine (HIGH DOSE) 0.7 milliLiter(s) IntraMuscular once  insulin lispro (ADMELOG) corrective regimen sliding scale   SubCutaneous three times a day before meals  insulin lispro (ADMELOG) corrective regimen sliding scale   SubCutaneous at bedtime  levothyroxine 50 MICROGram(s) Oral daily  melatonin 3 milliGRAM(s) Oral at bedtime  midodrine. 5 milliGRAM(s) Oral three times a day  multivitamin/minerals 1 Tablet(s) Oral daily  sodium chloride 0.9%. 1000 milliLiter(s) (75 mL/Hr) IV Continuous <Continuous>  venlafaxine XR. 75 milliGRAM(s) Oral daily    MEDICATIONS  (PRN):  dextrose Oral Gel 15 Gram(s) Oral once PRN Blood Glucose LESS THAN 70 milliGRAM(s)/deciliter          PHYSICAL EXAM:  GENERAL: NAD, well-groomed, well-developed  HEAD:  Atraumatic, Normocephalic  CHEST/LUNG: Clear to percussion bilaterally; No rales, rhonchi, wheezing, or rubs  HEART: Regular rate and rhythm; No murmurs, rubs, or gallops  ABDOMEN: Soft, Nontender, Nondistended; Bowel sounds present  EXTREMITIES:  2+ Peripheral Pulses, No clubbing, cyanosis, or edema  LYMPH: No lymphadenopathy noted  SKIN: No rashes or lesions    Care Discussed with Consultants/Other Providers [ ] YES  [ ] NO
Patient is a 83y old  Female who presents with a chief complaint of cognitive changes , FTT (29 Feb 2024 15:14)    Date of servie : 03-01-24 @ 14:00  INTERVAL HPI/OVERNIGHT EVENTS:  T(C): 36.6 (03-01-24 @ 11:22), Max: 36.6 (03-01-24 @ 00:00)  HR: 73 (03-01-24 @ 11:22) (69 - 87)  BP: 145/69 (03-01-24 @ 11:22) (132/64 - 160/72)  RR: 18 (03-01-24 @ 11:22) (17 - 18)  SpO2: 100% (03-01-24 @ 11:22) (97% - 100%)  Wt(kg): --  I&O's Summary    29 Feb 2024 07:01  -  01 Mar 2024 07:00  --------------------------------------------------------  IN: 0 mL / OUT: 1 mL / NET: -1 mL    01 Mar 2024 07:01  -  01 Mar 2024 14:00  --------------------------------------------------------  IN: 520 mL / OUT: 0 mL / NET: 520 mL        LABS:    03-01    135  |  102  |  24<H>  ----------------------------<  87  4.4   |  25  |  1.40<H>    Ca    9.2      01 Mar 2024 06:18        Urinalysis Basic - ( 01 Mar 2024 06:18 )    Color: x / Appearance: x / SG: x / pH: x  Gluc: 87 mg/dL / Ketone: x  / Bili: x / Urobili: x   Blood: x / Protein: x / Nitrite: x   Leuk Esterase: x / RBC: x / WBC x   Sq Epi: x / Non Sq Epi: x / Bacteria: x      CAPILLARY BLOOD GLUCOSE      POCT Blood Glucose.: 133 mg/dL (01 Mar 2024 11:49)  POCT Blood Glucose.: 136 mg/dL (29 Feb 2024 22:04)  POCT Blood Glucose.: 82 mg/dL (29 Feb 2024 21:34)  POCT Blood Glucose.: 138 mg/dL (29 Feb 2024 17:13)        Urinalysis Basic - ( 01 Mar 2024 06:18 )    Color: x / Appearance: x / SG: x / pH: x  Gluc: 87 mg/dL / Ketone: x  / Bili: x / Urobili: x   Blood: x / Protein: x / Nitrite: x   Leuk Esterase: x / RBC: x / WBC x   Sq Epi: x / Non Sq Epi: x / Bacteria: x        MEDICATIONS  (STANDING):  aspirin  chewable 81 milliGRAM(s) Oral daily  atorvastatin 80 milliGRAM(s) Oral at bedtime  chlorhexidine 2% Cloths 1 Application(s) Topical daily  dextrose 5%. 1000 milliLiter(s) (50 mL/Hr) IV Continuous <Continuous>  dextrose 5%. 1000 milliLiter(s) (100 mL/Hr) IV Continuous <Continuous>  dextrose 50% Injectable 25 Gram(s) IV Push once  dextrose 50% Injectable 12.5 Gram(s) IV Push once  dextrose 50% Injectable 25 Gram(s) IV Push once  glucagon  Injectable 1 milliGRAM(s) IntraMuscular once  heparin   Injectable 5000 Unit(s) SubCutaneous every 8 hours  influenza  Vaccine (HIGH DOSE) 0.7 milliLiter(s) IntraMuscular once  insulin lispro (ADMELOG) corrective regimen sliding scale   SubCutaneous three times a day before meals  insulin lispro (ADMELOG) corrective regimen sliding scale   SubCutaneous at bedtime  levothyroxine 50 MICROGram(s) Oral daily  melatonin 3 milliGRAM(s) Oral at bedtime  venlafaxine XR. 75 milliGRAM(s) Oral daily    MEDICATIONS  (PRN):  dextrose Oral Gel 15 Gram(s) Oral once PRN Blood Glucose LESS THAN 70 milliGRAM(s)/deciliter          PHYSICAL EXAM:  GENERAL: NAD, well-groomed, well-developed  HEAD:  Atraumatic, Normocephalic  CHEST/LUNG: Clear to percussion bilaterally; No rales, rhonchi, wheezing, or rubs  HEART: Regular rate and rhythm; No murmurs, rubs, or gallops  ABDOMEN: Soft, Nontender, Nondistended; Bowel sounds present  EXTREMITIES:  2+ Peripheral Pulses, No clubbing, cyanosis, or edema  LYMPH: No lymphadenopathy noted  SKIN: No rashes or lesions    Care Discussed with Consultants/Other Providers [ ] YES  [ ] NO
Patient is a 83y old  Female who presents with a chief complaint of cognitive changes , FTT (01 Mar 2024 15:39)    Date of servie : 03-01-24 @ 16:31  INTERVAL HPI/OVERNIGHT EVENTS:  T(C): 36.6 (03-01-24 @ 11:22), Max: 36.6 (03-01-24 @ 00:00)  HR: 76 (03-01-24 @ 16:11) (69 - 87)  BP: 136/70 (03-01-24 @ 16:11) (132/64 - 160/72)  RR: 18 (03-01-24 @ 16:11) (17 - 18)  SpO2: 98% (03-01-24 @ 16:11) (97% - 100%)  Wt(kg): --  I&O's Summary    29 Feb 2024 07:01  -  01 Mar 2024 07:00  --------------------------------------------------------  IN: 0 mL / OUT: 1 mL / NET: -1 mL    01 Mar 2024 07:01  -  01 Mar 2024 16:31  --------------------------------------------------------  IN: 520 mL / OUT: 0 mL / NET: 520 mL        LABS:    03-01    135  |  102  |  24<H>  ----------------------------<  87  4.4   |  25  |  1.40<H>    Ca    9.2      01 Mar 2024 06:18        Urinalysis Basic - ( 01 Mar 2024 06:18 )    Color: x / Appearance: x / SG: x / pH: x  Gluc: 87 mg/dL / Ketone: x  / Bili: x / Urobili: x   Blood: x / Protein: x / Nitrite: x   Leuk Esterase: x / RBC: x / WBC x   Sq Epi: x / Non Sq Epi: x / Bacteria: x      CAPILLARY BLOOD GLUCOSE      POCT Blood Glucose.: 133 mg/dL (01 Mar 2024 11:49)  POCT Blood Glucose.: 136 mg/dL (29 Feb 2024 22:04)  POCT Blood Glucose.: 82 mg/dL (29 Feb 2024 21:34)  POCT Blood Glucose.: 138 mg/dL (29 Feb 2024 17:13)        Urinalysis Basic - ( 01 Mar 2024 06:18 )    Color: x / Appearance: x / SG: x / pH: x  Gluc: 87 mg/dL / Ketone: x  / Bili: x / Urobili: x   Blood: x / Protein: x / Nitrite: x   Leuk Esterase: x / RBC: x / WBC x   Sq Epi: x / Non Sq Epi: x / Bacteria: x        MEDICATIONS  (STANDING):  aspirin  chewable 81 milliGRAM(s) Oral daily  atorvastatin 80 milliGRAM(s) Oral at bedtime  chlorhexidine 2% Cloths 1 Application(s) Topical daily  dextrose 5%. 1000 milliLiter(s) (50 mL/Hr) IV Continuous <Continuous>  dextrose 5%. 1000 milliLiter(s) (100 mL/Hr) IV Continuous <Continuous>  dextrose 50% Injectable 25 Gram(s) IV Push once  dextrose 50% Injectable 12.5 Gram(s) IV Push once  dextrose 50% Injectable 25 Gram(s) IV Push once  glucagon  Injectable 1 milliGRAM(s) IntraMuscular once  heparin   Injectable 5000 Unit(s) SubCutaneous every 8 hours  influenza  Vaccine (HIGH DOSE) 0.7 milliLiter(s) IntraMuscular once  insulin lispro (ADMELOG) corrective regimen sliding scale   SubCutaneous at bedtime  insulin lispro (ADMELOG) corrective regimen sliding scale   SubCutaneous three times a day before meals  levothyroxine 50 MICROGram(s) Oral daily  melatonin 3 milliGRAM(s) Oral at bedtime  venlafaxine XR. 75 milliGRAM(s) Oral daily    MEDICATIONS  (PRN):  dextrose Oral Gel 15 Gram(s) Oral once PRN Blood Glucose LESS THAN 70 milliGRAM(s)/deciliter          PHYSICAL EXAM:  GENERAL: NAD, well-groomed, well-developed  HEAD:  Atraumatic, Normocephalic  CHEST/LUNG: Clear to percussion bilaterally; No rales, rhonchi, wheezing, or rubs  HEART: Regular rate and rhythm; No murmurs, rubs, or gallops  ABDOMEN: Soft, Nontender, Nondistended; Bowel sounds present  EXTREMITIES:  2+ Peripheral Pulses, No clubbing, cyanosis, or edema  LYMPH: No lymphadenopathy noted  SKIN: No rashes or lesions    Care Discussed with Consultants/Other Providers [ ] YES  [ ] NO

## 2024-03-04 NOTE — PROGRESS NOTE ADULT - ASSESSMENT
83 yr  woman with dementia (aa0x2 baseline), hypothyroidism, HTN, DM presented to the ED for acute on chronic cognitive changes and aphasia and fall. Patient accompanied by HCP/caretaker who aided in history. As per HCP, she received a call from the patient  stating "I passed out". Patient was found on her couch with her eyes closed, awake but more confused compared to baseline. Patient stated that she went to the kitchen to test her sugar and began feeling dizzy and "passed out" however never lost consciousness. No shaking noted during the episode, denied HA, numbness, acute weakness, vision changes. Patient complained of transient left eye pain and malaise.  HCP noted transient left sided facial droop.  no tongue bite  no convulsions   no post ictal confusion   A1c 5.9   b12 332   TSH WNL. 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 EF 67%   CTH no acute findings. chronic microvascular changes   CTA H/N with R ICA 4.9x3.8mm aneurysms with wide neck 3mm   CD neg   MRI brain with no acute infarct noted. chronic small vessel changes   orthostatics + [id number]--> 109/64 standing  o/e AAOx2, FELIPE no focal findings. no facial   3/3 orthostatics better but still _+ --> 158/74-->130/71  3/4 orthostatics improved   Impression:   1) AMS and dementia, near baseline   2) syncope likely 2/2 orthostasis   3) R ICA aneurysm 4.9 x 3.8 mm, with a wide neck of approximately 3.0 mm    - dc planning pending orthostatics ; wants to go home ; orthostatics 3/4 bvetter   - c/w asa and statin therpay   - b12 332 low normal, would supplement   - neurosx called, no acute intervention   - may benefit form midodrine started 5 TID>  titrate up to 10 TID if tolerated   - PT/OT, rehab suggested but patient refusing   - check FS, glucose control <180  - GI/DVT ppx   - Thank you for allowing me to participate in the care of this patient. Call with questions.   spoke with HCP  Alo Zuñiga MD  Vascular Neurology  Office: [phone]

## 2024-03-04 NOTE — PROGRESS NOTE ADULT - ASSESSMENT
83 yr woman with a PMHx significant for dementia (aa0x2 baseline), hypothyroidism, HTN, DM presented to the ED for acute on chronic cognitive changes and aphasia. Admitted for FTT.  Nephrology consulted for PARVEEN.    A/P:  PARVEEN:  Etiology?  FeNa 0.8% - suggests pre-renal.  Renal US - no hydro. There are left renal cysts, including a cyst with thin septation in   the lower pole  measuring up to 0.6 cm. --> outpt f/u  s/p RL 1L  SCr remains elevated at 1.5  repeat Fena 0.5 % --> suggests pre-renal   c/w NS @75cc/hr   Monitor renal function closely    Orthostatic/HTN:  BP fluctuates.  Currently on midodrine 5mg TID for orthostatic hypotension   Low salt diet.  Monitor BP.

## 2024-03-04 NOTE — PROGRESS NOTE ADULT - NS ATTEND AMEND GEN_ALL_CORE FT
as above
monitor Scr closely
Scr worsened-work up suggest pre-renal  IVF as suggested  monitor Scr closely
Scr worsened  clinically dry  IVF 75cc/hr for 1 day

## 2024-03-04 NOTE — PROGRESS NOTE ADULT - PROVIDER SPECIALTY LIST ADULT
Cardiology
Nephrology
Cardiology
Hospitalist
Nephrology
Hospitalist
Neurology
Hospitalist
Nephrology
Neurology
Neurology

## 2024-03-04 NOTE — PROGRESS NOTE ADULT - REASON FOR ADMISSION
cognitive changes , FTT

## 2024-03-05 LAB
AMPHET UR-MCNC: NEGATIVE NG/ML — SIGNIFICANT CHANGE UP
BARBITURATES UR QL SCN: NEGATIVE NG/ML — SIGNIFICANT CHANGE UP
BARBITURATES UR-MCNC: NEGATIVE NG/ML — SIGNIFICANT CHANGE UP
BENZODIAZ UR-MCNC: NEGATIVE NG/ML — SIGNIFICANT CHANGE UP
COCAINE METAB.OTHER UR-MCNC: NEGATIVE NG/ML — SIGNIFICANT CHANGE UP
CREATININE, URINE THERAPEUTIC: 108.8 MG/DL — SIGNIFICANT CHANGE UP (ref 20–300)
FENTANYL RESULT, UR: NEGATIVE NG/ML — SIGNIFICANT CHANGE UP
FENTANYL UR QL SCN: NEGATIVE NG/ML — SIGNIFICANT CHANGE UP
Lab: SIGNIFICANT CHANGE UP
METHADONE UR-MCNC: NEGATIVE NG/ML — SIGNIFICANT CHANGE UP
OPIATES UR-MCNC: NEGATIVE NG/ML — SIGNIFICANT CHANGE UP
OXYCODONE UR QL SCN: NEGATIVE NG/ML — SIGNIFICANT CHANGE UP
PCP UR-MCNC: NEGATIVE NG/ML — SIGNIFICANT CHANGE UP
PH, URINE RESULT: 5.6 — SIGNIFICANT CHANGE UP (ref 4.5–8.9)
PYRIDOXAL PHOS SERPL-MCNC: 5.1 UG/L — SIGNIFICANT CHANGE UP (ref 3.4–65.2)
THC UR QL: NEGATIVE NG/ML — SIGNIFICANT CHANGE UP
VIT B1 SERPL-MCNC: 70 NMOL/L — SIGNIFICANT CHANGE UP (ref 66.5–200)

## 2024-03-07 ENCOUNTER — NON-APPOINTMENT (OUTPATIENT)
Age: 84
End: 2024-03-07

## 2024-03-13 ENCOUNTER — APPOINTMENT (OUTPATIENT)
Dept: PULMONOLOGY | Facility: CLINIC | Age: 84
End: 2024-03-13

## 2024-03-31 ENCOUNTER — INPATIENT (INPATIENT)
Facility: HOSPITAL | Age: 84
LOS: 8 days | Discharge: NURSING, REHAB OR LONG-TERM CARE | DRG: 312 | End: 2024-04-09
Attending: STUDENT IN AN ORGANIZED HEALTH CARE EDUCATION/TRAINING PROGRAM | Admitting: STUDENT IN AN ORGANIZED HEALTH CARE EDUCATION/TRAINING PROGRAM
Payer: MEDICARE

## 2024-03-31 VITALS
WEIGHT: 100.09 LBS | TEMPERATURE: 98 F | RESPIRATION RATE: 20 BRPM | HEART RATE: 84 BPM | OXYGEN SATURATION: 98 % | DIASTOLIC BLOOD PRESSURE: 79 MMHG | SYSTOLIC BLOOD PRESSURE: 119 MMHG

## 2024-03-31 DIAGNOSIS — R55 SYNCOPE AND COLLAPSE: ICD-10-CM

## 2024-03-31 PROBLEM — E11.9 TYPE 2 DIABETES MELLITUS WITHOUT COMPLICATIONS: Chronic | Status: ACTIVE | Noted: 2024-02-28

## 2024-03-31 LAB
ALBUMIN SERPL ELPH-MCNC: 4.3 G/DL — SIGNIFICANT CHANGE UP (ref 3.3–5)
ALP SERPL-CCNC: 72 U/L — SIGNIFICANT CHANGE UP (ref 40–120)
ALT FLD-CCNC: 16 U/L — SIGNIFICANT CHANGE UP (ref 10–45)
ANION GAP SERPL CALC-SCNC: 13 MMOL/L — SIGNIFICANT CHANGE UP (ref 5–17)
ANION GAP SERPL CALC-SCNC: 16 MMOL/L — SIGNIFICANT CHANGE UP (ref 5–17)
APPEARANCE UR: CLEAR — SIGNIFICANT CHANGE UP
APTT BLD: 27.4 SEC — SIGNIFICANT CHANGE UP (ref 24.5–35.6)
AST SERPL-CCNC: 40 U/L — SIGNIFICANT CHANGE UP (ref 10–40)
BASE EXCESS BLDV CALC-SCNC: -0.5 MMOL/L — SIGNIFICANT CHANGE UP (ref -2–3)
BASOPHILS # BLD AUTO: 0.03 K/UL — SIGNIFICANT CHANGE UP (ref 0–0.2)
BASOPHILS NFR BLD AUTO: 0.6 % — SIGNIFICANT CHANGE UP (ref 0–2)
BILIRUB SERPL-MCNC: 0.3 MG/DL — SIGNIFICANT CHANGE UP (ref 0.2–1.2)
BILIRUB UR-MCNC: NEGATIVE — SIGNIFICANT CHANGE UP
BUN SERPL-MCNC: 33 MG/DL — HIGH (ref 7–23)
BUN SERPL-MCNC: 34 MG/DL — HIGH (ref 7–23)
CA-I SERPL-SCNC: 1.28 MMOL/L — SIGNIFICANT CHANGE UP (ref 1.15–1.33)
CALCIUM SERPL-MCNC: 9.3 MG/DL — SIGNIFICANT CHANGE UP (ref 8.4–10.5)
CALCIUM SERPL-MCNC: 9.8 MG/DL — SIGNIFICANT CHANGE UP (ref 8.4–10.5)
CHLORIDE BLDV-SCNC: 106 MMOL/L — SIGNIFICANT CHANGE UP (ref 96–108)
CHLORIDE SERPL-SCNC: 102 MMOL/L — SIGNIFICANT CHANGE UP (ref 96–108)
CHLORIDE SERPL-SCNC: 104 MMOL/L — SIGNIFICANT CHANGE UP (ref 96–108)
CO2 BLDV-SCNC: 27 MMOL/L — HIGH (ref 22–26)
CO2 SERPL-SCNC: 19 MMOL/L — LOW (ref 22–31)
CO2 SERPL-SCNC: 21 MMOL/L — LOW (ref 22–31)
COLOR SPEC: YELLOW — SIGNIFICANT CHANGE UP
CREAT SERPL-MCNC: 1.83 MG/DL — HIGH (ref 0.5–1.3)
CREAT SERPL-MCNC: 2.04 MG/DL — HIGH (ref 0.5–1.3)
DIFF PNL FLD: NEGATIVE — SIGNIFICANT CHANGE UP
EGFR: 23 ML/MIN/1.73M2 — LOW
EGFR: 27 ML/MIN/1.73M2 — LOW
EOSINOPHIL # BLD AUTO: 0.03 K/UL — SIGNIFICANT CHANGE UP (ref 0–0.5)
EOSINOPHIL NFR BLD AUTO: 0.6 % — SIGNIFICANT CHANGE UP (ref 0–6)
FLUAV AG NPH QL: SIGNIFICANT CHANGE UP
FLUBV AG NPH QL: SIGNIFICANT CHANGE UP
GAS PNL BLDV: 136 MMOL/L — SIGNIFICANT CHANGE UP (ref 136–145)
GAS PNL BLDV: SIGNIFICANT CHANGE UP
GAS PNL BLDV: SIGNIFICANT CHANGE UP
GLUCOSE BLDV-MCNC: 97 MG/DL — SIGNIFICANT CHANGE UP (ref 70–99)
GLUCOSE SERPL-MCNC: 65 MG/DL — LOW (ref 70–99)
GLUCOSE SERPL-MCNC: 92 MG/DL — SIGNIFICANT CHANGE UP (ref 70–99)
GLUCOSE UR QL: NEGATIVE MG/DL — SIGNIFICANT CHANGE UP
HCO3 BLDV-SCNC: 26 MMOL/L — SIGNIFICANT CHANGE UP (ref 22–29)
HCT VFR BLD CALC: 35.8 % — SIGNIFICANT CHANGE UP (ref 34.5–45)
HCT VFR BLDA CALC: 33 % — LOW (ref 34.5–46.5)
HGB BLD CALC-MCNC: 11.1 G/DL — LOW (ref 11.7–16.1)
HGB BLD-MCNC: 11.4 G/DL — LOW (ref 11.5–15.5)
IMM GRANULOCYTES NFR BLD AUTO: 0.2 % — SIGNIFICANT CHANGE UP (ref 0–0.9)
INR BLD: 0.96 RATIO — SIGNIFICANT CHANGE UP (ref 0.85–1.18)
KETONES UR-MCNC: ABNORMAL MG/DL
LACTATE BLDV-MCNC: 1.1 MMOL/L — SIGNIFICANT CHANGE UP (ref 0.5–2)
LEUKOCYTE ESTERASE UR-ACNC: NEGATIVE — SIGNIFICANT CHANGE UP
LIDOCAIN IGE QN: 39 U/L — SIGNIFICANT CHANGE UP (ref 7–60)
LYMPHOCYTES # BLD AUTO: 1.08 K/UL — SIGNIFICANT CHANGE UP (ref 1–3.3)
LYMPHOCYTES # BLD AUTO: 23.1 % — SIGNIFICANT CHANGE UP (ref 13–44)
MCHC RBC-ENTMCNC: 28.7 PG — SIGNIFICANT CHANGE UP (ref 27–34)
MCHC RBC-ENTMCNC: 31.8 GM/DL — LOW (ref 32–36)
MCV RBC AUTO: 90.2 FL — SIGNIFICANT CHANGE UP (ref 80–100)
MONOCYTES # BLD AUTO: 0.33 K/UL — SIGNIFICANT CHANGE UP (ref 0–0.9)
MONOCYTES NFR BLD AUTO: 7.1 % — SIGNIFICANT CHANGE UP (ref 2–14)
NEUTROPHILS # BLD AUTO: 3.2 K/UL — SIGNIFICANT CHANGE UP (ref 1.8–7.4)
NEUTROPHILS NFR BLD AUTO: 68.4 % — SIGNIFICANT CHANGE UP (ref 43–77)
NITRITE UR-MCNC: NEGATIVE — SIGNIFICANT CHANGE UP
NRBC # BLD: 0 /100 WBCS — SIGNIFICANT CHANGE UP (ref 0–0)
PCO2 BLDV: 49 MMHG — HIGH (ref 39–42)
PH BLDV: 7.33 — SIGNIFICANT CHANGE UP (ref 7.32–7.43)
PH UR: 5.5 — SIGNIFICANT CHANGE UP (ref 5–8)
PLATELET # BLD AUTO: 216 K/UL — SIGNIFICANT CHANGE UP (ref 150–400)
PO2 BLDV: 33 MMHG — SIGNIFICANT CHANGE UP (ref 25–45)
POTASSIUM BLDV-SCNC: 4.5 MMOL/L — SIGNIFICANT CHANGE UP (ref 3.5–5.1)
POTASSIUM SERPL-MCNC: 4.4 MMOL/L — SIGNIFICANT CHANGE UP (ref 3.5–5.3)
POTASSIUM SERPL-MCNC: 4.6 MMOL/L — SIGNIFICANT CHANGE UP (ref 3.5–5.3)
POTASSIUM SERPL-SCNC: 4.4 MMOL/L — SIGNIFICANT CHANGE UP (ref 3.5–5.3)
POTASSIUM SERPL-SCNC: 4.6 MMOL/L — SIGNIFICANT CHANGE UP (ref 3.5–5.3)
PROT SERPL-MCNC: 7.5 G/DL — SIGNIFICANT CHANGE UP (ref 6–8.3)
PROT UR-MCNC: SIGNIFICANT CHANGE UP MG/DL
PROTHROM AB SERPL-ACNC: 10.6 SEC — SIGNIFICANT CHANGE UP (ref 9.5–13)
RBC # BLD: 3.97 M/UL — SIGNIFICANT CHANGE UP (ref 3.8–5.2)
RBC # FLD: 16 % — HIGH (ref 10.3–14.5)
RSV RNA NPH QL NAA+NON-PROBE: SIGNIFICANT CHANGE UP
SAO2 % BLDV: 45.3 % — LOW (ref 67–88)
SARS-COV-2 RNA SPEC QL NAA+PROBE: SIGNIFICANT CHANGE UP
SODIUM SERPL-SCNC: 136 MMOL/L — SIGNIFICANT CHANGE UP (ref 135–145)
SODIUM SERPL-SCNC: 139 MMOL/L — SIGNIFICANT CHANGE UP (ref 135–145)
SP GR SPEC: 1.01 — SIGNIFICANT CHANGE UP (ref 1–1.03)
TROPONIN T, HIGH SENSITIVITY RESULT: 29 NG/L — SIGNIFICANT CHANGE UP (ref 0–51)
UROBILINOGEN FLD QL: 1 MG/DL — SIGNIFICANT CHANGE UP (ref 0.2–1)
WBC # BLD: 4.68 K/UL — SIGNIFICANT CHANGE UP (ref 3.8–10.5)
WBC # FLD AUTO: 4.68 K/UL — SIGNIFICANT CHANGE UP (ref 3.8–10.5)

## 2024-03-31 PROCEDURE — 99223 1ST HOSP IP/OBS HIGH 75: CPT

## 2024-03-31 PROCEDURE — 99285 EMERGENCY DEPT VISIT HI MDM: CPT

## 2024-03-31 PROCEDURE — 71046 X-RAY EXAM CHEST 2 VIEWS: CPT | Mod: 26

## 2024-03-31 PROCEDURE — 70450 CT HEAD/BRAIN W/O DYE: CPT | Mod: 26,MC

## 2024-03-31 RX ORDER — MULTIVIT-MIN/FERROUS GLUCONATE 9 MG/15 ML
1 LIQUID (ML) ORAL DAILY
Refills: 0 | Status: DISCONTINUED | OUTPATIENT
Start: 2024-03-31 | End: 2024-04-09

## 2024-03-31 RX ORDER — LEVOTHYROXINE SODIUM 125 MCG
1 TABLET ORAL
Refills: 0 | DISCHARGE

## 2024-03-31 RX ORDER — SODIUM CHLORIDE 9 MG/ML
500 INJECTION, SOLUTION INTRAVENOUS ONCE
Refills: 0 | Status: COMPLETED | OUTPATIENT
Start: 2024-03-31 | End: 2024-03-31

## 2024-03-31 RX ORDER — HEPARIN SODIUM 5000 [USP'U]/ML
3500 INJECTION INTRAVENOUS; SUBCUTANEOUS ONCE
Refills: 0 | Status: DISCONTINUED | OUTPATIENT
Start: 2024-03-31 | End: 2024-03-31

## 2024-03-31 RX ORDER — MIDODRINE HYDROCHLORIDE 2.5 MG/1
5 TABLET ORAL THREE TIMES A DAY
Refills: 0 | Status: DISCONTINUED | OUTPATIENT
Start: 2024-03-31 | End: 2024-04-07

## 2024-03-31 RX ORDER — HEPARIN SODIUM 5000 [USP'U]/ML
1500 INJECTION INTRAVENOUS; SUBCUTANEOUS EVERY 6 HOURS
Refills: 0 | Status: DISCONTINUED | OUTPATIENT
Start: 2024-03-31 | End: 2024-03-31

## 2024-03-31 RX ORDER — HEPARIN SODIUM 5000 [USP'U]/ML
3500 INJECTION INTRAVENOUS; SUBCUTANEOUS EVERY 6 HOURS
Refills: 0 | Status: DISCONTINUED | OUTPATIENT
Start: 2024-03-31 | End: 2024-03-31

## 2024-03-31 RX ORDER — HEPARIN SODIUM 5000 [USP'U]/ML
INJECTION INTRAVENOUS; SUBCUTANEOUS
Qty: 25000 | Refills: 0 | Status: DISCONTINUED | OUTPATIENT
Start: 2024-03-31 | End: 2024-03-31

## 2024-03-31 RX ORDER — PREGABALIN 225 MG/1
1000 CAPSULE ORAL DAILY
Refills: 0 | Status: DISCONTINUED | OUTPATIENT
Start: 2024-03-31 | End: 2024-04-09

## 2024-03-31 RX ORDER — LEVOTHYROXINE SODIUM 125 MCG
50 TABLET ORAL DAILY
Refills: 0 | Status: DISCONTINUED | OUTPATIENT
Start: 2024-03-31 | End: 2024-04-09

## 2024-03-31 RX ORDER — ATORVASTATIN CALCIUM 80 MG/1
80 TABLET, FILM COATED ORAL AT BEDTIME
Refills: 0 | Status: DISCONTINUED | OUTPATIENT
Start: 2024-03-31 | End: 2024-04-09

## 2024-03-31 RX ORDER — LANOLIN ALCOHOL/MO/W.PET/CERES
3 CREAM (GRAM) TOPICAL AT BEDTIME
Refills: 0 | Status: DISCONTINUED | OUTPATIENT
Start: 2024-03-31 | End: 2024-04-09

## 2024-03-31 RX ORDER — VENLAFAXINE HCL 75 MG
75 CAPSULE, EXT RELEASE 24 HR ORAL DAILY
Refills: 0 | Status: DISCONTINUED | OUTPATIENT
Start: 2024-03-31 | End: 2024-04-09

## 2024-03-31 RX ORDER — ACETAMINOPHEN 500 MG
650 TABLET ORAL EVERY 6 HOURS
Refills: 0 | Status: DISCONTINUED | OUTPATIENT
Start: 2024-03-31 | End: 2024-04-09

## 2024-03-31 RX ORDER — ASPIRIN/CALCIUM CARB/MAGNESIUM 324 MG
81 TABLET ORAL DAILY
Refills: 0 | Status: DISCONTINUED | OUTPATIENT
Start: 2024-03-31 | End: 2024-04-09

## 2024-03-31 RX ADMIN — SODIUM CHLORIDE 500 MILLILITER(S): 9 INJECTION, SOLUTION INTRAVENOUS at 12:23

## 2024-03-31 RX ADMIN — MIDODRINE HYDROCHLORIDE 5 MILLIGRAM(S): 2.5 TABLET ORAL at 12:23

## 2024-03-31 RX ADMIN — MIDODRINE HYDROCHLORIDE 5 MILLIGRAM(S): 2.5 TABLET ORAL at 17:22

## 2024-03-31 RX ADMIN — ATORVASTATIN CALCIUM 80 MILLIGRAM(S): 80 TABLET, FILM COATED ORAL at 22:26

## 2024-03-31 RX ADMIN — Medication 3 MILLIGRAM(S): at 22:26

## 2024-03-31 NOTE — ED ADULT NURSE REASSESSMENT NOTE - NS ED NURSE REASSESS COMMENT FT1
straight cath performed with 2 RN at bedside, pt tolerated procedure well; 1 attempt; sterile technique maintained; UA/UC collected

## 2024-03-31 NOTE — H&P ADULT - ASSESSMENT
83F with PMHx of hypothyroidism, dementia (AOx1-2 at baseline), depression and orthostatic hypotension presenting for lightheadedness when walking down the stairs.  83F with PMHx of hypothyroidism, dementia (AOx1-2 at baseline), depression and orthostatic hypotension presenting for lightheadedness when walking down the stairs.     #Pre-syncope  - Likely vagally mediated and orthostasis in patient with dementia. Recent work up including MRI brain, CTA H&N, and TTE was unremarkable. Patient was orthostatic positive  - No need for telemetry, low yield  - Resume midodrine 5 mg TID (unclear if patient is compliant)  - Check orthostatics daily to titrate midodrine  - PT consult for weakness/deconditioning    #Hypothyroidism  - Last TSH 0.5  - Continue with Synthroid    #Depression  - Continue with Venlafaxine    #T2DM  - Last A1c was 5.9  - No need to check FS  - Can DC Pioglitazone on DC    PT consult, DC planning

## 2024-03-31 NOTE — ED PROVIDER NOTE - CLINICAL SUMMARY MEDICAL DECISION MAKING FREE TEXT BOX
83 F w/ hx of dementia aaox2 at baseline currently aaox1 hypothyroidisim, HTN, DM, here bibems s/p near syncope when walking down stairs pt denies falling and reports that similar sx 1 month ago, per chart review had similar sx w/out complaints,   On exam, pt is awake and alert orientedx1 soft abdomen, no cva tenderenss clear lungs 2+ radial and DP pt w/ 5/5 upper and lower extremity strength, pt w/ no c/t/l spine tenderness,   Plan for labs eval and reassessment, possible vasovagal however given hx difficult to know events occurring w/ fall, will need discussion w/ family regarding goals and further management recommendations.

## 2024-03-31 NOTE — ED PROVIDER NOTE - OBJECTIVE STATEMENT
83 PMHx significant for dementia (aa0x2 baseline), hypothyroidism, HTN, DM, brought in by ambulance coming in from home after a near syncopal episode while walking down the stairs.  Patient is ANO x 1 EMS reporting that she was going down the stairs got lightheaded never passed out never hit her head.  Patient endorsing a similar story currently without complaints.  Family is in route to provide a more adequate history.

## 2024-03-31 NOTE — ED ADULT NURSE REASSESSMENT NOTE - NS ED NURSE REASSESS COMMENT FT1
Received patient from PRAFUL Walters, patient at baseline mental status, able to make needs known, NAD, patient agreeable to plan of care, pending bed assignment, comfort and safety provided. Pt pulled out IV access. Inserted new 22g IV in R forearm with cling wrapped so pt can not pull IV again.

## 2024-03-31 NOTE — H&P ADULT - NSHPPHYSICALEXAM_GEN_ALL_CORE
T(C): 36.4 (03-31-24 @ 12:29), Max: 36.6 (03-31-24 @ 10:07)  HR: 77 (03-31-24 @ 12:29) (77 - 84)  BP: 143/50 (03-31-24 @ 12:29) (119/79 - 143/50)  RR: 18 (03-31-24 @ 12:29) (18 - 20)  SpO2: 99% (03-31-24 @ 12:29) (97% - 99%)    GEN: female in NAD, appears comfortable, no diaphoresis  EYES: No scleral injection, PERRL, EOMI  ENTM: neck supple & symmetric without tracheal deviation, moist membranes, no gross hearing impairment, thyroid gland not enlarged  CV: +S1/S2, no m/r/g, no abdominal bruit, no LE edema  RESP: breathing comfortably, no respiratory accessory muscle use, CTAB, no w/r/r  GI: normoactive BS, soft, NTND, no rebounding/guarding, no palpable masses  LYMPHATICS: no LAD or tenderness to palpation  NEURO: AOx1, no focal deficits, CNII-XII grossly intact  PSYCH: No SI/HI/AVH, appropriate affect, poor insight/judgment   SKIN: no petechiae, ecchymosis or maculopapular rash noted

## 2024-03-31 NOTE — ED PROVIDER NOTE - PROGRESS NOTE DETAILS
phone call to [facility] states she is on her way to the hospital Pt unable to ambulate, tried 3 times.  Spoke with family, will admit for near syncope.  Ramon

## 2024-03-31 NOTE — H&P ADULT - HISTORY OF PRESENT ILLNESS
83 PMHx significant for dementia (aa0x2 baseline), hypothyroidism, HTN, DM, brought in by ambulance coming in from home after a near syncopal episode while walking down the stairs.  Patient is ANO x 1 EMS reporting that she was going down the stairs got lightheaded never passed out never hit her head.  Patient endorsing a similar story currently without complaints.  Family is in route to provide a more adequate history. 83F with PMHx of hypothyroidism, dementia (AOx1-2 at baseline), depression and orthostatic hypotension presenting for lightheadedness when walking down the stairs. Patient recently admitted several weeks prior here for lightheadedness and pre-syncope. Extensive work up at that time included CT head, CTA H&N, MRI brain, and TTE. 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 Cardiology consulted and there was no intervention. CTA H&N with aneurysm, but per neurosurgery no intervention. Patient found to be orthostatic positive and was started on Midodrine 5 mg TID. Neurology was consulted and they believe her lightheadedness was due to orthostasis and vagally mediated. Patient eventually discharged home (though she was recommended for [facility]). Patient states she lives alone with an aide coming by. She was walking down the stairs when she began to feel lightheaded, but did not pass out. Given symptoms she called EMS who brought her in here. Here patient without complaints such as dizziness. CT head without acute pathology. The ED attempted to ambulate patient three times, but she was unable to.

## 2024-03-31 NOTE — ED ADULT NURSE NOTE - OBJECTIVE STATEMENT
83year old female [facility] as per EMS pt was walking down the stairs when she felt lightheaded and had a near syncope episode, denies falling or acti 83year old female [facility] as per EMS pt was walking down the stairs when she felt lightheaded and had a near syncope episode, denies falling/LOC, hitting head/ trauma

## 2024-04-01 ENCOUNTER — TRANSCRIPTION ENCOUNTER (OUTPATIENT)
Age: 84
End: 2024-04-01

## 2024-04-01 LAB
CULTURE RESULTS: NO GROWTH — SIGNIFICANT CHANGE UP
SPECIMEN SOURCE: SIGNIFICANT CHANGE UP

## 2024-04-01 RX ORDER — SODIUM BICARBONATE 1 MEQ/ML
0.25 SYRINGE (ML) INTRAVENOUS
Qty: 150 | Refills: 0 | Status: COMPLETED | OUTPATIENT
Start: 2024-04-01 | End: 2024-04-01

## 2024-04-01 RX ORDER — INFLUENZA VIRUS VACCINE 15; 15; 15; 15 UG/.5ML; UG/.5ML; UG/.5ML; UG/.5ML
0.7 SUSPENSION INTRAMUSCULAR ONCE
Refills: 0 | Status: DISCONTINUED | OUTPATIENT
Start: 2024-04-01 | End: 2024-04-09

## 2024-04-01 RX ADMIN — Medication 75 MEQ/KG/HR: at 23:45

## 2024-04-01 RX ADMIN — ATORVASTATIN CALCIUM 80 MILLIGRAM(S): 80 TABLET, FILM COATED ORAL at 22:33

## 2024-04-01 RX ADMIN — PREGABALIN 1000 MICROGRAM(S): 225 CAPSULE ORAL at 11:58

## 2024-04-01 RX ADMIN — Medication 75 MILLIGRAM(S): at 11:58

## 2024-04-01 RX ADMIN — Medication 1 TABLET(S): at 11:58

## 2024-04-01 RX ADMIN — Medication 650 MILLIGRAM(S): at 13:19

## 2024-04-01 RX ADMIN — Medication 3 MILLIGRAM(S): at 22:33

## 2024-04-01 RX ADMIN — Medication 50 MICROGRAM(S): at 05:38

## 2024-04-01 RX ADMIN — Medication 81 MILLIGRAM(S): at 11:58

## 2024-04-01 RX ADMIN — Medication 650 MILLIGRAM(S): at 14:19

## 2024-04-01 NOTE — PHYSICAL THERAPY INITIAL EVALUATION ADULT - PERTINENT HX OF CURRENT PROBLEM, REHAB EVAL
83F with PMHx of hypothyroidism, dementia (AOx1-2 at baseline), depression and orthostatic hypotension presenting for lightheadedness when walking down the stairs.CXR Clear lungs  CTH No evidence of acute intracranial hemorrhageor midline shift. Chronic small vessel disease. If there is continued concern for acute neurologic compromise, recommend MRI of the brain for further evaluation.

## 2024-04-01 NOTE — PATIENT PROFILE ADULT - FUNCTIONAL ASSESSMENT - BASIC MOBILITY 2.
Start daily children's Claritin 2.5-5mg once daily x 1-2 weeks  Can add benadryl at night if needed  
3 = A little assistance

## 2024-04-01 NOTE — CONSULT NOTE ADULT - SUBJECTIVE AND OBJECTIVE BOX
Dr. Skaggs  Office [phone] (9 am to 5 pm)  Service : [phone] ( 5pm to 9 am)    Courtney HOOVER      RENAL INITIAL CONSULT NOTE: DATE OF SERVICE 04-01-24 @ 17:28    HPI:  83F with PMHx of hypothyroidism, dementia (AOx1-2 at baseline), depression and orthostatic hypotension presenting for lightheadedness when walking down the stairs. Patient recently admitted several weeks prior here for lightheadedness and pre-syncope. Extensive work up at that time included CT head, CTA H&N, MRI brain, and TTE. 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 Cardiology consulted and there was no intervention. CTA H&N with aneurysm, but per neurosurgery no intervention. Patient found to be orthostatic positive and was started on Midodrine 5 mg TID. Neurology was consulted and they believe her lightheadedness was due to orthostasis and vagally mediated. Patient eventually discharged home (though she was recommended for [facility]). Patient states she lives alone with an aide coming by. She was walking down the stairs when she began to feel lightheaded, but did not pass out. Given symptoms she called EMS who brought her in here. Here patient without complaints such as dizziness. CT head without acute pathology. The ED attempted to ambulate patient three times, but she was unable to.  We got involved for PARVEEN    Allergies:  No Known Allergies      PAST MEDICAL & SURGICAL HISTORY:  Type 2 diabetes mellitus      No significant past surgical history          Home Medications Reviewed    Hospital Medications:   MEDICATIONS  (STANDING):  aspirin  chewable 81 milliGRAM(s) Oral daily  atorvastatin 80 milliGRAM(s) Oral at bedtime  cyanocobalamin 1000 MICROGram(s) Oral daily  influenza  Vaccine (HIGH DOSE) 0.7 milliLiter(s) IntraMuscular once  levothyroxine 50 MICROGram(s) Oral daily  melatonin 3 milliGRAM(s) Oral at bedtime  midodrine. 5 milliGRAM(s) Oral three times a day  multivitamin/minerals 1 Tablet(s) Oral daily  venlafaxine XR. 75 milliGRAM(s) Oral daily      SOCIAL HISTORY:  Denies ETOh, Smoking,     FAMILY HISTORY:  No pertinent family history in first degree relatives        REVIEW OF SYSTEMS:  CONSTITUTIONAL: No weakness, fevers or chills  EYES/ENT: No visual changes;  No vertigo or throat pain   NECK: No pain or stiffness  RESPIRATORY: No cough, wheezing, hemoptysis; No shortness of breath  CARDIOVASCULAR: No chest pain or palpitations.  GASTROINTESTINAL: No abdominal or epigastric pain. No nausea, vomiting, or hematemesis; No diarrhea or constipation. No melena or hematochezia.  GENITOURINARY: No dysuria, frequency, foamy urine, urinary urgency, incontinence or hematuria  NEUROLOGICAL: No numbness or weakness  SKIN: No itching, burning, rashes, or lesions   VASCULAR: No bilateral lower extremity edema.   All other review of systems is negative unless indicated above.    VITALS:  T(F): 98.3 (04-01-24 @ 12:48), Max: 98.3 (04-01-24 @ 12:48)  HR: 80 (04-01-24 @ 14:31)  BP: 143/58 (04-01-24 @ 14:31)  RR: 18 (04-01-24 @ 14:31)  SpO2: 95% (04-01-24 @ 14:31)  Wt(kg): --        PHYSICAL EXAM:  Constitutional: NAD  HEENT: anicteric sclera, oropharynx clear, MMM  Neck: No JVD  Respiratory: CTAB, no wheezes, rales or rhonchi  Cardiovascular: S1, S2, RRR  Gastrointestinal: BS+, soft, NT/ND  Extremities: No cyanosis or clubbing. No peripheral edema  Neurological: Alert, no focal deficits  Psychiatric: Normal mood, normal affect  : No CVA tenderness. No boogie.   Skin: No rashes, dry       LABS:  03-31    136  |  104  |  33<H>  ----------------------------<  65<L>  4.6   |  19<L>  |  1.83<H>    Ca    9.3      31 Mar 2024 14:34    TPro  7.5  /  Alb  4.3  /  TBili  0.3  /  DBili      /  AST  40  /  ALT  16  /  AlkPhos  72  03-31    Creatinine Trend: 1.83 <--, 2.04 <--                        11.4   4.68  )-----------( 216      ( 31 Mar 2024 11:04 )             35.8     Urine Studies:  Urinalysis Basic - ( 31 Mar 2024 14:34 )    Color:  / Appearance:  / SG:  / pH:   Gluc: 65 mg/dL / Ketone:   / Bili:  / Urobili:    Blood:  / Protein:  / Nitrite:    Leuk Esterase:  / RBC:  / WBC    Sq Epi:  / Non Sq Epi:  / Bacteria:           RADIOLOGY & ADDITIONAL STUDIES:

## 2024-04-01 NOTE — PROGRESS NOTE ADULT - SUBJECTIVE AND OBJECTIVE BOX
SUBJECTIVE / OVERNIGHT EVENTS:      Patient seen and examined at bedside. Resting comfortably in bed      --------------------------------------------------------------------------------------------  LABS:                        11.4   4.68  )-----------( 216      ( 31 Mar 2024 11:04 )             35.8     03-31    136  |  104  |  33<H>  ----------------------------<  65<L>  4.6   |  19<L>  |  1.83<H>    Ca    9.3      31 Mar 2024 14:34    TPro  7.5  /  Alb  4.3  /  TBili  0.3  /  DBili  x   /  AST  40  /  ALT  16  /  AlkPhos  72  03-31    PT/INR - ( 31 Mar 2024 11:04 )   PT: 10.6 sec;   INR: 0.96 ratio         PTT - ( 31 Mar 2024 11:04 )  PTT:27.4 sec  CAPILLARY BLOOD GLUCOSE            Urinalysis Basic - ( 31 Mar 2024 14:34 )    Color: x / Appearance: x / SG: x / pH: x  Gluc: 65 mg/dL / Ketone: x  / Bili: x / Urobili: x   Blood: x / Protein: x / Nitrite: x   Leuk Esterase: x / RBC: x / WBC x   Sq Epi: x / Non Sq Epi: x / Bacteria: x        RADIOLOGY & ADDITIONAL TESTS: < from: CT Head No Cont (03.31.24 @ 14:02) >  IMPRESSION:    1.  No evidence of acute intracranial hemorrhageor midline shift.  2.  Chronic small vessel disease. If there is continued concern for acute   neurologic compromise, recommend MRI of the brain for further evaluation.      < end of copied text >  < from: Xray Chest 2 Views PA/Lat (03.31.24 @ 10:50) >  IMPRESSION:  Clear lungs. Findings unchanged    < end of copied text >      Imaging Personally Reviewed:  [x] YES  [ ] NO    Consultant(s) Notes Reviewed:  [x] YES  [ ] NO    MEDICATIONS  (STANDING):  aspirin  chewable 81 milliGRAM(s) Oral daily  atorvastatin 80 milliGRAM(s) Oral at bedtime  cyanocobalamin 1000 MICROGram(s) Oral daily  levothyroxine 50 MICROGram(s) Oral daily  melatonin 3 milliGRAM(s) Oral at bedtime  midodrine. 5 milliGRAM(s) Oral three times a day  multivitamin/minerals 1 Tablet(s) Oral daily  venlafaxine XR. 75 milliGRAM(s) Oral daily    MEDICATIONS  (PRN):  acetaminophen     Tablet .. 650 milliGRAM(s) Oral every 6 hours PRN Temp greater or equal to 38C (100.4F), Mild Pain (1 - 3), Moderate Pain (4 - 6), Severe Pain (7 - 10)      Care Discussed with Consultants/Other Providers [x] YES  [ ] NO    Vital Signs Last 24 Hrs  T(C): 36.6 (01 Apr 2024 05:40), Max: 36.7 (31 Mar 2024 17:20)  T(F): 97.8 (01 Apr 2024 05:40), Max: 98 (31 Mar 2024 17:20)  HR: 74 (01 Apr 2024 05:40) (70 - 84)  BP: 167/85 (01 Apr 2024 05:40) (119/79 - 167/85)  BP(mean): 72 (31 Mar 2024 12:29) (72 - 83)  RR: 16 (01 Apr 2024 05:40) (16 - 20)  SpO2: 98% (01 Apr 2024 05:40) (96% - 100%)    Parameters below as of 01 Apr 2024 05:40  Patient On (Oxygen Delivery Method): room air      I&O's Summary    PHYSICAL EXAM:  GENERAL: NAD  HEAD:  Atraumatic, Normocephalic  EYES: EOMI, PERRLA, conjunctiva and sclera clear  NECK: Supple, No JVD  CHEST/LUNG: Diminished bilaterally; No wheeze  HEART: Regular rate and rhythm; No murmurs, rubs, or gallops  ABDOMEN: Soft, Nontender, Nondistended; Bowel sounds present  NEURO: AAOx1, no focal weakness, 5/5 b/l extremity strength, b/l knee no arthritis, no effusion   EXTREMITIES:  2+ Peripheral Pulses, No clubbing, cyanosis, or edema  SKIN: No rashes or lesions

## 2024-04-01 NOTE — DISCHARGE NOTE PROVIDER - NSDCFUADDAPPT_GEN_ALL_CORE_FT
APPTS ARE READY TO BE MADE: [ ] YES    Best Family or Patient Contact (if needed):    Additional Information about above appointments (if needed):    1: Follow-up with PCP.  2:   3:     Other comments or requests:

## 2024-04-01 NOTE — DISCHARGE NOTE PROVIDER - DETAILS OF MALNUTRITION DIAGNOSIS/DIAGNOSES
This patient has been assessed with a concern for Malnutrition and was treated during this hospitalization for the following Nutrition diagnosis/diagnoses:     -  04/03/2024: Severe protein-calorie malnutrition   -  04/03/2024: Underweight (BMI < 19)

## 2024-04-01 NOTE — PHYSICAL THERAPY INITIAL EVALUATION ADULT - ADDITIONAL COMMENTS
Attempted to contact Mya (HCP) at [phone], no answer. Per prior chart Pt lives in a private home with family there are 5 steps to enter, 0 inside. Pt performed ADL/IADLs independently. Ambulates with no assistive device. Owns DME: rolling walker, commode.

## 2024-04-01 NOTE — PROGRESS NOTE ADULT - ASSESSMENT
83F with PMHx of hypothyroidism, dementia (AOx1-2 at baseline), depression and orthostatic hypotension presenting for lightheadedness when walking down the stairs.     Plan:    # Presyncope:  - Likely vagally mediated and orthostasis  - CXR w/ clear lungs  - CTH w/ No evidence of acute intracranial hemorrhage or midline shift  - Trend Orthos  - PT eval  - C/w Midodrine  - Cards eval pending (called)    # PARVEEN:   - Monitor I/O's  - Serial Cr  - Avoid nephrotoxins  - Renally dose medications  - Renal eval pending (called)    # HLD:  - C/w Statin    # Hypothyroidism:  - C/w Synthroid    # Depression:  - C/w Venlafaxine    # GI ppx:  - Bowel regimen prn    # DVT ppx:  - IPC's    [facility]  [phone]

## 2024-04-01 NOTE — CONSULT NOTE ADULT - SUBJECTIVE AND OBJECTIVE BOX
Neurology Consult    Reason for Consult: Patient is a 83y old  Female who presents with a chief complaint of Lightheadedness (01 Apr 2024 08:52)      HPI:  83F with PMHx of hypothyroidism, dementia (AOx1-2 at baseline), depression and orthostatic hypotension presenting for lightheadedness when walking down the stairs. Patient recently admitted several weeks prior here for lightheadedness and pre-syncope. Extensive work up at that time included CT head, CTA H&N, MRI brain, and TTE. 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 Cardiology consulted and there was no intervention. CTA H&N with aneurysm, but per neurosurgery no intervention. Patient found to be orthostatic positive and was started on Midodrine 5 mg TID. Neurology was consulted and they believe her lightheadedness was due to orthostasis and vagally mediated. Patient eventually discharged home (though she was recommended for [facility]). Patient states she lives alone with an aide coming by. She was walking down the stairs when she began to feel lightheaded, but did not pass out. Given symptoms she called EMS who brought her in here. Here patient without complaints such as dizziness. CT head without acute pathology. The ED attempted to ambulate patient three times, but she was unable to.  (31 Mar 2024 16:44)       PAST MEDICAL & SURGICAL HISTORY:  Type 2 diabetes mellitus      No significant past surgical history          Allergies: Allergies    No Known Allergies    Intolerances        Social History: Denies toxic habits including tobacco, ETOH or illicit drugs.    Family History: FAMILY HISTORY:  No pertinent family history in first degree relatives    . No family history of strokes    Medications: MEDICATIONS  (STANDING):  aspirin  chewable 81 milliGRAM(s) Oral daily  atorvastatin 80 milliGRAM(s) Oral at bedtime  cyanocobalamin 1000 MICROGram(s) Oral daily  levothyroxine 50 MICROGram(s) Oral daily  melatonin 3 milliGRAM(s) Oral at bedtime  midodrine. 5 milliGRAM(s) Oral three times a day  multivitamin/minerals 1 Tablet(s) Oral daily  venlafaxine XR. 75 milliGRAM(s) Oral daily    MEDICATIONS  (PRN):  acetaminophen     Tablet .. 650 milliGRAM(s) Oral every 6 hours PRN Temp greater or equal to 38C (100.4F), Mild Pain (1 - 3), Moderate Pain (4 - 6), Severe Pain (7 - 10)      Review of Systems:  CONSTITUTIONAL:  No weight loss, fever, chills, weakness or fatigue.  HEENT:  Eyes:  No visual loss, blurred vision, double vision or yellow sclera. Ears, Nose, Throat:  No hearing loss, sneezing, congestion, runny nose or sore throat.  SKIN:  No rash or itching.  CARDIOVASCULAR:  No chest pain, chest pressure or chest discomfort. No palpitations or edema.  RESPIRATORY:  No shortness of breath, cough or sputum.  GASTROINTESTINAL:  No anorexia, nausea, vomiting or diarrhea. No abdominal pain or blood.  GENITOURINARY:  No burning on urination or incontinence   NEUROLOGICAL:  No headache, dizziness, syncope, paralysis, ataxia, numbness or tingling in the extremities. No change in bowel or bladder control. no limb weakness. no vision changes.   MUSCULOSKELETAL:  No muscle, back pain, joint pain or stiffness.  HEMATOLOGIC:  No anemia, bleeding or bruising.  LYMPHATICS:  No enlarged nodes. No history of splenectomy.  PSYCHIATRIC:  No history of depression or anxiety.  ENDOCRINOLOGIC:  No reports of sweating, cold or heat intolerance. No polyuria or polydipsia.      Vitals:  Vital Signs Last 24 Hrs  T(C): 36.2 (01 Apr 2024 08:45), Max: 36.7 (31 Mar 2024 17:20)  T(F): 97.2 (01 Apr 2024 08:45), Max: 98 (31 Mar 2024 17:20)  HR: 74 (01 Apr 2024 05:40) (70 - 84)  BP: 167/85 (01 Apr 2024 05:40) (134/70 - 167/85)  BP(mean): 72 (31 Mar 2024 12:29) (72 - 83)  RR: 14 (01 Apr 2024 08:45) (14 - 20)  SpO2: 98% (01 Apr 2024 08:45) (96% - 100%)    Parameters below as of 01 Apr 2024 08:45  Patient On (Oxygen Delivery Method): room air    Orthostatic VS    04-01-24 @ 08:45  Lying BP: Orthostatic BP (Lying Systolic): 169/Orthostatic BP (Lying Diastolic (mm Hg)): 69 HR: Orthostatic Pulse (Heart Rate (beats/min)): 70   Sitting BP: Orthostatic BP (Sitting Systolic): 155/Orthostatic BP (Sitting Diastolic (mm Hg)): 65 HR: Orthostatic Pulse (Heart Rate (beats/min)): 75  Standing BP: Orthostatic BP (Standing Systolic): 150/-- HR: Orthostatic Pulse (Heart Rate (beats/min)): 89  Site: Orthostatic BP/Pulse (Site): upper right arm   Mode: Orthostatic BP/ Pulse (Mode): electronic    04-01-24 @ 05:49  Lying BP: Orthostatic BP (Lying Systolic): 167/Orthostatic BP (Lying Diastolic (mm Hg)): 85 HR: Orthostatic Pulse (Heart Rate (beats/min)): 74   Sitting BP: Orthostatic BP (Sitting Systolic): 166/Orthostatic BP (Sitting Diastolic (mm Hg)): 62 HR: Orthostatic Pulse (Heart Rate (beats/min)): 83  Standing BP: Orthostatic BP (Standing Systolic): 142/Orthostatic BP (Standing Diastolic (mm Hg)): 63 HR: Orthostatic Pulse (Heart Rate (beats/min)): 88  Site: Orthostatic BP/Pulse (Site): upper right arm   Mode: Orthostatic BP/ Pulse (Mode): electronic      General Exam:   General Appearance: Appropriately dressed and in no acute distress       Head: Normocephalic, atraumatic and no dysmorphic features  Ear, Nose, and Throat: Moist mucous membranes  CVS: S1S2+  Resp: No SOB, no wheeze or rhonchi  GI: soft NT/ND  Extremities: No edema or cyanosis  Skin: No bruises or rashes     Neurological Exam:  Neurological Exam:  Mental Status: Awake, alert and oriented x 2  Able to follow simple and complex verbal commands. Able to name and repeat. fluent speech. No obvious aphasia or dysarthria noted.   Cranial Nerves: PERRL, EOMI, VFFC, sensation V1-V3 intact,  no obvious facial asymmetry , equal elevation of palate, scm/trap 5/5, tongue is midline on protrusion. no obvious papilledema on fundoscopic exam. Hearing is grossly intact.   Motor: Normal bulk, tone and strength throughout. Fine finger movements were intact and symmetric. no tremors or drift noted.    Sensation: Intact to light touch and pinprick throughout. no right/left confusion. no extinction to tactile on DSS.   Reflexes: 1+ throughout at biceps, brachioradialis, triceps, patellars and ankles bilaterally and equal. No clonus. R toe and L toe were both downgoing.  Coordination: No dysmetria on FNF or HKS  Gait: deferred         Data/Labs/Imaging which I personally reviewed.     Labs:     CBC Full  -  ( 31 Mar 2024 11:04 )  WBC Count : 4.68 K/uL  RBC Count : 3.97 M/uL  Hemoglobin : 11.4 g/dL  Hematocrit : 35.8 %  Platelet Count - Automated : 216 K/uL  Mean Cell Volume : 90.2 fl  Mean Cell Hemoglobin : 28.7 pg  Mean Cell Hemoglobin Concentration : 31.8 gm/dL  Auto Neutrophil # : 3.20 K/uL  Auto Lymphocyte # : 1.08 K/uL  Auto Monocyte # : 0.33 K/uL  Auto Eosinophil # : 0.03 K/uL  Auto Basophil # : 0.03 K/uL  Auto Neutrophil % : 68.4 %  Auto Lymphocyte % : 23.1 %  Auto Monocyte % : 7.1 %  Auto Eosinophil % : 0.6 %  Auto Basophil % : 0.6 %    03-31    136  |  104  |  33<H>  ----------------------------<  65<L>  4.6   |  19<L>  |  1.83<H>    Ca    9.3      31 Mar 2024 14:34    TPro  7.5  /  Alb  4.3  /  TBili  0.3  /  DBili  x   /  AST  40  /  ALT  16  /  AlkPhos  72  03-31    LIVER FUNCTIONS - ( 31 Mar 2024 11:04 )  Alb: 4.3 g/dL / Pro: 7.5 g/dL / ALK PHOS: 72 U/L / ALT: 16 U/L / AST: 40 U/L / GGT: x           PT/INR - ( 31 Mar 2024 11:04 )   PT: 10.6 sec;   INR: 0.96 ratio         PTT - ( 31 Mar 2024 11:04 )  PTT:27.4 sec  Urinalysis Basic - ( 31 Mar 2024 14:34 )    Color: x / Appearance: x / SG: x / pH: x  Gluc: 65 mg/dL / Ketone: x  / Bili: x / Urobili: x   Blood: x / Protein: x / Nitrite: x   Leuk Esterase: x / RBC: x / WBC x   Sq Epi: x / Non Sq Epi: x / Bacteria: x        Culture - Urine (collected 03-31-24 @ 11:26)  Source: Clean Catch Clean Catch (Midstream)  Final Report (04-01-24 @ 09:34):    No growth    < from: CT Head No Cont (03.31.24 @ 14:02) >    ACC: [id number] EXAM:  CT BRAIN   ORDERED BY: PETTY ALTAMIRANO     PROCEDURE DATE:  03/31/2024          INTERPRETATION:  EXAM: CT HEAD WITHOUT INTRAVENOUS CONTRAST    HISTORY: Syncopal episode, AAO x1    TECHNIQUE: Multiple axial images were obtained from the skull base to the   vertex. Sagittal and coronal reformatted images were obtained from the   axial data set. The images were reviewed in brain and bone windows.    COMPARISON: MRI of the brain November 29, 2024, CT of the head February 28, 2024    FINDINGS:    No acute intracranial hemorrhage. Areas of decreased attenuation   throughout the deep and periventricular white matter, compatible with   chronic small vessel disease. Parenchymal volume loss resulting in a ex   vacuo dilatation appearance of the bilateral ventricles. The extra-axial   spaces and basal cisterns are within normal limits. No midline shift or   mass effect present.    The cranial cervical junction is within normal limits. The sella is not   expanded. No depressed calvarial fracture. Left ethmoid air cell   polyp/mucous retention cyst.. The visualized mastoid air cells are well   aerated. The visualized orbits are status post cataract surgery.    IMPRESSION:    1.  No evidence of acute intracranial hemorrhageor midline shift.  2.  Chronic small vessel disease. If there is continued concern for acute   neurologic compromise, recommend MRI of the brain for further evaluation.      --- End of Report ---           TANISHA MCDOWELL MD; Resident Radiologist  This document has been electronically signed.  MIKE EATON MD; Attending Radiologist  This document has been electronically signed. Mar 31 2024  2:13PM    < end of copied text >

## 2024-04-01 NOTE — PATIENT PROFILE ADULT - FALL HARM RISK - HARM RISK INTERVENTIONS
Assistance with ambulation/Assistance OOB with selected safe patient handling equipment/Communicate Risk of Fall with Harm to all staff/Monitor for mental status changes/Move patient closer to nurses' station/Reinforce activity limits and safety measures with patient and family/Reorient to person, place and time as needed/Tailored Fall Risk Interventions/Toileting schedule using arm’s reach rule for commode and bathroom/Use of alarms - bed, chair and/or voice tab/Visual Cue: Yellow wristband and red socks/Bed in lowest position, wheels locked, appropriate side rails in place/Call bell, personal items and telephone in reach/Instruct patient to call for assistance before getting out of bed or chair/Non-slip footwear when patient is out of bed/[place] to call system/Physically safe environment - no spills, clutter or unnecessary equipment/Purposeful Proactive Rounding/Room/bathroom lighting operational, light cord in reach

## 2024-04-01 NOTE — DISCHARGE NOTE PROVIDER - NSDCMRMEDTOKEN_GEN_ALL_CORE_FT
aspirin 81 mg oral tablet, chewable: 1 tab(s) orally once a day  atorvastatin 80 mg oral tablet: 1 tab(s) orally once a day (at bedtime)  melatonin 3 mg oral tablet: 1 tab(s) orally once a day (at bedtime)  midodrine 5 mg oral tablet: 1 tab(s) orally 3 times a day  Multiple Vitamins with Minerals oral tablet: 1 tab(s) orally once a day  pioglitazone 30 mg oral tablet: 1 tab(s) orally once a day  Synthroid 50 mcg (0.05 mg) oral tablet: 1 tab(s) orally once a day  venlafaxine 37.5 mg oral capsule, extended release: 2 cap(s) orally once a day   acetaminophen 325 mg oral tablet: 2 tab(s) orally every 6 hours As needed Temp greater or equal to 38C (100.4F), Mild Pain (1 - 3), Moderate Pain (4 - 6), Severe Pain (7 - 10)  aspirin 81 mg oral tablet, chewable: 1 tab(s) orally once a day  atorvastatin 80 mg oral tablet: 1 tab(s) orally once a day (at bedtime)  cyanocobalamin 1000 mcg oral tablet: 1 tab(s) orally once a day  melatonin 3 mg oral tablet: 1 tab(s) orally once a day (at bedtime)  midodrine 5 mg oral tablet: 1 tab(s) orally 3 times a day  Multiple Vitamins with Minerals oral tablet: 1 tab(s) orally once a day  Synthroid 50 mcg (0.05 mg) oral tablet: 1 tab(s) orally once a day  venlafaxine 75 mg oral capsule, extended release: 1 cap(s) orally once a day

## 2024-04-01 NOTE — CONSULT NOTE ADULT - SUBJECTIVE AND OBJECTIVE BOX
DATE OF SERVICE: 04-01-24 @ 12:59    CHIEF COMPLAINT:Patient is a 83y old  Female who presents with a chief complaint of Lightheadedness (01 Apr 2024 10:21)      HISTORY OF PRESENT ILLNESS:HPI:  83F with PMHx of hypothyroidism, dementia (AOx1-2 at baseline), depression and orthostatic hypotension presenting for lightheadedness when walking down the stairs. Patient recently admitted several weeks prior here for lightheadedness and pre-syncope. Extensive work up at that time included CT head, CTA H&N, MRI brain, and TTE. 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 Cardiology consulted and there was no intervention. CTA H&N with aneurysm, but per neurosurgery no intervention. Patient found to be orthostatic positive and was started on Midodrine 5 mg TID. Neurology was consulted and they believe her lightheadedness was due to orthostasis and vagally mediated. Patient eventually discharged home (though she was recommended for [facility]). Patient states she lives alone with an aide coming by. She was walking down the stairs when she began to feel lightheaded, but did not pass out. Given symptoms she called EMS who brought her in here. Here patient without complaints such as dizziness. CT head without acute pathology. The ED attempted to ambulate patient three times, but she was unable to.  (31 Mar 2024 16:44)      PAST MEDICAL & SURGICAL HISTORY:  Type 2 diabetes mellitus      No significant past surgical history              MEDICATIONS:  aspirin  chewable 81 milliGRAM(s) Oral daily  midodrine. 5 milliGRAM(s) Oral three times a day        acetaminophen     Tablet .. 650 milliGRAM(s) Oral every 6 hours PRN  melatonin 3 milliGRAM(s) Oral at bedtime  venlafaxine XR. 75 milliGRAM(s) Oral daily      atorvastatin 80 milliGRAM(s) Oral at bedtime  levothyroxine 50 MICROGram(s) Oral daily    cyanocobalamin 1000 MICROGram(s) Oral daily  influenza  Vaccine (HIGH DOSE) 0.7 milliLiter(s) IntraMuscular once  multivitamin/minerals 1 Tablet(s) Oral daily      FAMILY HISTORY:  No pertinent family history in first degree relatives        Non-contributory    SOCIAL HISTORY:    [x] Tobacco  [x] Drugs  [x] Alcohol    Allergies    No Known Allergies    Intolerances    	    REVIEW OF SYSTEMS:  CONSTITUTIONAL: No fever  EYES: No eye pain, visual disturbances, or discharge  ENMT:  No difficulty hearing, tinnitus  NECK: No pain or stiffness  RESPIRATORY: No cough, wheezing,  CARDIOVASCULAR: No chest pain, palpitations, passing out, dizziness, or leg swelling  GASTROINTESTINAL:  No nausea, vomiting, diarrhea or constipation. No melena.  GENITOURINARY: No dysuria, hematuria  NEUROLOGICAL: No stroke like symptoms  SKIN: No burning or lesions   ENDOCRINE: No heat or cold intolerance  MUSCULOSKELETAL: No joint pain or swelling  PSYCHIATRIC: No  anxiety, mood swings  HEME/LYMPH: No bleeding gums  ALLERGY AND IMMUNOLOGIC: No hives or eczema	    All other ROS negative    PHYSICAL EXAM:  T(C): 36.8 (04-01-24 @ 12:48), Max: 36.8 (04-01-24 @ 12:48)  HR: 76 (04-01-24 @ 12:48) (70 - 76)  BP: 176/91 (04-01-24 @ 12:48) (139/58 - 176/91)  RR: 18 (04-01-24 @ 12:48) (14 - 18)  SpO2: 95% (04-01-24 @ 12:48) (95% - 100%)  Wt(kg): --  I&O's Summary      Appearance: Normal	  HEENT:   Normal oral mucosa, EOMI	  Cardiovascular:  S1 S2, No JVD,    Respiratory: Lungs clear to auscultation	  Psychiatry: Alert  Gastrointestinal:  Soft, Non-tender, + BS	  Skin: No rashes   Neurologic: Non-focal  Extremities:  No edema  Vascular: Peripheral pulses palpable    	    	  	  CARDIAC MARKERS:  Labs personally reviewed by me                                  11.4   4.68  )-----------( 216      ( 31 Mar 2024 11:04 )             35.8     03-31    136  |  104  |  33<H>  ----------------------------<  65<L>  4.6   |  19<L>  |  1.83<H>    Ca    9.3      31 Mar 2024 14:34    TPro  7.5  /  Alb  4.3  /  TBili  0.3  /  DBili  x   /  AST  40  /  ALT  16  /  AlkPhos  72  03-31          EKG: Personally reviewed by me - SR HR 76    Radiology: Personally reviewed by me -    Xray Chest 2 Views PA/Lat (03.31.24 @ 10:50)  FINDINGS:  The lungs are clear.  There are no pleural effusions. No pneumothorax. Normal pulmonary   vasculature  Cardiac size is within normal limits.  There is no acute osseous abnormality.  Breast implants with surface calcifications, unchanged  IMPRESSION:  Clear lungs. Findings unchanged      Assessment /Plan:   83F with PMHx of hypothyroidism, HLD, dementia (AOx1-2 at baseline), depression and orthostatic hypotension presenting for lightheadedness. On exam unable to explain symptoms.         1. Problem/Plan:  Problem: Orthostatic dizziness   - Remains positive on 4/1  - Unable to explain or report symptoms during exam  - Continue Midodrine 5mg PO TID  - Likely vagally mediated and orthostasis  - CXR w/ clear lungs  - CTH w/ No evidence of acute intracranial hemorrhage or midline shift  - [facility]  - Recommend obtaining TTE     2. Problem/Plan:  Problem: Hyperlipidemia   - Continue Atorvastatin 80mg PO daily     3. Problem/Plan:  Problem: DVT ppx  - Currently on hold         Differential diagnosis and plan of care discussed with patient after the evaluation. Counseling on diet, nutritional counseling, weight management, exercise and medication compliance was done.   Advanced care planning/advanced directives discussed with patient/family. DNR status including forceful chest compressions to attempt to restart the heart, ventilator support/artificial breathing, electric shock, artificial nutrition, health care proxy, Molst form all discussed with pt. Pt wishes to consider. More than fifteen minutes spent on discussing advanced directives.     JOI Shearer DO [facility]  Cardiovascular Medicine  [address] [place] [address], [address]  Available for call or text via [employer]  Office [phone]   DATE OF SERVICE: 04-01-24 @ 12:59    CHIEF COMPLAINT:Patient is a 83y old  Female who presents with a chief complaint of Lightheadedness (01 Apr 2024 10:21)      HISTORY OF PRESENT ILLNESS:HPI:  83F with PMHx of hypothyroidism, dementia (AOx1-2 at baseline), depression and orthostatic hypotension presenting for lightheadedness when walking down the stairs. Patient recently admitted several weeks prior here for lightheadedness and pre-syncope. Extensive work up at that time included CT head, CTA H&N, MRI brain, and TTE. 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 Cardiology consulted and there was no intervention. CTA H&N with aneurysm, but per neurosurgery no intervention. Patient found to be orthostatic positive and was started on Midodrine 5 mg TID. Neurology was consulted and they believe her lightheadedness was due to orthostasis and vagally mediated. Patient eventually discharged home (though she was recommended for [facility]). Patient states she lives alone with an aide coming by. She was walking down the stairs when she began to feel lightheaded, but did not pass out. Given symptoms she called EMS who brought her in here. Here patient without complaints such as dizziness. CT head without acute pathology. The ED attempted to ambulate patient three times, but she was unable to.  (31 Mar 2024 16:44)      PAST MEDICAL & SURGICAL HISTORY:  Type 2 diabetes mellitus      No significant past surgical history              MEDICATIONS:  aspirin  chewable 81 milliGRAM(s) Oral daily  midodrine. 5 milliGRAM(s) Oral three times a day        acetaminophen     Tablet .. 650 milliGRAM(s) Oral every 6 hours PRN  melatonin 3 milliGRAM(s) Oral at bedtime  venlafaxine XR. 75 milliGRAM(s) Oral daily      atorvastatin 80 milliGRAM(s) Oral at bedtime  levothyroxine 50 MICROGram(s) Oral daily    cyanocobalamin 1000 MICROGram(s) Oral daily  influenza  Vaccine (HIGH DOSE) 0.7 milliLiter(s) IntraMuscular once  multivitamin/minerals 1 Tablet(s) Oral daily      FAMILY HISTORY:  No pertinent family history in first degree relatives        Non-contributory    SOCIAL HISTORY:    [x] Tobacco  [x] Drugs  [x] Alcohol    Allergies    No Known Allergies    Intolerances    	    REVIEW OF SYSTEMS:  CONSTITUTIONAL: No fever  EYES: No eye pain, visual disturbances, or discharge  ENMT:  No difficulty hearing, tinnitus  NECK: No pain or stiffness  RESPIRATORY: No cough, wheezing,  CARDIOVASCULAR: No chest pain, palpitations, passing out, dizziness, or leg swelling  GASTROINTESTINAL:  No nausea, vomiting, diarrhea or constipation. No melena.  GENITOURINARY: No dysuria, hematuria  NEUROLOGICAL: No stroke like symptoms  SKIN: No burning or lesions   ENDOCRINE: No heat or cold intolerance  MUSCULOSKELETAL: No joint pain or swelling  PSYCHIATRIC: No  anxiety, mood swings  HEME/LYMPH: No bleeding gums  ALLERGY AND IMMUNOLOGIC: No hives or eczema	    All other ROS negative    PHYSICAL EXAM:  T(C): 36.8 (04-01-24 @ 12:48), Max: 36.8 (04-01-24 @ 12:48)  HR: 76 (04-01-24 @ 12:48) (70 - 76)  BP: 176/91 (04-01-24 @ 12:48) (139/58 - 176/91)  RR: 18 (04-01-24 @ 12:48) (14 - 18)  SpO2: 95% (04-01-24 @ 12:48) (95% - 100%)  Wt(kg): --  I&O's Summary      Appearance: Normal	  HEENT:   Normal oral mucosa, EOMI	  Cardiovascular:  S1 S2, No JVD,    Respiratory: Lungs clear to auscultation	  Psychiatry: Alert  Gastrointestinal:  Soft, Non-tender, + BS	  Skin: No rashes   Neurologic: Non-focal  Extremities:  No edema  Vascular: Peripheral pulses palpable    	    	  	  CARDIAC MARKERS:  Labs personally reviewed by me                                  11.4   4.68  )-----------( 216      ( 31 Mar 2024 11:04 )             35.8     03-31    136  |  104  |  33<H>  ----------------------------<  65<L>  4.6   |  19<L>  |  1.83<H>    Ca    9.3      31 Mar 2024 14:34    TPro  7.5  /  Alb  4.3  /  TBili  0.3  /  DBili  x   /  AST  40  /  ALT  16  /  AlkPhos  72  03-31          EKG: Personally reviewed by me - SR HR 76    Radiology: Personally reviewed by me -    Xray Chest 2 Views PA/Lat (03.31.24 @ 10:50)  FINDINGS:  The lungs are clear.  There are no pleural effusions. No pneumothorax. Normal pulmonary   vasculature  Cardiac size is within normal limits.  There is no acute osseous abnormality.  Breast implants with surface calcifications, unchanged  IMPRESSION:  Clear lungs. Findings unchanged      Assessment /Plan:   83F with PMHx of hypothyroidism, HLD, dementia (AOx1-2 at baseline), depression and orthostatic hypotension presenting for lightheadedness. On exam unable to explain symptoms.       1. Problem/Plan:  Problem: Orthostatic dizziness   - Remains positive on 4/1  - Unable to explain or report symptoms during exam  - Continue Midodrine 5mg PO TID  - [facility]  - Recommend obtaining TTE     2. Problem/Plan:  Problem: Hyperlipidemia   - Continue Atorvastatin 80mg PO daily     3. Problem/Plan:  Problem: DVT ppx  - Currently on hold         Differential diagnosis and plan of care discussed with patient after the evaluation. Counseling on diet, nutritional counseling, weight management, exercise and medication compliance was done.   Advanced care planning/advanced directives discussed with patient/family. DNR status including forceful chest compressions to attempt to restart the heart, ventilator support/artificial breathing, electric shock, artificial nutrition, health care proxy, Molst form all discussed with pt. Pt wishes to consider. More than fifteen minutes spent on discussing advanced directives.     JOI Shearer DO [facility]  Cardiovascular Medicine  [address] [place] [address] Dr, [address]  Available for call or text via [employer]  Office [phone]

## 2024-04-01 NOTE — DISCHARGE NOTE PROVIDER - NSDCCPCAREPLAN_GEN_ALL_CORE_FT
PRINCIPAL DISCHARGE DIAGNOSIS  Diagnosis: Near syncope  Assessment and Plan of Treatment: Patient had CTH done in Emergency Department: No evidence of acute intracranial hemorrhage or midline shift.  Follow-up with PCP.        SECONDARY DISCHARGE DIAGNOSES  Diagnosis: Unable to ambulate  Assessment and Plan of Treatment:

## 2024-04-01 NOTE — CONSULT NOTE ADULT - ASSESSMENT
83F with PMHx of hypothyroidism, dementia (AOx1-2 at baseline), depression and orthostatic hypotension presenting for lightheadedness when walking down the stairs. Patient recently admitted several weeks prior here for lightheadedness and pre-syncope. Extensive work up at that time included CT head, CTA H&N, MRI brain, and TTE. 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 Cardiology consulted and there was no intervention. CTA H&N with aneurysm, but per neurosurgery no intervention. Patient found to be orthostatic positive and was started on Midodrine 5 mg TID. Neurology was consulted and they believe her lightheadedness was due to orthostasis and vagally mediated. Patient eventually discharged home (though she was recommended for [facility]). Patient states she lives alone with an aide coming by. She was walking down the stairs when she began to feel lightheaded, but did not pass out. Given symptoms she called EMS who brought her in here. Here patient without complaints such as dizziness. CT head without acute pathology. The ED attempted to ambulate patient three times, but she was unable to.  Last admission:   A1c 5.9   b12 332   TSH WNL. 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 EF 67%   CTH no acute findings. chronic microvascular changes   CTA H/N with R ICA 4.9x3.8mm aneurysms with wide neck 3mm   CD neg   MRI brain with no acute infarct noted. chronic small vessel changes   o/e AAOx2, FELIPE no focal findings. no facial   Orthostatics +   CT 3/31 no changes. no acute findings     Impression   1) AMS and dementia, near baseline   2)  lightheadedness/dizziness   3) low B12   4) ICA aneurysm     - on asa and statin therapy  - check orthostatics.  on midodrine   - c/w b12. was in 300s   - outpatient neurosx for aneurysm   - PT/OT    - telemetry  - PT/OT   - check FS, glucose control <180  - GI/DVT ppx  - Counseling on diet, exercise, and medication adherence was done  - Counseling on smoking cessation and alcohol consumption offered when appropriate.  - Pain assessed and judicious use of narcotics when appropriate was discussed.    - Stroke education given when appropriate.  - Importance of fall prevention discussed.   - Differential diagnosis and plan of care discussed with patient and/or family and primary team  - Thank you for allowing me to participate in the care of this patient. Call with questions.   - outaptient f/u   Alo Zuñiga MD  Vascular Neurology  Office: [phone] 
83F with PMHx of hypothyroidism, dementia (AOx1-2 at baseline), depression and orthostatic hypotension presenting for lightheadedness. Found to have PARVEEN    A/P:  PARVEEN:  Baseline Scr 1.5  Scr on admission 2.0  PARVEEN likely pre-renal  Clinically dehydrated, responding to IVF  Echo 02/24: Normal EF  Urine is bland  Start NaHCO3 150meq/L-75cc/hr for 1L  Monitor I/O  Monitor renal function closely    Acidosis:  non-AG  IV bicarb as suggested above    Orthostatic Hypotension:  on Midodrine  Monitor

## 2024-04-01 NOTE — PHYSICAL THERAPY INITIAL EVALUATION ADULT - NSPTDISCHREC_GEN_A_CORE
Subacute Rehab to improve functional mobility and independence. If pt goes home, recommend Home PT, caregiver assist with all OOB mobility/ADLs, RW, 3:1 commode, and transport/poly fly w/c for safe house entry to encourage energy conservation and reduce fall risk./Sub-acute Rehab

## 2024-04-01 NOTE — DISCHARGE NOTE PROVIDER - HOSPITAL COURSE
HPI:  83F with PMHx of hypothyroidism, dementia (AOx1-2 at baseline), depression and orthostatic hypotension presenting for lightheadedness when walking down the stairs. Patient recently admitted several weeks prior here for lightheadedness and pre-syncope. Extensive work up at that time included CT head, CTA H&N, MRI brain, and TTE. 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 Cardiology consulted and there was no intervention. CTA H&N with aneurysm, but per neurosurgery no intervention. Patient found to be orthostatic positive and was started on Midodrine 5 mg TID. Neurology was consulted and they believe her lightheadedness was due to orthostasis and vagally mediated. Patient eventually discharged home (though she was recommended for [facility]). Patient states she lives alone with an aide coming by. She was walking down the stairs when she began to feel lightheaded, but did not pass out. Given symptoms she called EMS who brought her in here. Here patient without complaints such as dizziness. CT head without acute pathology. The ED attempted to ambulate patient three times, but she was unable to.      Hospital Course:  Patient had CTH head done in emergency department: No evidence of acute intracranial hemorrhage or midline shift.      Active or Pending Issues Requiring Follow-up:  Follow-up with primary care doctor.     Advanced Directives:   [X] Full code  [ ] DNR  [ ] Hospice    Discharge Diagnoses:  Fall

## 2024-04-02 LAB
ANION GAP SERPL CALC-SCNC: 13 MMOL/L — SIGNIFICANT CHANGE UP (ref 5–17)
BUN SERPL-MCNC: 22 MG/DL — SIGNIFICANT CHANGE UP (ref 7–23)
CALCIUM SERPL-MCNC: 9.9 MG/DL — SIGNIFICANT CHANGE UP (ref 8.4–10.5)
CHLORIDE SERPL-SCNC: 99 MMOL/L — SIGNIFICANT CHANGE UP (ref 96–108)
CO2 SERPL-SCNC: 26 MMOL/L — SIGNIFICANT CHANGE UP (ref 22–31)
CREAT SERPL-MCNC: 1.34 MG/DL — HIGH (ref 0.5–1.3)
EGFR: 39 ML/MIN/1.73M2 — LOW
GLUCOSE SERPL-MCNC: 104 MG/DL — HIGH (ref 70–99)
HCT VFR BLD CALC: 34.7 % — SIGNIFICANT CHANGE UP (ref 34.5–45)
HGB BLD-MCNC: 11.2 G/DL — LOW (ref 11.5–15.5)
MCHC RBC-ENTMCNC: 28.4 PG — SIGNIFICANT CHANGE UP (ref 27–34)
MCHC RBC-ENTMCNC: 32.3 GM/DL — SIGNIFICANT CHANGE UP (ref 32–36)
MCV RBC AUTO: 87.8 FL — SIGNIFICANT CHANGE UP (ref 80–100)
NRBC # BLD: 0 /100 WBCS — SIGNIFICANT CHANGE UP (ref 0–0)
PLATELET # BLD AUTO: 213 K/UL — SIGNIFICANT CHANGE UP (ref 150–400)
POTASSIUM SERPL-MCNC: 4.1 MMOL/L — SIGNIFICANT CHANGE UP (ref 3.5–5.3)
POTASSIUM SERPL-SCNC: 4.1 MMOL/L — SIGNIFICANT CHANGE UP (ref 3.5–5.3)
RBC # BLD: 3.95 M/UL — SIGNIFICANT CHANGE UP (ref 3.8–5.2)
RBC # FLD: 15.7 % — HIGH (ref 10.3–14.5)
SODIUM SERPL-SCNC: 138 MMOL/L — SIGNIFICANT CHANGE UP (ref 135–145)
WBC # BLD: 5.97 K/UL — SIGNIFICANT CHANGE UP (ref 3.8–10.5)
WBC # FLD AUTO: 5.97 K/UL — SIGNIFICANT CHANGE UP (ref 3.8–10.5)

## 2024-04-02 PROCEDURE — 95816 EEG AWAKE AND DROWSY: CPT | Mod: 26

## 2024-04-02 RX ORDER — HALOPERIDOL DECANOATE 100 MG/ML
0.25 INJECTION INTRAMUSCULAR ONCE
Refills: 0 | Status: COMPLETED | OUTPATIENT
Start: 2024-04-02 | End: 2024-04-02

## 2024-04-02 RX ADMIN — Medication 1 TABLET(S): at 12:10

## 2024-04-02 RX ADMIN — ATORVASTATIN CALCIUM 80 MILLIGRAM(S): 80 TABLET, FILM COATED ORAL at 21:01

## 2024-04-02 RX ADMIN — Medication 75 MILLIGRAM(S): at 12:10

## 2024-04-02 RX ADMIN — MIDODRINE HYDROCHLORIDE 5 MILLIGRAM(S): 2.5 TABLET ORAL at 17:26

## 2024-04-02 RX ADMIN — HALOPERIDOL DECANOATE 0.25 MILLIGRAM(S): 100 INJECTION INTRAMUSCULAR at 21:02

## 2024-04-02 RX ADMIN — MIDODRINE HYDROCHLORIDE 5 MILLIGRAM(S): 2.5 TABLET ORAL at 10:36

## 2024-04-02 RX ADMIN — Medication 3 MILLIGRAM(S): at 21:01

## 2024-04-02 RX ADMIN — Medication 81 MILLIGRAM(S): at 12:11

## 2024-04-02 RX ADMIN — PREGABALIN 1000 MICROGRAM(S): 225 CAPSULE ORAL at 12:10

## 2024-04-02 RX ADMIN — Medication 50 MICROGRAM(S): at 05:23

## 2024-04-02 NOTE — PROGRESS NOTE ADULT - ASSESSMENT
83F with PMHx of hypothyroidism, dementia (AOx1-2 at baseline), depression and orthostatic hypotension presenting for lightheadedness when walking down the stairs.     Plan:    # Presyncope:  - Likely vagally mediated and orthostasis  - CXR w/ clear lungs  - CTH w/ No evidence of acute intracranial hemorrhage or midline shift  - [facility]  - PT eval  - C/w Midodrine  - Cards following    # PARVEEN:   - Monitor I/O's  - Serial Cr  - Avoid nephrotoxins  - Renally dose medications  - Renal following    # HLD:  - C/w Statin    # Hypothyroidism:  - C/w Synthroid    # Depression:  - C/w Venlafaxine    # GI ppx:  - Bowel regimen prn    # DVT ppx:  - IPC's    DCP - [facility]    [facility]     83F with PMHx of hypothyroidism, dementia (AOx1-2 at baseline), depression and orthostatic hypotension presenting for lightheadedness when walking down the stairs.     Plan:    # Presyncope:  - Likely vagally mediated and orthostasis  - CXR w/ clear lungs  - CTH w/ No evidence of acute intracranial hemorrhage or midline shift  - [facility]  - PT eval -> [facility]  - C/w Midodrine  - Cards recommending TTE  - Cards following    # PARVEEN:   - Monitor I/O's  - Serial Cr  - Avoid nephrotoxins  - Renally dose medications  - Renal following    # HLD:  - C/w Statin    # Hypothyroidism:  - C/w Synthroid    # Depression:  - C/w Venlafaxine    # GI ppx:  - Bowel regimen prn    # DVT ppx:  - IPC's    DCP - [facility]    [facility]

## 2024-04-02 NOTE — PROGRESS NOTE ADULT - ASSESSMENT
83F with PMHx of hypothyroidism, dementia (AOx1-2 at baseline), depression and orthostatic hypotension presenting for lightheadedness. Found to have PARVEEN    A/P:  PARVEEN:  Baseline Scr 1.5  Scr on admission 2.0  PARVEEN likely pre-renal  Clinically dehydrated, responding to IVF  Echo 02/24: Normal EF  Urine is bland  Renal function stable today  Monitor I/O  Monitor renal function closely    Acidosis:  non-AG  improved  monitor serum co2    Orthostatic Hypotension:  on Midodrine  Monitor

## 2024-04-02 NOTE — PROGRESS NOTE ADULT - SUBJECTIVE AND OBJECTIVE BOX
SUBJECTIVE / OVERNIGHT EVENTS:          --------------------------------------------------------------------------------------------  LABS:                        11.4   4.68  )-----------( 216      ( 31 Mar 2024 11:04 )             35.8     03-31    136  |  104  |  33<H>  ----------------------------<  65<L>  4.6   |  19<L>  |  1.83<H>    Ca    9.3      31 Mar 2024 14:34    TPro  7.5  /  Alb  4.3  /  TBili  0.3  /  DBili  x   /  AST  40  /  ALT  16  /  AlkPhos  72  03-31    PT/INR - ( 31 Mar 2024 11:04 )   PT: 10.6 sec;   INR: 0.96 ratio         PTT - ( 31 Mar 2024 11:04 )  PTT:27.4 sec  CAPILLARY BLOOD GLUCOSE            Urinalysis Basic - ( 31 Mar 2024 14:34 )    Color: x / Appearance: x / SG: x / pH: x  Gluc: 65 mg/dL / Ketone: x  / Bili: x / Urobili: x   Blood: x / Protein: x / Nitrite: x   Leuk Esterase: x / RBC: x / WBC x   Sq Epi: x / Non Sq Epi: x / Bacteria: x        RADIOLOGY & ADDITIONAL TESTS:    Imaging Personally Reviewed:  [x] YES  [ ] NO    Consultant(s) Notes Reviewed:  [x] YES  [ ] NO    MEDICATIONS  (STANDING):  aspirin  chewable 81 milliGRAM(s) Oral daily  atorvastatin 80 milliGRAM(s) Oral at bedtime  cyanocobalamin 1000 MICROGram(s) Oral daily  influenza  Vaccine (HIGH DOSE) 0.7 milliLiter(s) IntraMuscular once  levothyroxine 50 MICROGram(s) Oral daily  melatonin 3 milliGRAM(s) Oral at bedtime  midodrine. 5 milliGRAM(s) Oral three times a day  multivitamin/minerals 1 Tablet(s) Oral daily  venlafaxine XR. 75 milliGRAM(s) Oral daily    MEDICATIONS  (PRN):  acetaminophen     Tablet .. 650 milliGRAM(s) Oral every 6 hours PRN Temp greater or equal to 38C (100.4F), Mild Pain (1 - 3), Moderate Pain (4 - 6), Severe Pain (7 - 10)      Care Discussed with Consultants/Other Providers [x] YES  [ ] NO    Vital Signs Last 24 Hrs  T(C): 36.3 (02 Apr 2024 05:08), Max: 36.8 (01 Apr 2024 12:48)  T(F): 97.3 (02 Apr 2024 05:08), Max: 98.3 (01 Apr 2024 12:48)  HR: 81 (02 Apr 2024 05:08) (76 - 86)  BP: 171/72 (02 Apr 2024 05:08) (143/58 - 185/71)  BP(mean): --  RR: 18 (02 Apr 2024 05:08) (14 - 18)  SpO2: 99% (02 Apr 2024 05:08) (95% - 100%)    Parameters below as of 02 Apr 2024 05:08  Patient On (Oxygen Delivery Method): room air      I&O's Summary    01 Apr 2024 07:01  -  02 Apr 2024 05:43  --------------------------------------------------------  IN: 0 mL / OUT: 150 mL / NET: -150 mL    PHYSICAL EXAM:  GENERAL: NAD  HEAD:  Atraumatic, Normocephalic  EYES: EOMI, PERRLA, conjunctiva and sclera clear  NECK: Supple, No JVD  CHEST/LUNG: Diminished bilaterally; No wheeze  HEART: Regular rate and rhythm; No murmurs, rubs, or gallops  ABDOMEN: Soft, Nontender, Nondistended; Bowel sounds present  NEURO: AAOx1, no focal weakness, 5/5 b/l extremity strength, b/l knee no arthritis, no effusion   EXTREMITIES:  2+ Peripheral Pulses, No clubbing, cyanosis, or edema  SKIN: No rashes or lesions       SUBJECTIVE / OVERNIGHT EVENTS:    patient seen and examined  resting comfortably in bed  no events ovenright    --------------------------------------------------------------------------------------------  LABS:                        11.4   4.68  )-----------( 216      ( 31 Mar 2024 11:04 )             35.8     03-31    136  |  104  |  33<H>  ----------------------------<  65<L>  4.6   |  19<L>  |  1.83<H>    Ca    9.3      31 Mar 2024 14:34    TPro  7.5  /  Alb  4.3  /  TBili  0.3  /  DBili  x   /  AST  40  /  ALT  16  /  AlkPhos  72  03-31    PT/INR - ( 31 Mar 2024 11:04 )   PT: 10.6 sec;   INR: 0.96 ratio         PTT - ( 31 Mar 2024 11:04 )  PTT:27.4 sec  CAPILLARY BLOOD GLUCOSE            Urinalysis Basic - ( 31 Mar 2024 14:34 )    Color: x / Appearance: x / SG: x / pH: x  Gluc: 65 mg/dL / Ketone: x  / Bili: x / Urobili: x   Blood: x / Protein: x / Nitrite: x   Leuk Esterase: x / RBC: x / WBC x   Sq Epi: x / Non Sq Epi: x / Bacteria: x        RADIOLOGY & ADDITIONAL TESTS:    Imaging Personally Reviewed:  [x] YES  [ ] NO    Consultant(s) Notes Reviewed:  [x] YES  [ ] NO    MEDICATIONS  (STANDING):  aspirin  chewable 81 milliGRAM(s) Oral daily  atorvastatin 80 milliGRAM(s) Oral at bedtime  cyanocobalamin 1000 MICROGram(s) Oral daily  influenza  Vaccine (HIGH DOSE) 0.7 milliLiter(s) IntraMuscular once  levothyroxine 50 MICROGram(s) Oral daily  melatonin 3 milliGRAM(s) Oral at bedtime  midodrine. 5 milliGRAM(s) Oral three times a day  multivitamin/minerals 1 Tablet(s) Oral daily  venlafaxine XR. 75 milliGRAM(s) Oral daily    MEDICATIONS  (PRN):  acetaminophen     Tablet .. 650 milliGRAM(s) Oral every 6 hours PRN Temp greater or equal to 38C (100.4F), Mild Pain (1 - 3), Moderate Pain (4 - 6), Severe Pain (7 - 10)      Care Discussed with Consultants/Other Providers [x] YES  [ ] NO    Vital Signs Last 24 Hrs  T(C): 36.3 (02 Apr 2024 05:08), Max: 36.8 (01 Apr 2024 12:48)  T(F): 97.3 (02 Apr 2024 05:08), Max: 98.3 (01 Apr 2024 12:48)  HR: 81 (02 Apr 2024 05:08) (76 - 86)  BP: 171/72 (02 Apr 2024 05:08) (143/58 - 185/71)  BP(mean): --  RR: 18 (02 Apr 2024 05:08) (14 - 18)  SpO2: 99% (02 Apr 2024 05:08) (95% - 100%)    Parameters below as of 02 Apr 2024 05:08  Patient On (Oxygen Delivery Method): room air      I&O's Summary    01 Apr 2024 07:01  -  02 Apr 2024 05:43  --------------------------------------------------------  IN: 0 mL / OUT: 150 mL / NET: -150 mL    PHYSICAL EXAM:  GENERAL: NAD  HEAD:  Atraumatic, Normocephalic  EYES: EOMI, PERRLA, conjunctiva and sclera clear  NECK: Supple, No JVD  CHEST/LUNG: Diminished bilaterally; No wheeze  HEART: Regular rate and rhythm; No murmurs, rubs, or gallops  ABDOMEN: Soft, Nontender, Nondistended; Bowel sounds present  NEURO: AAOx1, no focal weakness, 5/5 b/l extremity strength, b/l knee no arthritis, no effusion   EXTREMITIES:  2+ Peripheral Pulses, No clubbing, cyanosis, or edema  SKIN: No rashes or lesions

## 2024-04-02 NOTE — EEG REPORT - NS EEG TEXT BOX
REPORT OF ROUTINE EEG WITH VIDEO    [facility]: [address], [address] [place] [address], Phone: [phone]  [facility]: [address] [place] [address], [place] [address], Phone: [phone]  Office: [address] [place] [address], [place] [address], [place] [address], Phone: [phone]    Patient Name: Bisi Jose    Age: 83 year  : 1940    EEG #: [id number]  Study Date: 2024   Start Time: 2:35:51 PM     Study Duration: 21.8 min  		    Technical Information:					  On Instrument: [id number]  Placement and Labeling of Electrodes:  The EEG was performed utilizing 20 channels referential EEG connections (coronal over temporal over parasagittal montage) using all standard 10-20 electrode placements with EKG.  Recording was at a sampling rate of 256 samples per second per channel.  Time synchronized digital video recording was done simultaneously with EEG recording.  A low light infrared camera was used for low light recording.  Rui and seizure detection algorithms were utilized.  CSA Technical Component:  Quantitative EEG analysis using a separate Compressed Spectral Array (CSA) software package was conducted in real-time and run at bedside after set up by the technician, digitally displaying the power of electrographic frequencies included in the 1-30Hz band using a graded color map.  This data was reviewed and interpreted independently, and is reported in a separate section below.    History:  - 83 year old Female with history of tremor and syncope is here to rule out seizures      Medication  Melatonin    Lipitor    Effexor    Synthroid    Tylenol    Aspirin      Study Interpretation:    FINDINGS:  The background was continuous, spontaneously variable and reactive.  During wakefulness, the posteriorly dominant rhythm consisted of symmetric, poorly modulated up to 9 Hz activity, with an amplitude to 30 uV, that attenuated to eye opening.  Low amplitude central beta was noted in wakefulness.    Background Slowing:  Generalized slowing: present. Background is consisting of occasional diffuse delta theta slowing    Focal slowing: none was present.    Sleep Background:  -Drowsiness was characterized by fragmentation, attenuation, and slowing of the background activity.    -Stage II sleep transients were not recorded.    Non-epileptiform activity:  None.    Epileptiform Activity:   No epileptiform discharges were present.    Events:  No clinical events were recorded.  No seizures were recorded.    Activation Procedures:   -Hyperventilation was not performed.    -Photic stimulation was not performed.    Artifacts:  Intermittent myogenic and movement artifacts were noted.    ECG:  No significant abnormality noted    EEG Classification / Summary:    Abnormal EEG in the awake, and drowsy states.    Clinical Impression:    Mild nonspecific diffuse or multifocal cerebral dysfunction.     No epileptiform pattern or seizure recorded.    ________________________________________    LUIS MIGUEL Loco  Attending Physician, [facility]    ------------------------------------  EEG Reading Room: [phone]  On Call Service After Hours: [phone]

## 2024-04-02 NOTE — PROGRESS NOTE ADULT - SUBJECTIVE AND OBJECTIVE BOX
Neurology     S: patient seen. care giver also at bedsdie       Medications: MEDICATIONS  (STANDING):  aspirin  chewable 81 milliGRAM(s) Oral daily  atorvastatin 80 milliGRAM(s) Oral at bedtime  cyanocobalamin 1000 MICROGram(s) Oral daily  influenza  Vaccine (HIGH DOSE) 0.7 milliLiter(s) IntraMuscular once  levothyroxine 50 MICROGram(s) Oral daily  melatonin 3 milliGRAM(s) Oral at bedtime  midodrine. 5 milliGRAM(s) Oral three times a day  multivitamin/minerals 1 Tablet(s) Oral daily  venlafaxine XR. 75 milliGRAM(s) Oral daily    MEDICATIONS  (PRN):  acetaminophen     Tablet .. 650 milliGRAM(s) Oral every 6 hours PRN Temp greater or equal to 38C (100.4F), Mild Pain (1 - 3), Moderate Pain (4 - 6), Severe Pain (7 - 10)       Vitals:  Vital Signs Last 24 Hrs  T(C): 36.3 (02 Apr 2024 05:08), Max: 36.8 (01 Apr 2024 12:48)  T(F): 97.3 (02 Apr 2024 05:08), Max: 98.3 (01 Apr 2024 12:48)  HR: 81 (02 Apr 2024 05:08) (76 - 86)  BP: 171/72 (02 Apr 2024 05:08) (143/58 - 185/71)  BP(mean): --  RR: 18 (02 Apr 2024 05:08) (18 - 18)  SpO2: 99% (02 Apr 2024 05:08) (95% - 100%)    Parameters below as of 02 Apr 2024 05:08  Patient On (Oxygen Delivery Method): room air           Orthostatic VS    04-01-24 @ 08:45  Lying BP: Orthostatic BP (Lying Systolic): 169/Orthostatic BP (Lying Diastolic (mm Hg)): 69 HR: Orthostatic Pulse (Heart Rate (beats/min)): 70   Sitting BP: Orthostatic BP (Sitting Systolic): 155/Orthostatic BP (Sitting Diastolic (mm Hg)): 65 HR: Orthostatic Pulse (Heart Rate (beats/min)): 75  Standing BP: Orthostatic BP (Standing Systolic): 150/-- HR: Orthostatic Pulse (Heart Rate (beats/min)): 89  Site: Orthostatic BP/Pulse (Site): upper right arm   Mode: Orthostatic BP/ Pulse (Mode): electronic    04-01-24 @ 05:49  Lying BP: Orthostatic BP (Lying Systolic): 167/Orthostatic BP (Lying Diastolic (mm Hg)): 85 HR: Orthostatic Pulse (Heart Rate (beats/min)): 74   Sitting BP: Orthostatic BP (Sitting Systolic): 166/Orthostatic BP (Sitting Diastolic (mm Hg)): 62 HR: Orthostatic Pulse (Heart Rate (beats/min)): 83  Standing BP: Orthostatic BP (Standing Systolic): 142/Orthostatic BP (Standing Diastolic (mm Hg)): 63 HR: Orthostatic Pulse (Heart Rate (beats/min)): 88  Site: Orthostatic BP/Pulse (Site): upper right arm   Mode: Orthostatic BP/ Pulse (Mode): electronic      General Exam:   General Appearance: Appropriately dressed and in no acute distress       Head: Normocephalic, atraumatic and no dysmorphic features  Ear, Nose, and Throat: Moist mucous membranes  CVS: S1S2+  Resp: No SOB, no wheeze or rhonchi  GI: soft NT/ND  Extremities: No edema or cyanosis  Skin: No bruises or rashes     Neurological Exam:  Neurological Exam:  Mental Status: Awake, alert and oriented x 2  Able to follow simple and complex verbal commands. Able to name and repeat. fluent speech. No obvious aphasia or dysarthria noted.   Cranial Nerves: PERRL, EOMI, VFFC, sensation V1-V3 intact,  no obvious facial asymmetry , equal elevation of palate, scm/trap 5/5, tongue is midline on protrusion. no obvious papilledema on fundoscopic exam. Hearing is grossly intact.   Motor: Normal bulk, tone and strength throughout. Fine finger movements were intact and symmetric. generalized wekaness   Sensation: Intact to light touch and pinprick throughout. no right/left confusion. no extinction to tactile on DSS.   Reflexes: 1+ throughout at biceps, brachioradialis, triceps, patellars and ankles bilaterally and equal. No clonus. R toe and L toe were both downgoing.  Coordination: No dysmetria on FNF or HKS   Gait: deferred         Data/Labs/Imaging which I personally reviewed.            LABS:                          11.2   5.97  )-----------( 213      ( 02 Apr 2024 07:10 )             34.7     04-02    138  |  99  |  22  ----------------------------<  104<H>  4.1   |  26  |  1.34<H>    Ca    9.9      02 Apr 2024 07:07    TPro  7.5  /  Alb  4.3  /  TBili  0.3  /  DBili  x   /  AST  40  /  ALT  16  /  AlkPhos  72  03-31    LIVER FUNCTIONS - ( 31 Mar 2024 11:04 )  Alb: 4.3 g/dL / Pro: 7.5 g/dL / ALK PHOS: 72 U/L / ALT: 16 U/L / AST: 40 U/L / GGT: x           PT/INR - ( 31 Mar 2024 11:04 )   PT: 10.6 sec;   INR: 0.96 ratio         PTT - ( 31 Mar 2024 11:04 )  PTT:27.4 sec  Urinalysis Basic - ( 02 Apr 2024 07:07 )    Color: x / Appearance: x / SG: x / pH: x  Gluc: 104 mg/dL / Ketone: x  / Bili: x / Urobili: x   Blood: x / Protein: x / Nitrite: x   Leuk Esterase: x / RBC: x / WBC x   Sq Epi: x / Non Sq Epi: x / Bacteria: x          < from: CT Head No Cont (03.31.24 @ 14:02) >    ACC: [id number] EXAM:  CT BRAIN   ORDERED BY: PETTY ALTAMIRANO     PROCEDURE DATE:  03/31/2024          INTERPRETATION:  EXAM: CT HEAD WITHOUT INTRAVENOUS CONTRAST    HISTORY: Syncopal episode, AAO x1    TECHNIQUE: Multiple axial images were obtained from the skull base to the   vertex. Sagittal and coronal reformatted images were obtained from the   axial data set. The images were reviewed in brain and bone windows.    COMPARISON: MRI of the brain November 29, 2024, CT of the head February 28, 2024    FINDINGS:    No acute intracranial hemorrhage. Areas of decreased attenuation   throughout the deep and periventricular white matter, compatible with   chronic small vessel disease. Parenchymal volume loss resulting in a ex   vacuo dilatation appearance of the bilateral ventricles. The extra-axial   spaces and basal cisterns are within normal limits. No midline shift or   mass effect present.    The cranial cervical junction is within normal limits. The sella is not   expanded. No depressed calvarial fracture. Left ethmoid air cell   polyp/mucous retention cyst.. The visualized mastoid air cells are well   aerated. The visualized orbits are status post cataract surgery.    IMPRESSION:    1.  No evidence of acute intracranial hemorrhageor midline shift.  2.  Chronic small vessel disease. If there is continued concern for acute   neurologic compromise, recommend MRI of the brain for further evaluation.      --- End of Report ---           TANISHA MCDOWELL MD; Resident Radiologist  This document has been electronically signed.  MIKE EATON MD; Attending Radiologist  This document has been electronically signed. Mar 31 2024  2:13PM    < end of copied text >

## 2024-04-02 NOTE — PROGRESS NOTE ADULT - ASSESSMENT
83F with PMHx of hypothyroidism, dementia (AOx1-2 at baseline), depression and orthostatic hypotension presenting for lightheadedness when walking down the stairs. Patient recently admitted several weeks prior here for lightheadedness and pre-syncope. Extensive work up at that time included CT head, CTA H&N, MRI brain, and TTE. 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 Cardiology consulted and there was no intervention. CTA H&N with aneurysm, but per neurosurgery no intervention. Patient found to be orthostatic positive and was started on Midodrine 5 mg TID. Neurology was consulted and they believe her lightheadedness was due to orthostasis and vagally mediated. Patient eventually discharged home (though she was recommended for [facility]). Patient states she lives alone with an aide coming by. She was walking down the stairs when she began to feel lightheaded, but did not pass out. Given symptoms she called EMS who brought her in here. Here patient without complaints such as dizziness. CT head without acute pathology. The ED attempted to ambulate patient three times, but she was unable to.  Last admission:   A1c 5.9   b12 332   TSH WNL. 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 EF 67%   CTH no acute findings. chronic microvascular changes   CTA H/N with R ICA 4.9x3.8mm aneurysms with wide neck 3mm   CD neg   MRI brain with no acute infarct noted. chronic small vessel changes   o/e AAOx2, FELIPE no focal findings. no facial   Orthostatics +   CT 3/31 no changes. no acute findings     Impression   1) AMS and dementia, near baseline   2)  lightheadedness/dizziness   3) low B12   4) ICA aneurysm     - on asa and statin therapy  - check orthostatics.  on midodrine   - c/w b12. was in 300s   - outpatient neurosx for aneurysm   - check routine EEG ; care giver states RUE shaking   - if no improvement MRI braine   - telemetry  - PT/OT   - check FS, glucose control <180  - GI/DVT ppx  - Thank you for allowing me to participate in the care of this patient. Call with questions.   - spoke [facility] care giver at [facility] 4/2  - outaptient f/u   - plan eventually for rehab   Alo Zuñiga MD  Vascular Neurology  Office: [phone]

## 2024-04-02 NOTE — PROGRESS NOTE ADULT - SUBJECTIVE AND OBJECTIVE BOX
[facility]   MD SAAD HERNANDEZ MD RUORU WONG, PA    TEL:  FROM 9 AM to 5 PM ---OFFICE: [phone]  AVAILABLE ON TEAMS    FROM 5 PM - 9 AM PLEASE CALL ANSWERING SERVICE: [phone]    RENAL FOLLOW UP NOTE--Date of Service 04-02-24 @ 09:11  --------------------------------------------------------------------------------  HPI:      Pt seen and examined at bedside.   sitting up having breakfast    PAST HISTORY  --------------------------------------------------------------------------------  No significant changes to PMH, PSH, FHx, SHx, unless otherwise noted    ALLERGIES & MEDICATIONS  --------------------------------------------------------------------------------  Allergies    No Known Allergies    Intolerances      Standing Inpatient Medications  aspirin  chewable 81 milliGRAM(s) Oral daily  atorvastatin 80 milliGRAM(s) Oral at bedtime  cyanocobalamin 1000 MICROGram(s) Oral daily  influenza  Vaccine (HIGH DOSE) 0.7 milliLiter(s) IntraMuscular once  levothyroxine 50 MICROGram(s) Oral daily  melatonin 3 milliGRAM(s) Oral at bedtime  midodrine. 5 milliGRAM(s) Oral three times a day  multivitamin/minerals 1 Tablet(s) Oral daily  venlafaxine XR. 75 milliGRAM(s) Oral daily    PRN Inpatient Medications  acetaminophen     Tablet .. 650 milliGRAM(s) Oral every 6 hours PRN      REVIEW OF SYSTEMS  --------------------------------------------------------------------------------  General: no fever  MSK: no edema     VITALS/PHYSICAL EXAM  --------------------------------------------------------------------------------  T(C): 36.3 (04-02-24 @ 05:08), Max: 36.8 (04-01-24 @ 12:48)  HR: 81 (04-02-24 @ 05:08) (76 - 86)  BP: 171/72 (04-02-24 @ 05:08) (143/58 - 185/71)  RR: 18 (04-02-24 @ 05:08) (18 - 18)  SpO2: 99% (04-02-24 @ 05:08) (95% - 100%)  Wt(kg): --    Weight (kg): 45.4 (03-31-24 @ 10:07)      04-01-24 @ 07:01  -  04-02-24 @ 07:00  --------------------------------------------------------  IN: 0 mL / OUT: 150 mL / NET: -150 mL      Physical Exam:  	Gen: NAD  	HEENT: MMM  	Pulm: CTA B/L  	CV: S1S2  	Abd: Soft, +BS  	Ext: No LE edema B/L                      Neuro: Awake   	Skin: Warm and Dry   	Vascular access: NO HD catheter            no  chuyita  LABS/STUDIES  --------------------------------------------------------------------------------              11.2   5.97  >-----------<  213      [04-02-24 @ 07:10]              34.7     138  |  99  |  22  ----------------------------<  104      [04-02-24 @ 07:07]  4.1   |  26  |  1.34        Ca     9.9     [04-02-24 @ 07:07]    TPro  7.5  /  Alb  4.3  /  TBili  0.3  /  DBili  x   /  AST  40  /  ALT  16  /  AlkPhos  72  [03-31-24 @ 11:04]    PT/INR: PT 10.6 , INR 0.96       [03-31-24 @ 11:04]  PTT: 27.4       [03-31-24 @ 11:04]      Creatinine Trend:  SCr 1.34 [04-02 @ 07:07]  SCr 1.83 [03-31 @ 14:34]  SCr 2.04 [03-31 @ 11:04]  SCr 1.55 [03-04 @ 06:30]    Urinalysis - [04-02-24 @ 07:07]      Color  / Appearance  / SG  / pH       Gluc 104 / Ketone   / Bili  / Urobili        Blood  / Protein  / Leuk Est  / Nitrite       RBC  / WBC  / Hyaline  / Gran  / Sq Epi  / Non Sq Epi  / Bacteria       Vitamin D (25OH) 24.1      [02-29-24 @ 07:51]  TSH 0.50      [02-28-24 @ 12:16]  Lipid: chol 196, TG 70, HDL 65, LDL --      [02-29-24 @ 07:51]

## 2024-04-02 NOTE — PROGRESS NOTE ADULT - SUBJECTIVE AND OBJECTIVE BOX
DATE OF SERVICE: 04-02-24 @ 20:11    Patient is a 83y old  Female who presents with a chief complaint of Lightheadedness (02 Apr 2024 10:53)      INTERVAL HISTORY: In no acute distress.     REVIEW OF SYSTEMS:  CONSTITUTIONAL: No weakness  EYES/ENT: No visual changes;  No throat pain   NECK: No pain or stiffness  RESPIRATORY: No cough, wheezing; No shortness of breath  CARDIOVASCULAR: No chest pain or palpitations  GASTROINTESTINAL: No abdominal  pain. No nausea, vomiting, or hematemesis  GENITOURINARY: No dysuria, frequency or hematuria  NEUROLOGICAL: No stroke like symptoms  SKIN: No rashes    	  MEDICATIONS:  midodrine. 5 milliGRAM(s) Oral three times a day        PHYSICAL EXAM:  T(C): 36.6 (04-02-24 @ 20:01), Max: 37.1 (04-02-24 @ 14:37)  HR: 75 (04-02-24 @ 20:01) (74 - 83)  BP: 140/60 (04-02-24 @ 20:01) (140/60 - 171/72)  RR: 18 (04-02-24 @ 20:01) (18 - 18)  SpO2: 100% (04-02-24 @ 20:01) (95% - 100%)  Wt(kg): --  I&O's Summary    01 Apr 2024 07:01  -  02 Apr 2024 07:00  --------------------------------------------------------  IN: 0 mL / OUT: 150 mL / NET: -150 mL    02 Apr 2024 07:01  -  02 Apr 2024 20:11  --------------------------------------------------------  IN: 720 mL / OUT: 300 mL / NET: 420 mL          Appearance: In no distress	  HEENT:    PERRL, EOMI	  Cardiovascular:  S1 S2, No JVD  Respiratory: Lungs clear to auscultation	  Gastrointestinal:  Soft, Non-tender, + BS	  Vascularature:  No edema of LE  Psychiatric: Appropriate affect   Neuro: no acute focal deficits                               11.2   5.97  )-----------( 213      ( 02 Apr 2024 07:10 )             34.7     04-02    138  |  99  |  22  ----------------------------<  104<H>  4.1   |  26  |  1.34<H>    Ca    9.9      02 Apr 2024 07:07          Labs personally reviewed      ASSESSMENT/PLAN: 	  83F with PMHx of hypothyroidism, HLD, dementia (AOx1-2 at baseline), depression and orthostatic hypotension presenting for lightheadedness. On exam unable to explain symptoms.       1. Problem/Plan:  Problem: Orthostatic dizziness   - Remains positive on 4/1  - Unable to explain or report symptoms during exam  - Continue Midodrine 5mg PO TID  - Trend Orthos  - Recommend obtaining TTE     2. Problem/Plan:  Problem: Hyperlipidemia   - Continue Atorvastatin 80mg PO daily     3. Problem/Plan:  Problem: DVT ppx  - Currently on hold     PRISCILLA Ngo-KRISTA Zepeda DO [facility]  Cardiovascular Medicine  [address] [place] [address], [address]  Available through call or text on [employer]  Office: [phone]   No

## 2024-04-03 ENCOUNTER — RESULT REVIEW (OUTPATIENT)
Age: 84
End: 2024-04-03

## 2024-04-03 LAB
ANION GAP SERPL CALC-SCNC: 14 MMOL/L — SIGNIFICANT CHANGE UP (ref 5–17)
BUN SERPL-MCNC: 18 MG/DL — SIGNIFICANT CHANGE UP (ref 7–23)
CALCIUM SERPL-MCNC: 9.7 MG/DL — SIGNIFICANT CHANGE UP (ref 8.4–10.5)
CHLORIDE SERPL-SCNC: 98 MMOL/L — SIGNIFICANT CHANGE UP (ref 96–108)
CO2 SERPL-SCNC: 27 MMOL/L — SIGNIFICANT CHANGE UP (ref 22–31)
CREAT SERPL-MCNC: 1.32 MG/DL — HIGH (ref 0.5–1.3)
EGFR: 40 ML/MIN/1.73M2 — LOW
GLUCOSE SERPL-MCNC: 79 MG/DL — SIGNIFICANT CHANGE UP (ref 70–99)
HCT VFR BLD CALC: 39 % — SIGNIFICANT CHANGE UP (ref 34.5–45)
HGB BLD-MCNC: 12.4 G/DL — SIGNIFICANT CHANGE UP (ref 11.5–15.5)
MCHC RBC-ENTMCNC: 28 PG — SIGNIFICANT CHANGE UP (ref 27–34)
MCHC RBC-ENTMCNC: 31.8 GM/DL — LOW (ref 32–36)
MCV RBC AUTO: 88 FL — SIGNIFICANT CHANGE UP (ref 80–100)
NRBC # BLD: 0 /100 WBCS — SIGNIFICANT CHANGE UP (ref 0–0)
PLATELET # BLD AUTO: 233 K/UL — SIGNIFICANT CHANGE UP (ref 150–400)
POTASSIUM SERPL-MCNC: 4.2 MMOL/L — SIGNIFICANT CHANGE UP (ref 3.5–5.3)
POTASSIUM SERPL-SCNC: 4.2 MMOL/L — SIGNIFICANT CHANGE UP (ref 3.5–5.3)
RBC # BLD: 4.43 M/UL — SIGNIFICANT CHANGE UP (ref 3.8–5.2)
RBC # FLD: 15.5 % — HIGH (ref 10.3–14.5)
SODIUM SERPL-SCNC: 139 MMOL/L — SIGNIFICANT CHANGE UP (ref 135–145)
WBC # BLD: 5.73 K/UL — SIGNIFICANT CHANGE UP (ref 3.8–10.5)
WBC # FLD AUTO: 5.73 K/UL — SIGNIFICANT CHANGE UP (ref 3.8–10.5)

## 2024-04-03 PROCEDURE — 93306 TTE W/DOPPLER COMPLETE: CPT | Mod: 26

## 2024-04-03 RX ADMIN — Medication 1 TABLET(S): at 12:57

## 2024-04-03 RX ADMIN — Medication 3 MILLIGRAM(S): at 21:35

## 2024-04-03 RX ADMIN — MIDODRINE HYDROCHLORIDE 5 MILLIGRAM(S): 2.5 TABLET ORAL at 06:44

## 2024-04-03 RX ADMIN — MIDODRINE HYDROCHLORIDE 5 MILLIGRAM(S): 2.5 TABLET ORAL at 17:26

## 2024-04-03 RX ADMIN — ATORVASTATIN CALCIUM 80 MILLIGRAM(S): 80 TABLET, FILM COATED ORAL at 21:35

## 2024-04-03 RX ADMIN — PREGABALIN 1000 MICROGRAM(S): 225 CAPSULE ORAL at 12:57

## 2024-04-03 RX ADMIN — MIDODRINE HYDROCHLORIDE 5 MILLIGRAM(S): 2.5 TABLET ORAL at 12:57

## 2024-04-03 RX ADMIN — Medication 50 MICROGRAM(S): at 06:44

## 2024-04-03 RX ADMIN — Medication 81 MILLIGRAM(S): at 12:57

## 2024-04-03 RX ADMIN — Medication 75 MILLIGRAM(S): at 12:57

## 2024-04-03 NOTE — DIETITIAN INITIAL EVALUATION ADULT - ORAL NUTRITION SUPPLEMENTS
RD to continue with Mighty Shakes (provides 6 g PRO, 220 kcal per serving) 2x/day and Magic Cup (per serving provides 9 g PRO, 290 kcal) 1x/day to optimize protein/caloric intake.  RD to add Magic Cup (per serving provides 9 g PRO, 290 kcal) 2x/day to optimize protein/caloric intake.

## 2024-04-03 NOTE — DIETITIAN INITIAL EVALUATION ADULT - ENERGY INTAKE
- Per team, pt with poor appetite/PO intake in-house, consuming <50% of meals. RD to continue with Mighty Shakes (provides 6 g PRO, 220 kcal per serving) 2x/day and Magic Cup (per serving provides 9 g PRO, 290 kcal) 1x/day to optimize protein/caloric intake.  Poor (<50%) - Per team, pt with poor appetite/PO intake in-house, consuming <25% of meals. RD to add Magic Cup (per serving provides 9 g PRO, 290 kcal) 2x/day to optimize protein/caloric intake; obtained food preferences, RD to honor as able. Consider addressing pt's/family's nutrition GOC regarding long term nutrition support as able/appropriate.

## 2024-04-03 NOTE — DIETITIAN INITIAL EVALUATION ADULT - PERTINENT MEDS FT
MEDICATIONS  (STANDING):  aspirin  chewable 81 milliGRAM(s) Oral daily  atorvastatin 80 milliGRAM(s) Oral at bedtime  cyanocobalamin 1000 MICROGram(s) Oral daily  influenza  Vaccine (HIGH DOSE) 0.7 milliLiter(s) IntraMuscular once  levothyroxine 50 MICROGram(s) Oral daily  melatonin 3 milliGRAM(s) Oral at bedtime  midodrine. 5 milliGRAM(s) Oral three times a day  multivitamin/minerals 1 Tablet(s) Oral daily  venlafaxine XR. 75 milliGRAM(s) Oral daily    MEDICATIONS  (PRN):  acetaminophen     Tablet .. 650 milliGRAM(s) Oral every 6 hours PRN Temp greater or equal to 38C (100.4F), Mild Pain (1 - 3), Moderate Pain (4 - 6), Severe Pain (7 - 10)

## 2024-04-03 NOTE — PROGRESS NOTE ADULT - ASSESSMENT
83F with PMHx of hypothyroidism, dementia (AOx1-2 at baseline), depression and orthostatic hypotension presenting for lightheadedness when walking down the stairs.     Plan:    # Presyncope:  - Likely vagally mediated and orthostasis  - CXR w/ clear lungs  - CTH w/ No evidence of acute intracranial hemorrhage or midline shift  - EEG with no seizure activity   - Trend Orthos  - PT eval -> ROSEANNA  - C/w Midodrine  - Cards recommending TTE - pending  - Cards following    # PARVEEN:   - Monitor I/O's  - Serial Cr  - Avoid nephrotoxins  - Renally dose medications  - Renal following    # HLD:  - C/w Statin    # Hypothyroidism:  - C/w Synthroid    # Depression:  - C/w Venlafaxine    # GI ppx:  - Bowel regimen prn    # DVT ppx:  - IPC's    DCP - [facility]    [employer]  [phone]

## 2024-04-03 NOTE — DIETITIAN INITIAL EVALUATION ADULT - ORAL INTAKE PTA/DIET HISTORY
Subjective dietary hx obtained from Subjective dietary hx obtained from [facility]; pt PTA with poor appetite/PO intake; consumes 2-3x small meals/day, ~25-50% of each. Pt primarily likes fried chicken sandwiches, ice cream, broth. Dislikes protein supplementation; no micronutrient supplement. no known food allergies nor food intolerances reported. Reports pt does not follow any therapeutic diets. No hx of chewing/swallowing difficulties PTA.

## 2024-04-03 NOTE — DIETITIAN INITIAL EVALUATION ADULT - NSFNSGIIOFT_GEN_A_CORE
I&O's Detail    02 Apr 2024 07:01  -  03 Apr 2024 07:00  --------------------------------------------------------  IN:    Oral Fluid: 720 mL  Total IN: 720 mL    OUT:    Voided (mL): 300 mL  Total OUT: 300 mL    Total NET: 420 mL       Used current daily wt of 39.6 kg (4/3/24) for anthropometrics above; RD will continue to update as able/appropriate.     I&O's Detail    02 Apr 2024 07:01  -  03 Apr 2024 07:00  --------------------------------------------------------  IN:    Oral Fluid: 720 mL  Total IN: 720 mL    OUT:    Voided (mL): 300 mL  Total OUT: 300 mL    Total NET: 420 mL

## 2024-04-03 NOTE — DIETITIAN INITIAL EVALUATION ADULT - SIGNS/SYMPTOMS
<75% EER x >/= 1 mos, Wt loss of , BMI = 17.7  <75% EER x >/= 1 mos, Wt loss of >10% x 6 mos, BMI = 15.4

## 2024-04-03 NOTE — DIETITIAN INITIAL EVALUATION ADULT - REASON
Nutrition Focused Physical Exam deferred at this time secondary to pt with AMS/dementia - unable to obtain consent. RD will continue to reassess as able/appropriate.

## 2024-04-03 NOTE — DIETITIAN INITIAL EVALUATION ADULT - OTHER INFO
- Noted low vitamin D 25-hydroxy level (2/29)  - Ordered for multivitamin, B12 supplementation.     Endo:  - Hx of T2DM. HgbA1C of 5.9% (2/29) reflects good glycemic control in consideration of age.   - Hypothyroidism; ordered for PO synthroid.     Renal:  - PARVEEN likely pre-renal. Noted Na, K+ WNL (4/3). RD will continue to monitor electrolytes prn.    Wt Hx:  - Pt reports UBW of xx kg. Per chart, dosing wt of 45.4 kg (3/31).   - Wt hx in kg ([facility]) as follows: 43.1 (2/28), 61.2 (2/02/20) . RD will continue to monitor weight trends as available/able.   - IBW: 52.3 kg (based on ht of 63 in)  - Noted low vitamin D 25-hydroxy level (2/29)  - Ordered for multivitamin, B12 supplementation.     Endo:  - Hx of T2DM. HgbA1C of 5.9% (2/29) reflects good glycemic control in consideration of age.   - Hypothyroidism; ordered for PO synthroid.     Renal:  - PARVEEN likely pre-renal. Noted Na, K+ WNL (4/3). RD will continue to monitor electrolytes prn.    Wt Hx:  - Reports pt's UBW of 54.5 kg; endorses unintentional wt loss x last few mos, with most recent wt of 40 kg (2 weeks ago at Dr Burns). Per chart, dosing wt of 45.4 kg (3/31). RD obtained pt's bedscale wt of 39.6 kg (4/3).   - Wt hx in kg ([facility]) as follows: 43.1 (2/28), 44 (2/19), 45.4 (1/8), 44.5 (12/9/23), 48.1 (8/25/23), 49 (7/15/23), 52.2 (3/23/23), 53.5 (1/30/23), 61.2 (2/02/20). Suspected wt loss of 19.2% x ~8 mos (clinically significant, based on wts from 7/15/23 and 4/3/24). RD will continue to monitor weight trends as available/able.   - IBW: 52.3 kg (based on ht of 63 in)

## 2024-04-03 NOTE — PROGRESS NOTE ADULT - ASSESSMENT
83F with PMHx of hypothyroidism, dementia (AOx1-2 at baseline), depression and orthostatic hypotension presenting for lightheadedness when walking down the stairs. Patient recently admitted several weeks prior here for lightheadedness and pre-syncope. Extensive work up at that time included CT head, CTA H&N, MRI brain, and TTE. 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 Cardiology consulted and there was no intervention. CTA H&N with aneurysm, but per neurosurgery no intervention. Patient found to be orthostatic positive and was started on Midodrine 5 mg TID. Neurology was consulted and they believe her lightheadedness was due to orthostasis and vagally mediated. Patient eventually discharged home (though she was recommended for [facility]). Patient states she lives alone with an aide coming by. She was walking down the stairs when she began to feel lightheaded, but did not pass out. Given symptoms she called EMS who brought her in here. Here patient without complaints such as dizziness. CT head without acute pathology. The ED attempted to ambulate patient three times, but she was unable to.  Last admission:   A1c 5.9   b12 332   TSH WNL. 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 EF 67%   CTH no acute findings. chronic microvascular changes   CTA H/N with R ICA 4.9x3.8mm aneurysms with wide neck 3mm   CD neg   MRI brain with no acute infarct noted. chronic small vessel changes   o/e AAOx2, FELIPE no focal findings. no facial   Orthostatics +   CT 3/31 no changes. no acute findings   EEG neg     Impression   1) AMS and dementia, near baseline ; fluctuating delerium and sundowning now   2)  lightheadedness/dizziness   3) low B12   4) ICA aneurysm     - got haldol.  can give seroquel or zyprexa PRN if QTC < 500.  consider psych eval   - on asa and statin therapy  - check orthostatics.  on midodrine   - c/w b12. was in 300s   - outpatient neurosx for aneurysm   - check routine EEG ; care giver states RUE shaking   - if no improvement MRI brain  - telemetry  - PT/OT   - check FS, glucose control <180  - GI/DVT ppx  - Thank you for allowing me to participate in the care of this patient. Call with questions.   - spoke [facility] care giver at bedsdie 4/2  - outaptient f/u   - plan eventually for rehab   Alo Zuñiga MD  Vascular Neurology  Office: [phone]

## 2024-04-03 NOTE — PROGRESS NOTE ADULT - SUBJECTIVE AND OBJECTIVE BOX
[facility] [place] [facility]   MD SAAD HERNANDEZ MD RUORU WONG, PA    TEL:  FROM 9 AM to 5 PM ---OFFICE: [phone]  AVAILABLE ON TEAMS    FROM 5 PM - 9 AM PLEASE CALL ANSWERING SERVICE: [phone]    RENAL FOLLOW UP NOTE--Date of Service 04-03-24 @ 11:48  --------------------------------------------------------------------------------  HPI:      Pt seen and examined at bedside.       PAST HISTORY  --------------------------------------------------------------------------------  No significant changes to PMH, PSH, FHx, SHx, unless otherwise noted    ALLERGIES & MEDICATIONS  --------------------------------------------------------------------------------  Allergies    No Known Allergies    Intolerances      Standing Inpatient Medications  aspirin  chewable 81 milliGRAM(s) Oral daily  atorvastatin 80 milliGRAM(s) Oral at bedtime  cyanocobalamin 1000 MICROGram(s) Oral daily  influenza  Vaccine (HIGH DOSE) 0.7 milliLiter(s) IntraMuscular once  levothyroxine 50 MICROGram(s) Oral daily  melatonin 3 milliGRAM(s) Oral at bedtime  midodrine. 5 milliGRAM(s) Oral three times a day  multivitamin/minerals 1 Tablet(s) Oral daily  venlafaxine XR. 75 milliGRAM(s) Oral daily    PRN Inpatient Medications  acetaminophen     Tablet .. 650 milliGRAM(s) Oral every 6 hours PRN      REVIEW OF SYSTEMS  --------------------------------------------------------------------------------  General: no fever,  MSK: no edema     VITALS/PHYSICAL EXAM  --------------------------------------------------------------------------------  T(C): 36.3 (04-03-24 @ 10:39), Max: 37.1 (04-02-24 @ 14:37)  HR: 67 (04-03-24 @ 10:39) (67 - 85)  BP: 174/65 (04-03-24 @ 10:39) (140/60 - 174/65)  RR: 18 (04-03-24 @ 10:39) (18 - 18)  SpO2: 99% (04-03-24 @ 10:39) (95% - 100%)  Wt(kg): --        04-02-24 @ 07:01  -  04-03-24 @ 07:00  --------------------------------------------------------  IN: 720 mL / OUT: 300 mL / NET: 420 mL      Physical Exam:  	Gen: NAD  	HEENT: MMM  	Pulm: CTA B/L  	CV: S1S2  	Abd: Soft, +BS  	Ext: No LE edema B/L                      Neuro: Awake   	Skin: Warm and Dry   	Vascular access: NO HD catheter            no  chuyita  LABS/STUDIES  --------------------------------------------------------------------------------              12.4   5.73  >-----------<  233      [04-03-24 @ 07:34]              39.0     139  |  98  |  18  ----------------------------<  79      [04-03-24 @ 07:17]  4.2   |  27  |  1.32        Ca     9.7     [04-03-24 @ 07:17]            Creatinine Trend:  SCr 1.32 [04-03 @ 07:17]  SCr 1.34 [04-02 @ 07:07]  SCr 1.83 [03-31 @ 14:34]  SCr 2.04 [03-31 @ 11:04]    Urinalysis - [04-03-24 @ 07:17]      Color  / Appearance  / SG  / pH       Gluc 79 / Ketone   / Bili  / Urobili        Blood  / Protein  / Leuk Est  / Nitrite       RBC  / WBC  / Hyaline  / Gran  / Sq Epi  / Non Sq Epi  / Bacteria       Vitamin D (25OH) 24.1      [02-29-24 @ 07:51]  TSH 0.50      [02-28-24 @ 12:16]  Lipid: chol 196, TG 70, HDL 65, LDL --      [02-29-24 @ 07:51]

## 2024-04-03 NOTE — PROGRESS NOTE ADULT - SUBJECTIVE AND OBJECTIVE BOX
DATE OF SERVICE: 04-03-24 @ 16:43    Patient is a 83y old  Female who presents with a chief complaint of Syncope and collapse     (03 Apr 2024 11:49)      INTERVAL HISTORY: No acute events    REVIEW OF SYSTEMS:  CONSTITUTIONAL: No weakness  EYES/ENT: No visual changes;  No throat pain   NECK: No pain or stiffness  RESPIRATORY: No cough, wheezing; No shortness of breath  CARDIOVASCULAR: No chest pain or palpitations  GASTROINTESTINAL: No abdominal  pain. No nausea, vomiting, or hematemesis  GENITOURINARY: No dysuria, frequency or hematuria  NEUROLOGICAL: No stroke like symptoms  SKIN: No rashes      	  MEDICATIONS:  midodrine. 5 milliGRAM(s) Oral three times a day        PHYSICAL EXAM:  T(C): 36.4 (04-03-24 @ 12:23), Max: 36.7 (04-03-24 @ 04:50)  HR: 75 (04-03-24 @ 12:23) (67 - 85)  BP: 171/67 (04-03-24 @ 12:23) (140/60 - 174/65)  RR: 18 (04-03-24 @ 12:23) (18 - 18)  SpO2: 98% (04-03-24 @ 12:23) (98% - 100%)  Wt(kg): --  I&O's Summary    02 Apr 2024 07:01  -  03 Apr 2024 07:00  --------------------------------------------------------  IN: 720 mL / OUT: 300 mL / NET: 420 mL          Appearance: In no distress	  HEENT:    PERRL, EOMI	  Cardiovascular:  S1 S2, No JVD  Respiratory: Lungs clear to auscultation	  Gastrointestinal:  Soft, Non-tender, + BS	  Vascularature:  No edema of LE  Psychiatric: Appropriate affect   Neuro: no acute focal deficits                               12.4   5.73  )-----------( 233      ( 03 Apr 2024 07:34 )             39.0     04-03    139  |  98  |  18  ----------------------------<  79  4.2   |  27  |  1.32<H>    Ca    9.7      03 Apr 2024 07:17          Labs personally reviewed      ASSESSMENT/PLAN: 	  83F with PMHx of hypothyroidism, HLD, dementia (AOx1-2 at baseline), depression and orthostatic hypotension presenting for lightheadedness. On exam unable to explain symptoms.       1. Problem/Plan:  Problem: Orthostatic dizziness   - Remains positive on 4/1  - Unable to explain or report symptoms during exam  - Continue Midodrine 5mg PO TID  - Trend Orthos  - TTE 4/3 revealing EF 64%, no regional wall abnormalities     2. Problem/Plan:  Problem: Hyperlipidemia   - Continue Atorvastatin 80mg PO daily     3. Problem/Plan:  Problem: DVT ppx  - Currently on hold           JOI Shearer DO [facility]  Cardiovascular Medicine  [address] [place] [address], [address] [facility]  Available via call or text on [employer]AM[employer]  Office: [phone]

## 2024-04-03 NOTE — PROGRESS NOTE ADULT - SUBJECTIVE AND OBJECTIVE BOX
SUBJECTIVE / OVERNIGHT EVENTS:    patient seen and examined  resting comfortably in bed  awaiting echo    --------------------------------------------------------------------------------------------  LABS:                        12.4   5.73  )-----------( 233      ( 03 Apr 2024 07:34 )             39.0     04-03    139  |  98  |  18  ----------------------------<  79  4.2   |  27  |  1.32<H>    Ca    9.7      03 Apr 2024 07:17        CAPILLARY BLOOD GLUCOSE            Urinalysis Basic - ( 03 Apr 2024 07:17 )    Color: x / Appearance: x / SG: x / pH: x  Gluc: 79 mg/dL / Ketone: x  / Bili: x / Urobili: x   Blood: x / Protein: x / Nitrite: x   Leuk Esterase: x / RBC: x / WBC x   Sq Epi: x / Non Sq Epi: x / Bacteria: x        RADIOLOGY & ADDITIONAL TESTS:    Imaging Personally Reviewed:  [x] YES  [ ] NO    Consultant(s) Notes Reviewed:  [x] YES  [ ] NO    MEDICATIONS  (STANDING):  aspirin  chewable 81 milliGRAM(s) Oral daily  atorvastatin 80 milliGRAM(s) Oral at bedtime  cyanocobalamin 1000 MICROGram(s) Oral daily  influenza  Vaccine (HIGH DOSE) 0.7 milliLiter(s) IntraMuscular once  levothyroxine 50 MICROGram(s) Oral daily  melatonin 3 milliGRAM(s) Oral at bedtime  midodrine. 5 milliGRAM(s) Oral three times a day  multivitamin/minerals 1 Tablet(s) Oral daily  venlafaxine XR. 75 milliGRAM(s) Oral daily    MEDICATIONS  (PRN):  acetaminophen     Tablet .. 650 milliGRAM(s) Oral every 6 hours PRN Temp greater or equal to 38C (100.4F), Mild Pain (1 - 3), Moderate Pain (4 - 6), Severe Pain (7 - 10)      Care Discussed with Consultants/Other Providers [x] YES  [ ] NO    Vital Signs Last 24 Hrs  T(C): 36.7 (03 Apr 2024 04:50), Max: 37.1 (02 Apr 2024 14:37)  T(F): 98 (03 Apr 2024 04:50), Max: 98.7 (02 Apr 2024 14:37)  HR: 85 (03 Apr 2024 04:50) (74 - 85)  BP: 157/70 (03 Apr 2024 04:50) (140/60 - 160/48)  BP(mean): --  RR: 18 (03 Apr 2024 04:50) (18 - 18)  SpO2: 98% (03 Apr 2024 04:50) (95% - 100%)    Parameters below as of 03 Apr 2024 04:50  Patient On (Oxygen Delivery Method): room air      I&O's Summary    02 Apr 2024 07:01  -  03 Apr 2024 07:00  --------------------------------------------------------  IN: 720 mL / OUT: 300 mL / NET: 420 mL    PHYSICAL EXAM:  GENERAL: NAD  HEAD:  Atraumatic, Normocephalic  EYES: EOMI, PERRLA, conjunctiva and sclera clear  NECK: Supple, No JVD  CHEST/LUNG: Diminished bilaterally; No wheeze  HEART: Regular rate and rhythm; No murmurs, rubs, or gallops  ABDOMEN: Soft, Nontender, Nondistended; Bowel sounds present  NEURO: AAOx1, no focal weakness, 5/5 b/l extremity strength, b/l knee no arthritis, no effusion   EXTREMITIES:  2+ Peripheral Pulses, No clubbing, cyanosis, or edema  SKIN: No rashes or lesions

## 2024-04-03 NOTE — DIETITIAN INITIAL EVALUATION ADULT - OTHER CALCULATIONS
Used current dosing wt of 45.4 kg (3/31) for caloric/protein needs in consideration of advanced age.   Defer fluid needs to team.  Used current daily wt of 39.6 kg (4/3) for caloric/protein needs in consideration of advanced age, BMI <19, malnutrition.   Defer fluid needs to team.

## 2024-04-03 NOTE — PROGRESS NOTE ADULT - SUBJECTIVE AND OBJECTIVE BOX
Neurology     S: patient seen, agitated at times       Medications: MEDICATIONS  (STANDING):  aspirin  chewable 81 milliGRAM(s) Oral daily  atorvastatin 80 milliGRAM(s) Oral at bedtime  cyanocobalamin 1000 MICROGram(s) Oral daily  influenza  Vaccine (HIGH DOSE) 0.7 milliLiter(s) IntraMuscular once  levothyroxine 50 MICROGram(s) Oral daily  melatonin 3 milliGRAM(s) Oral at bedtime  midodrine. 5 milliGRAM(s) Oral three times a day  multivitamin/minerals 1 Tablet(s) Oral daily  venlafaxine XR. 75 milliGRAM(s) Oral daily    MEDICATIONS  (PRN):  acetaminophen     Tablet .. 650 milliGRAM(s) Oral every 6 hours PRN Temp greater or equal to 38C (100.4F), Mild Pain (1 - 3), Moderate Pain (4 - 6), Severe Pain (7 - 10)       Vitals:  Vital Signs Last 24 Hrs  T(C): 36.4 (03 Apr 2024 12:23), Max: 36.7 (03 Apr 2024 04:50)  T(F): 97.5 (03 Apr 2024 12:23), Max: 98 (03 Apr 2024 04:50)  HR: 75 (03 Apr 2024 12:23) (67 - 85)  BP: 171/67 (03 Apr 2024 12:23) (140/60 - 174/65)  BP(mean): --  RR: 18 (03 Apr 2024 12:23) (18 - 18)  SpO2: 98% (03 Apr 2024 12:23) (98% - 100%)    Parameters below as of 03 Apr 2024 12:23  Patient On (Oxygen Delivery Method): room air         Orthostatic VS    04-01-24 @ 08:45  Lying BP: Orthostatic BP (Lying Systolic): 169/Orthostatic BP (Lying Diastolic (mm Hg)): 69 HR: Orthostatic Pulse (Heart Rate (beats/min)): 70   Sitting BP: Orthostatic BP (Sitting Systolic): 155/Orthostatic BP (Sitting Diastolic (mm Hg)): 65 HR: Orthostatic Pulse (Heart Rate (beats/min)): 75  Standing BP: Orthostatic BP (Standing Systolic): 150/-- HR: Orthostatic Pulse (Heart Rate (beats/min)): 89  Site: Orthostatic BP/Pulse (Site): upper right arm   Mode: Orthostatic BP/ Pulse (Mode): electronic    04-01-24 @ 05:49  Lying BP: Orthostatic BP (Lying Systolic): 167/Orthostatic BP (Lying Diastolic (mm Hg)): 85 HR: Orthostatic Pulse (Heart Rate (beats/min)): 74   Sitting BP: Orthostatic BP (Sitting Systolic): 166/Orthostatic BP (Sitting Diastolic (mm Hg)): 62 HR: Orthostatic Pulse (Heart Rate (beats/min)): 83  Standing BP: Orthostatic BP (Standing Systolic): 142/Orthostatic BP (Standing Diastolic (mm Hg)): 63 HR: Orthostatic Pulse (Heart Rate (beats/min)): 88  Site: Orthostatic BP/Pulse (Site): upper right arm   Mode: Orthostatic BP/ Pulse (Mode): electronic      General Exam:   General Appearance: Appropriately dressed and in no acute distress       Head: Normocephalic, atraumatic and no dysmorphic features  Ear, Nose, and Throat: Moist mucous membranes  CVS: S1S2+  Resp: No SOB, no wheeze or rhonchi  GI: soft NT/ND  Extremities: No edema or cyanosis  Skin: No bruises or rashes     Neurological Exam:  Neurological Exam:  Mental Status: Awake, alert and oriented x 2  Able to follow simple and complex verbal commands. Able to name and repeat. fluent speech. No obvious aphasia or dysarthria noted.   Cranial Nerves: PERRL, EOMI, VFFC, sensation V1-V3 intact,  no obvious facial asymmetry , equal elevation of palate, scm/trap 5/5, tongue is midline on protrusion. no obvious papilledema on fundoscopic exam. Hearing is grossly intact.   Motor: Normal bulk, tone and strength throughout. Fine finger movements were intact and symmetric. generalized wekaness   Sensation: Intact to light touch and pinprick throughout. no right/left confusion. no extinction to tactile on DSS.   Reflexes: 1+ throughout at biceps, brachioradialis, triceps, patellars and ankles bilaterally and equal. No clonus. R toe and L toe were both downgoing.  Coordination: No dysmetria on FNF or HKS   Gait: deferred         Data/Labs/Imaging which I personally reviewed.       LABS:                          12.4   5.73  )-----------( 233      ( 03 Apr 2024 07:34 )             39.0     04-03    139  |  98  |  18  ----------------------------<  79  4.2   |  27  |  1.32<H>    Ca    9.7      03 Apr 2024 07:17          Urinalysis Basic - ( 03 Apr 2024 07:17 )    Color: x / Appearance: x / SG: x / pH: x  Gluc: 79 mg/dL / Ketone: x  / Bili: x / Urobili: x   Blood: x / Protein: x / Nitrite: x   Leuk Esterase: x / RBC: x / WBC x   Sq Epi: x / Non Sq Epi: x / Bacteria: x                < from: CT Head No Cont (03.31.24 @ 14:02) >    ACC: [id number] EXAM:  CT BRAIN   ORDERED BY: PETTY ALTAMIRANO     PROCEDURE DATE:  03/31/2024          INTERPRETATION:  EXAM: CT HEAD WITHOUT INTRAVENOUS CONTRAST    HISTORY: Syncopal episode, AAO x1    TECHNIQUE: Multiple axial images were obtained from the skull base to the   vertex. Sagittal and coronal reformatted images were obtained from the   axial data set. The images were reviewed in brain and bone windows.    COMPARISON: MRI of the brain November 29, 2024, CT of the head February 28, 2024    FINDINGS:    No acute intracranial hemorrhage. Areas of decreased attenuation   throughout the deep and periventricular white matter, compatible with   chronic small vessel disease. Parenchymal volume loss resulting in a ex   vacuo dilatation appearance of the bilateral ventricles. The extra-axial   spaces and basal cisterns are within normal limits. No midline shift or   mass effect present.    The cranial cervical junction is within normal limits. The sella is not   expanded. No depressed calvarial fracture. Left ethmoid air cell   polyp/mucous retention cyst.. The visualized mastoid air cells are well   aerated. The visualized orbits are status post cataract surgery.    IMPRESSION:    1.  No evidence of acute intracranial hemorrhageor midline shift.  2.  Chronic small vessel disease. If there is continued concern for acute   neurologic compromise, recommend MRI of the brain for further evaluation.      --- End of Report ---           TANISHA MCDOWELL MD; Resident Radiologist  This document has been electronically signed.  MIKE EATON MD; Attending Radiologist  This document has been electronically signed. Mar 31 2024  2:13PM    < end of copied text >        Clinical Impression:    Mild nonspecific diffuse or multifocal cerebral dysfunction.     No epileptiform pattern or seizure recorded.

## 2024-04-03 NOTE — DIETITIAN INITIAL EVALUATION ADULT - ADD RECOMMEND
[X] Continue with Regular Diet; Defer texture/consistency to Speech Language Pathologist prn.   [X] Continue with multivitamin once daily for micronutrient coverage; consider adding vitamin D supplementation, pending no medical contraindications.   [X] Malnutrition/BMI <19 Sticker placed in chart   [X] RD will continue to monitor PO intake, GI tolerance, weight trends, skin integrity, BMs, labs/electrolytes prn.   RD remains available upon request.  [X] Continue with Regular Diet; Defer texture/consistency to Speech Language Pathologist prn.   [X] Continue with multivitamin once daily for micronutrient coverage; consider adding thiamine supplementation in setting of poor PO intake, pending no medical contraindications.   [X] Consider addressing pt's/family's nutrition GOC regarding long term nutrition support as able/appropriate.   [X] Malnutrition/BMI <19 Sticker placed in chart   [X] RD will continue to monitor PO intake, GI tolerance, weight trends, skin integrity, BMs, labs/electrolytes prn.   RD remains available upon request.

## 2024-04-03 NOTE — DIETITIAN INITIAL EVALUATION ADULT - PERTINENT LABORATORY DATA
04-03    139  |  98  |  18  ----------------------------<  79  4.2   |  27  |  1.32<H>    Ca    9.7      03 Apr 2024 07:17    A1C with Estimated Average Glucose Result: 5.9 % (02-29-24 @ 07:49)

## 2024-04-03 NOTE — DIETITIAN INITIAL EVALUATION ADULT - REASON INDICATOR FOR ASSESSMENT
RD Consult Indicated for: MST Score 2 or >  Source: Mya George (via phone ([phone]); Team, Electronic Medical Record; Unable to interview pt at this time secondary to AMS/Dementia.   Chart reviewed, events noted.  RD Consult Indicated for: MST Score 2 or >  Source: Mya George (via phone ([phone]), Team, Electronic Medical Record; Unable to interview pt at this time secondary to AMS/Dementia.   Chart reviewed, events noted.

## 2024-04-03 NOTE — PROGRESS NOTE ADULT - ASSESSMENT
83F with PMHx of hypothyroidism, dementia (AOx1-2 at baseline), depression and orthostatic hypotension presenting for lightheadedness. Found to have PARVEEN    A/P:  PARVEEN:  Baseline Scr 1.5  Scr on admission 2.0  PARVEEN likely pre-renal  Clinically dehydrated, responded  to IVF  Echo 02/24: Normal EF  Urine is bland  Renal function stable today  Monitor I/O  Monitor renal function closely    Acidosis:  non-AG  improved  monitor serum co2    Orthostatic Hypotension:  on Midodrine  Monitor

## 2024-04-04 LAB
ANION GAP SERPL CALC-SCNC: 13 MMOL/L — SIGNIFICANT CHANGE UP (ref 5–17)
BUN SERPL-MCNC: 21 MG/DL — SIGNIFICANT CHANGE UP (ref 7–23)
CALCIUM SERPL-MCNC: 9.4 MG/DL — SIGNIFICANT CHANGE UP (ref 8.4–10.5)
CHLORIDE SERPL-SCNC: 100 MMOL/L — SIGNIFICANT CHANGE UP (ref 96–108)
CO2 SERPL-SCNC: 28 MMOL/L — SIGNIFICANT CHANGE UP (ref 22–31)
CREAT SERPL-MCNC: 1.47 MG/DL — HIGH (ref 0.5–1.3)
EGFR: 35 ML/MIN/1.73M2 — LOW
GLUCOSE SERPL-MCNC: 63 MG/DL — LOW (ref 70–99)
POTASSIUM SERPL-MCNC: 3.9 MMOL/L — SIGNIFICANT CHANGE UP (ref 3.5–5.3)
POTASSIUM SERPL-SCNC: 3.9 MMOL/L — SIGNIFICANT CHANGE UP (ref 3.5–5.3)
SODIUM SERPL-SCNC: 141 MMOL/L — SIGNIFICANT CHANGE UP (ref 135–145)

## 2024-04-04 RX ORDER — SODIUM CHLORIDE 9 MG/ML
1000 INJECTION INTRAMUSCULAR; INTRAVENOUS; SUBCUTANEOUS
Refills: 0 | Status: DISCONTINUED | OUTPATIENT
Start: 2024-04-04 | End: 2024-04-09

## 2024-04-04 RX ADMIN — Medication 75 MILLIGRAM(S): at 11:35

## 2024-04-04 RX ADMIN — Medication 81 MILLIGRAM(S): at 11:36

## 2024-04-04 RX ADMIN — PREGABALIN 1000 MICROGRAM(S): 225 CAPSULE ORAL at 11:35

## 2024-04-04 RX ADMIN — MIDODRINE HYDROCHLORIDE 5 MILLIGRAM(S): 2.5 TABLET ORAL at 11:36

## 2024-04-04 RX ADMIN — Medication 50 MICROGRAM(S): at 06:05

## 2024-04-04 RX ADMIN — Medication 3 MILLIGRAM(S): at 21:51

## 2024-04-04 RX ADMIN — SODIUM CHLORIDE 50 MILLILITER(S): 9 INJECTION INTRAMUSCULAR; INTRAVENOUS; SUBCUTANEOUS at 09:55

## 2024-04-04 RX ADMIN — MIDODRINE HYDROCHLORIDE 5 MILLIGRAM(S): 2.5 TABLET ORAL at 18:27

## 2024-04-04 RX ADMIN — ATORVASTATIN CALCIUM 80 MILLIGRAM(S): 80 TABLET, FILM COATED ORAL at 21:51

## 2024-04-04 RX ADMIN — Medication 1 TABLET(S): at 11:35

## 2024-04-04 RX ADMIN — MIDODRINE HYDROCHLORIDE 5 MILLIGRAM(S): 2.5 TABLET ORAL at 08:07

## 2024-04-04 NOTE — PROGRESS NOTE ADULT - SUBJECTIVE AND OBJECTIVE BOX
DATE OF SERVICE: 04-04-24 @ 12:47    Patient is a 83y old  Female who presents with a chief complaint of Lightheadedness (04 Apr 2024 11:27)      INTERVAL HISTORY: In no acute distress.     REVIEW OF SYSTEMS: Unable to fully participate in ROS  CONSTITUTIONAL: No weakness  EYES/ENT: No visual changes;  No throat pain   NECK: No pain or stiffness  RESPIRATORY: No cough, wheezing; No shortness of breath  CARDIOVASCULAR: No chest pain or palpitations  GASTROINTESTINAL: No abdominal  pain. No nausea, vomiting, or hematemesis  GENITOURINARY: No dysuria, frequency or hematuria  NEUROLOGICAL: No stroke like symptoms  SKIN: No rashes  	  MEDICATIONS:  midodrine. 5 milliGRAM(s) Oral three times a day        PHYSICAL EXAM:  T(C): 36.4 (04-04-24 @ 12:15), Max: 36.8 (04-03-24 @ 21:06)  HR: 89 (04-04-24 @ 12:15) (74 - 89)  BP: 150/79 (04-04-24 @ 12:15) (137/70 - 165/65)  RR: 18 (04-04-24 @ 12:15) (18 - 18)  SpO2: 100% (04-04-24 @ 12:15) (98% - 100%)  Wt(kg): --  I&O's Summary        Appearance: In no distress	  HEENT:    PERRL, EOMI	  Cardiovascular:  S1 S2, No JVD  Respiratory: Lungs clear to auscultation	  Gastrointestinal:  Soft, Non-tender, + BS	  Vascularature:  No edema of LE  Psychiatric: Appropriate affect   Neuro: no acute focal deficits                               12.4   5.73  )-----------( 233      ( 03 Apr 2024 07:34 )             39.0     04-04    141  |  100  |  21  ----------------------------<  63<L>  3.9   |  28  |  1.47<H>    Ca    9.4      04 Apr 2024 07:24          Labs personally reviewed      ASSESSMENT/PLAN: 	    83F with PMHx of hypothyroidism, HLD, dementia (AOx1-2 at baseline), depression and orthostatic hypotension presenting for lightheadedness. On exam unable to explain symptoms.       1. Problem/Plan:  Problem: Orthostatic dizziness   - Remains positive on 4/1  - Unable to explain or report symptoms during exam  - Continue Midodrine 5mg PO TID  - Trend Orthos  - TTE 4/3 revealing EF 64%, no regional wall abnormalities     2. Problem/Plan:  Problem: Hyperlipidemia   - Continue Atorvastatin 80mg PO daily     3. Problem/Plan:  Problem: DVT ppx  - Currently on hold       PRISCILLA Ngo-NP   Nadeem Zepeda DO [facility]  Cardiovascular Medicine  [address] [place] [address], [address]  Available through call or text on [employer]  Office: [phone]

## 2024-04-04 NOTE — PROGRESS NOTE ADULT - ASSESSMENT
83F with PMHx of hypothyroidism, dementia (AOx1-2 at baseline), depression and orthostatic hypotension presenting for lightheadedness when walking down the stairs.     Plan:    # Presyncope:  - Likely vagally mediated and orthostasis  - CXR w/ clear lungs  - CTH w/ No evidence of acute intracranial hemorrhage or midline shift  - EEG with no seizure activity   - Trend Orthos-> positive  - PT eval -> ROSEANNA  - C/w Midodrine  - TTE 4/3 revealing EF 64%, no regional wall abnormalities  - Cards following    # PARVEEN:   - Monitor I/O's  - Serial Cr  - Avoid nephrotoxins  - Renally dose medications  - Renal following    # HLD:  - C/w Statin    # Hypothyroidism:  - C/w Synthroid    # Depression:  - C/w Venlafaxine    # GI ppx:  - Bowel regimen prn    # DVT ppx:  - IPC's    DCP - [facility]    [facility]  [phone]

## 2024-04-04 NOTE — PROGRESS NOTE ADULT - ASSESSMENT
83F with PMHx of hypothyroidism, dementia (AOx1-2 at baseline), depression and orthostatic hypotension presenting for lightheadedness. Found to have PARVEEN    A/P:  PARVEEN:  Baseline Scr 1.5  Scr on admission 2.0  PARVEEN likely pre-renal  Clinically dehydrated, responded  to IVF  Echo 02/24: Normal EF  Urine is bland  Renal function worsening today.  Restarted NS 0.9% @ 50mL/hr x1L.  Monitor I/O  Monitor renal function closely    Acidosis:  non-AG  improved  monitor serum co2    Orthostatic Hypotension/HTN:  BP fluctuating.  on Midodrine  Monitor BP closely.

## 2024-04-04 NOTE — PROGRESS NOTE ADULT - SUBJECTIVE AND OBJECTIVE BOX
[facility] [place] [facility]OLOGY [facility]ACT[facility]   MD SAAD HERNANDEZ MD ANGELA WONG, PA QIAN CHEN, NP    TEL:  OFFICE: [phone]  From 5pm-7am Answering Service [phone]    -- RENAL FOLLOW UP NOTE ---Date of Service 04-04-24 @ 11:27    Patient is a 83y old  Female who presents with a chief complaint of Lightheadedness.    Patient seen and examined at bedside. No chest pain/SOB.    VITALS:  T(F): 98.1 (04-04-24 @ 04:46), Max: 98.3 (04-03-24 @ 21:06)  HR: 78 (04-04-24 @ 08:05)  BP: 137/70 (04-04-24 @ 08:05)  RR: 18 (04-04-24 @ 04:46)  SpO2: 99% (04-04-24 @ 04:46)  Wt(kg): --      PHYSICAL EXAM:  General: NAD  Neck: No JVD  Respiratory: CTAB, no wheezes, rales or rhonchi  Cardiovascular: S1, S2, RRR  Gastrointestinal: BS+, soft, NT/ND  Extremities: No peripheral edema    Hospital Medications:   MEDICATIONS  (STANDING):  aspirin  chewable 81 milliGRAM(s) Oral daily  atorvastatin 80 milliGRAM(s) Oral at bedtime  cyanocobalamin 1000 MICROGram(s) Oral daily  influenza  Vaccine (HIGH DOSE) 0.7 milliLiter(s) IntraMuscular once  levothyroxine 50 MICROGram(s) Oral daily  melatonin 3 milliGRAM(s) Oral at bedtime  midodrine. 5 milliGRAM(s) Oral three times a day  multivitamin/minerals 1 Tablet(s) Oral daily  sodium chloride 0.9%. 1000 milliLiter(s) (50 mL/Hr) IV Continuous <Continuous>  venlafaxine XR. 75 milliGRAM(s) Oral daily      LABS:  04-04    141  |  100  |  21  ----------------------------<  63<L>  3.9   |  28  |  1.47<H>    Ca    9.4      04 Apr 2024 07:24      Creatinine Trend: 1.47 <--, 1.32 <--, 1.34 <--, 1.83 <--, 2.04 <--                          12.4   5.73  )-----------( 233      ( 03 Apr 2024 07:34 )             39.0     Urine Studies:  Urinalysis - [04-04-24 @ 07:24]      Color  / Appearance  / SG  / pH       Gluc 63 / Ketone   / Bili  / Urobili        Blood  / Protein  / Leuk Est  / Nitrite       RBC  / WBC  / Hyaline  / Gran  / Sq Epi  / Non Sq Epi  / Bacteria       Vitamin D (25OH) 24.1      [02-29-24 @ 07:51]  TSH 0.50      [02-28-24 @ 12:16]  Lipid: chol 196, TG 70, HDL 65, LDL --      [02-29-24 @ 07:51]

## 2024-04-04 NOTE — PROGRESS NOTE ADULT - SUBJECTIVE AND OBJECTIVE BOX
SUBJECTIVE / OVERNIGHT EVENTS:    Patient seen and examined at bedside. No events noted overnight. Resting comfortably in bed      --------------------------------------------------------------------------------------------  LABS:                        12.4   5.73  )-----------( 233      ( 03 Apr 2024 07:34 )             39.0     04-04    141  |  100  |  21  ----------------------------<  63<L>  3.9   |  28  |  1.47<H>    Ca    9.4      04 Apr 2024 07:24        CAPILLARY BLOOD GLUCOSE            Urinalysis Basic - ( 04 Apr 2024 07:24 )    Color: x / Appearance: x / SG: x / pH: x  Gluc: 63 mg/dL / Ketone: x  / Bili: x / Urobili: x   Blood: x / Protein: x / Nitrite: x   Leuk Esterase: x / RBC: x / WBC x   Sq Epi: x / Non Sq Epi: x / Bacteria: x        RADIOLOGY & ADDITIONAL TESTS: < from: TTE W or WO Ultrasound Enhancing Agent (04.03.24 @ 06:59) >  _______________________________________________________________________________________     CONCLUSIONS:      1. Left ventricular cavity is normal in size. Left ventricular wall thickness is normal. Left ventricular systolic function is normal with an ejection fraction of 64 % by Corcoran's method of disks. There are no regional wall motion abnormalities seen.   2. Normal left ventricular diastolic function, with normal filling pressure.   3. Mildly enlarged right ventricular cavity size, with normal wall thickness, and normal systolic function.   4. No pericardial effusion seen.   5. Estimated pulmonary artery systolic pressure is 23 mmHg, consistent with normal pulmonary artery pressure.   6. Compared to the transthoracic echocardiogram performed on 2/29/2024, there have been no significant interval changes.    < end of copied text >      Imaging Personally Reviewed:  [x] YES  [ ] NO    Consultant(s) Notes Reviewed:  [x] YES  [ ] NO    MEDICATIONS  (STANDING):  aspirin  chewable 81 milliGRAM(s) Oral daily  atorvastatin 80 milliGRAM(s) Oral at bedtime  cyanocobalamin 1000 MICROGram(s) Oral daily  influenza  Vaccine (HIGH DOSE) 0.7 milliLiter(s) IntraMuscular once  levothyroxine 50 MICROGram(s) Oral daily  melatonin 3 milliGRAM(s) Oral at bedtime  midodrine. 5 milliGRAM(s) Oral three times a day  multivitamin/minerals 1 Tablet(s) Oral daily  sodium chloride 0.9%. 1000 milliLiter(s) (50 mL/Hr) IV Continuous <Continuous>  venlafaxine XR. 75 milliGRAM(s) Oral daily    MEDICATIONS  (PRN):  acetaminophen     Tablet .. 650 milliGRAM(s) Oral every 6 hours PRN Temp greater or equal to 38C (100.4F), Mild Pain (1 - 3), Moderate Pain (4 - 6), Severe Pain (7 - 10)      Care Discussed with Consultants/Other Providers [x] YES  [ ] NO    Vital Signs Last 24 Hrs  T(C): 36.7 (04 Apr 2024 04:46), Max: 36.8 (03 Apr 2024 21:06)  T(F): 98.1 (04 Apr 2024 04:46), Max: 98.3 (03 Apr 2024 21:06)  HR: 78 (04 Apr 2024 08:05) (67 - 78)  BP: 137/70 (04 Apr 2024 08:05) (137/70 - 174/65)  BP(mean): --  RR: 18 (04 Apr 2024 04:46) (18 - 18)  SpO2: 99% (04 Apr 2024 04:46) (98% - 99%)    Parameters below as of 04 Apr 2024 04:46  Patient On (Oxygen Delivery Method): room air      I&O's Summary      PHYSICAL EXAM:  GENERAL: NAD  HEAD:  Atraumatic, Normocephalic  EYES: EOMI, PERRLA, conjunctiva and sclera clear  NECK: Supple, No JVD  CHEST/LUNG: Diminished bilaterally; No wheeze  HEART: Regular rate and rhythm; No murmurs, rubs, or gallops  ABDOMEN: Soft, Nontender, Nondistended; Bowel sounds present  NEURO: AAOx1, no focal weakness, 5/5 b/l extremity strength, b/l knee no arthritis, no effusion   EXTREMITIES:  2+ Peripheral Pulses, No clubbing, cyanosis, or edema  SKIN: No rashes or lesions

## 2024-04-05 LAB
ANION GAP SERPL CALC-SCNC: 11 MMOL/L — SIGNIFICANT CHANGE UP (ref 5–17)
BUN SERPL-MCNC: 27 MG/DL — HIGH (ref 7–23)
CALCIUM SERPL-MCNC: 9 MG/DL — SIGNIFICANT CHANGE UP (ref 8.4–10.5)
CHLORIDE SERPL-SCNC: 104 MMOL/L — SIGNIFICANT CHANGE UP (ref 96–108)
CO2 SERPL-SCNC: 25 MMOL/L — SIGNIFICANT CHANGE UP (ref 22–31)
CREAT SERPL-MCNC: 1.52 MG/DL — HIGH (ref 0.5–1.3)
EGFR: 33 ML/MIN/1.73M2 — LOW
GLUCOSE SERPL-MCNC: 69 MG/DL — LOW (ref 70–99)
POTASSIUM SERPL-MCNC: 4 MMOL/L — SIGNIFICANT CHANGE UP (ref 3.5–5.3)
POTASSIUM SERPL-SCNC: 4 MMOL/L — SIGNIFICANT CHANGE UP (ref 3.5–5.3)
SODIUM SERPL-SCNC: 140 MMOL/L — SIGNIFICANT CHANGE UP (ref 135–145)

## 2024-04-05 RX ADMIN — Medication 75 MILLIGRAM(S): at 11:37

## 2024-04-05 RX ADMIN — Medication 1 TABLET(S): at 11:37

## 2024-04-05 RX ADMIN — Medication 81 MILLIGRAM(S): at 11:38

## 2024-04-05 RX ADMIN — ATORVASTATIN CALCIUM 80 MILLIGRAM(S): 80 TABLET, FILM COATED ORAL at 21:28

## 2024-04-05 RX ADMIN — MIDODRINE HYDROCHLORIDE 5 MILLIGRAM(S): 2.5 TABLET ORAL at 15:11

## 2024-04-05 RX ADMIN — Medication 3 MILLIGRAM(S): at 21:28

## 2024-04-05 RX ADMIN — PREGABALIN 1000 MICROGRAM(S): 225 CAPSULE ORAL at 11:37

## 2024-04-05 RX ADMIN — Medication 50 MICROGRAM(S): at 05:38

## 2024-04-05 RX ADMIN — MIDODRINE HYDROCHLORIDE 5 MILLIGRAM(S): 2.5 TABLET ORAL at 18:49

## 2024-04-05 RX ADMIN — MIDODRINE HYDROCHLORIDE 5 MILLIGRAM(S): 2.5 TABLET ORAL at 09:58

## 2024-04-05 NOTE — PROVIDER CONTACT NOTE (OTHER) - ACTION/TREATMENT ORDERED:
provider contact noted
provider made aware. push midodrine med to 8am per provider ordered
provider made aware. push med to 8am.

## 2024-04-05 NOTE — PROVIDER CONTACT NOTE (OTHER) - BACKGROUND
pt admitted for syncope and collapse. pt recently admitted several weeks prior here for lightheadedness and pre-syncope.
pt admitted for syncope and collapse. pt have BP and HR orthostatic positive.
Pt admitted for syncope and collapse. Pt was walking down the stairs when she feels lightheaded, but did not pass out.

## 2024-04-05 NOTE — PROGRESS NOTE ADULT - SUBJECTIVE AND OBJECTIVE BOX
SUBJECTIVE / OVERNIGHT EVENTS:      No events noted overnight. Resting comfortably in bed      --------------------------------------------------------------------------------------------  LABS:    04-05    140  |  104  |  27<H>  ----------------------------<  69<L>  4.0   |  25  |  1.52<H>    Ca    9.0      05 Apr 2024 04:48        CAPILLARY BLOOD GLUCOSE            Urinalysis Basic - ( 05 Apr 2024 04:48 )    Color: x / Appearance: x / SG: x / pH: x  Gluc: 69 mg/dL / Ketone: x  / Bili: x / Urobili: x   Blood: x / Protein: x / Nitrite: x   Leuk Esterase: x / RBC: x / WBC x   Sq Epi: x / Non Sq Epi: x / Bacteria: x        RADIOLOGY & ADDITIONAL TESTS:     Imaging Personally Reviewed:  [x] YES  [ ] NO    Consultant(s) Notes Reviewed:  [x] YES  [ ] NO    MEDICATIONS  (STANDING):  aspirin  chewable 81 milliGRAM(s) Oral daily  atorvastatin 80 milliGRAM(s) Oral at bedtime  cyanocobalamin 1000 MICROGram(s) Oral daily  influenza  Vaccine (HIGH DOSE) 0.7 milliLiter(s) IntraMuscular once  levothyroxine 50 MICROGram(s) Oral daily  melatonin 3 milliGRAM(s) Oral at bedtime  midodrine. 5 milliGRAM(s) Oral three times a day  multivitamin/minerals 1 Tablet(s) Oral daily  sodium chloride 0.9%. 1000 milliLiter(s) (50 mL/Hr) IV Continuous <Continuous>  venlafaxine XR. 75 milliGRAM(s) Oral daily    MEDICATIONS  (PRN):  acetaminophen     Tablet .. 650 milliGRAM(s) Oral every 6 hours PRN Temp greater or equal to 38C (100.4F), Mild Pain (1 - 3), Moderate Pain (4 - 6), Severe Pain (7 - 10)      Care Discussed with Consultants/Other Providers [x] YES  [ ] NO    Vital Signs Last 24 Hrs  T(C): 36.6 (05 Apr 2024 04:16), Max: 36.7 (04 Apr 2024 22:17)  T(F): 97.8 (05 Apr 2024 04:16), Max: 98.1 (04 Apr 2024 22:17)  HR: 78 (05 Apr 2024 08:17) (77 - 89)  BP: 149/67 (05 Apr 2024 08:17) (135/80 - 168/62)  BP(mean): --  RR: 18 (05 Apr 2024 04:16) (18 - 18)  SpO2: 96% (05 Apr 2024 04:16) (93% - 100%)    Parameters below as of 05 Apr 2024 04:16  Patient On (Oxygen Delivery Method): room air      I&O's Summary    04 Apr 2024 07:01  -  05 Apr 2024 07:00  --------------------------------------------------------  IN: 0 mL / OUT: 200 mL / NET: -200 mL          PHYSICAL EXAM:  GENERAL: NAD  HEAD:  Atraumatic, Normocephalic  EYES: EOMI, PERRLA, conjunctiva and sclera clear  NECK: Supple, No JVD  CHEST/LUNG: Diminished bilaterally; No wheeze  HEART: Regular rate and rhythm; No murmurs, rubs, or gallops  ABDOMEN: Soft, Nontender, Nondistended; Bowel sounds present  NEURO: AAOx1, no focal weakness, 5/5 b/l extremity strength, b/l knee no arthritis, no effusion   EXTREMITIES:  2+ Peripheral Pulses, No clubbing, cyanosis, or edema  SKIN: No rashes or lesions

## 2024-04-05 NOTE — PROVIDER CONTACT NOTE (OTHER) - SITUATION
pt morning /72. give midodrine morning med??
pt stated that she have a hard time peeing. bladder scan 931
pt positive for orthostatic BP but BP high 167/85.

## 2024-04-05 NOTE — PROGRESS NOTE ADULT - SUBJECTIVE AND OBJECTIVE BOX
[facility] [place] [facility]   MD SAAD HERNANDEZ MD RUORU WONG, PA    TEL:  FROM 9 AM to 5 PM ---OFFICE: [phone]  AVAILABLE ON TEAMS    FROM 5 PM - 9 AM PLEASE CALL ANSWERING SERVICE: [phone]    RENAL FOLLOW UP NOTE--Date of Service 04-05-24 @ 09:37  --------------------------------------------------------------------------------  HPI:      Pt seen and examined at bedside.       PAST HISTORY  --------------------------------------------------------------------------------  No significant changes to PMH, PSH, FHx, SHx, unless otherwise noted    ALLERGIES & MEDICATIONS  --------------------------------------------------------------------------------  Allergies    No Known Allergies    Intolerances      Standing Inpatient Medications  aspirin  chewable 81 milliGRAM(s) Oral daily  atorvastatin 80 milliGRAM(s) Oral at bedtime  cyanocobalamin 1000 MICROGram(s) Oral daily  influenza  Vaccine (HIGH DOSE) 0.7 milliLiter(s) IntraMuscular once  levothyroxine 50 MICROGram(s) Oral daily  melatonin 3 milliGRAM(s) Oral at bedtime  midodrine. 5 milliGRAM(s) Oral three times a day  multivitamin/minerals 1 Tablet(s) Oral daily  sodium chloride 0.9%. 1000 milliLiter(s) IV Continuous <Continuous>  venlafaxine XR. 75 milliGRAM(s) Oral daily    PRN Inpatient Medications  acetaminophen     Tablet .. 650 milliGRAM(s) Oral every 6 hours PRN      REVIEW OF SYSTEMS  --------------------------------------------------------------------------------  General: no fever  MSK: no edema     VITALS/PHYSICAL EXAM  --------------------------------------------------------------------------------  T(C): 36.6 (04-05-24 @ 04:16), Max: 36.7 (04-04-24 @ 22:17)  HR: 78 (04-05-24 @ 08:17) (77 - 89)  BP: 149/67 (04-05-24 @ 08:17) (135/80 - 168/62)  RR: 18 (04-05-24 @ 04:16) (18 - 18)  SpO2: 96% (04-05-24 @ 04:16) (93% - 100%)  Wt(kg): --        04-04-24 @ 07:01  -  04-05-24 @ 07:00  --------------------------------------------------------  IN: 0 mL / OUT: 200 mL / NET: -200 mL      Physical Exam:  	Gen: NAD  	HEENT: MMM  	Pulm: CTA B/L  	CV: S1S2  	Abd: Soft, +BS  	Ext: No LE edema B/L                      Neuro: Awake   	Skin: Warm and Dry   	Vascular access: NO HD catheter            no  chuyita  LABS/STUDIES  --------------------------------------------------------------------------------    140  |  104  |  27  ----------------------------<  69      [04-05-24 @ 04:48]  4.0   |  25  |  1.52        Ca     9.0     [04-05-24 @ 04:48]            Creatinine Trend:  SCr 1.52 [04-05 @ 04:48]  SCr 1.47 [04-04 @ 07:24]  SCr 1.32 [04-03 @ 07:17]  SCr 1.34 [04-02 @ 07:07]  SCr 1.83 [03-31 @ 14:34]    Urinalysis - [04-05-24 @ 04:48]      Color  / Appearance  / SG  / pH       Gluc 69 / Ketone   / Bili  / Urobili        Blood  / Protein  / Leuk Est  / Nitrite       RBC  / WBC  / Hyaline  / Gran  / Sq Epi  / Non Sq Epi  / Bacteria       Vitamin D (25OH) 24.1      [02-29-24 @ 07:51]  TSH 0.50      [02-28-24 @ 12:16]  Lipid: chol 196, TG 70, HDL 65, LDL --      [02-29-24 @ 07:51]

## 2024-04-05 NOTE — PROGRESS NOTE ADULT - SUBJECTIVE AND OBJECTIVE BOX
DATE OF SERVICE: 04-05-24 @ 13:07    Patient is a 83y old  Female who presents with a chief complaint of Lightheadedness (05 Apr 2024 11:20)      INTERVAL HISTORY: In no acute distress.     REVIEW OF SYSTEMS: Unable to fully participate in ROS  CONSTITUTIONAL: No weakness  EYES/ENT: No visual changes;  No throat pain   NECK: No pain or stiffness  RESPIRATORY: No cough, wheezing; No shortness of breath  CARDIOVASCULAR: No chest pain or palpitations  GASTROINTESTINAL: No abdominal  pain. No nausea, vomiting, or hematemesis  GENITOURINARY: No dysuria, frequency or hematuria  NEUROLOGICAL: No stroke like symptoms  SKIN: No rashes  	  MEDICATIONS:  midodrine. 5 milliGRAM(s) Oral three times a day        PHYSICAL EXAM:  T(C): 36.6 (04-05-24 @ 04:16), Max: 36.7 (04-04-24 @ 22:17)  HR: 78 (04-05-24 @ 08:17) (77 - 88)  BP: 149/67 (04-05-24 @ 08:17) (135/80 - 168/62)  RR: 18 (04-05-24 @ 04:16) (18 - 18)  SpO2: 96% (04-05-24 @ 04:16) (93% - 100%)  Wt(kg): --  I&O's Summary    04 Apr 2024 07:01  -  05 Apr 2024 07:00  --------------------------------------------------------  IN: 0 mL / OUT: 200 mL / NET: -200 mL          Appearance: In no distress	  HEENT:    PERRL, EOMI	  Cardiovascular:  S1 S2, No JVD  Respiratory: Lungs clear to auscultation	  Gastrointestinal:  Soft, Non-tender, + BS	  Vascularature:  No edema of LE  Psychiatric: Appropriate affect   Neuro: no acute focal deficits           04-05    140  |  104  |  27<H>  ----------------------------<  69<L>  4.0   |  25  |  1.52<H>    Ca    9.0      05 Apr 2024 04:48          Labs personally reviewed      ASSESSMENT/PLAN: 	    83F with PMHx of hypothyroidism, HLD, dementia (AOx1-2 at baseline), depression and orthostatic hypotension presenting for lightheadedness. On exam unable to explain symptoms.       1. Problem/Plan:  Problem: Orthostatic dizziness   - Remains positive on 4/1  - Unable to explain or report symptoms during exam  - Continue Midodrine 5mg PO TID  - TTE 4/3 revealing EF 64%, no regional wall abnormalities   - Orthostasis likely chronic and patient asymptomatic. No need to continue to trend.     2. Problem/Plan:  Problem: Hyperlipidemia   - Continue Atorvastatin 80mg PO daily     3. Problem/Plan:  Problem: DVT ppx  - Currently on hold     No further cardiac inpatient workup or medication adjustment at this time.     PRISCILLA Ngo-KRISTA Zepeda DO [facility]  Cardiovascular Medicine  [address] [place] [address], [address]  Available through call or text on [employer]  Office: [phone]

## 2024-04-05 NOTE — PROGRESS NOTE ADULT - ASSESSMENT
83F with PMHx of hypothyroidism, dementia (AOx1-2 at baseline), depression and orthostatic hypotension presenting for lightheadedness when walking down the stairs. Patient recently admitted several weeks prior here for lightheadedness and pre-syncope. Extensive work up at that time included CT head, CTA H&N, MRI brain, and TTE. 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 Cardiology consulted and there was no intervention. CTA H&N with aneurysm, but per neurosurgery no intervention. Patient found to be orthostatic positive and was started on Midodrine 5 mg TID. Neurology was consulted and they believe her lightheadedness was due to orthostasis and vagally mediated. Patient eventually discharged home (though she was recommended for ROSEANNA). Patient states she lives alone with an aide coming by. She was walking down the stairs when she began to feel lightheaded, but did not pass out. Given symptoms she called EMS who brought her in here. Here patient without complaints such as dizziness. CT head without acute pathology. The ED attempted to ambulate patient three times, but she was unable to.  Last admission:   A1c 5.9   b12 332   TSH WNL. 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 EF 67%   CTH no acute findings. chronic microvascular changes   CTA H/N with R ICA 4.9x3.8mm aneurysms with wide neck 3mm   CD neg   MRI brain with no acute infarct noted. chronic small vessel changes   o/e AAOx2, FELIPE no focal findings. no facial   Orthostatics +   CT 3/31 no changes. no acute findings   EEG neg     Impression   1) AMS and dementia, near baseline ; fluctuating delerium and sundowning now   2)  lightheadedness/dizziness   3) low B12   4) ICA aneurysm        - on asa and statin therapy  -  on midodrine ; othosattics +   - c/w b12. was in 300s   - outpatient neurosx for aneurysm   - check routine EEG ; care giver states RUE shaking --> neg   - if no improvement MRI brain  - telemetry  - PT/OT   - check FS, glucose control <180  - GI/DVT ppx  - Thank you for allowing me to participate in the care of this patient. Call with questions.   - spoke [facility] care giver at [facility] 4/2  - outaptient f/u   - plan eventually for rehab ROSEANNA Zuñiga MD  Vascular Neurology  Office: [phone]

## 2024-04-05 NOTE — PROVIDER CONTACT NOTE (OTHER) - ASSESSMENT
Pt AOx1-2 at baseline. pt morning /72 and pt have midodrine for morning med
Pt AOx2 at baseline. pt stated that she have a hard time peeing. Pt attempted to pee 2 times but failed. bladder scan with 439
Pt AOx1-2 at baseline. Pt have order for orthostatic BP and HR q24 and she is positive for orthostatic but her BP while lying down is 167/85. Pt also have morning med midodrine

## 2024-04-05 NOTE — PROGRESS NOTE ADULT - SUBJECTIVE AND OBJECTIVE BOX
Neurology     S: patient seen, agitated at times       Medications: MEDICATIONS  (STANDING):  aspirin  chewable 81 milliGRAM(s) Oral daily  atorvastatin 80 milliGRAM(s) Oral at bedtime  cyanocobalamin 1000 MICROGram(s) Oral daily  influenza  Vaccine (HIGH DOSE) 0.7 milliLiter(s) IntraMuscular once  levothyroxine 50 MICROGram(s) Oral daily  melatonin 3 milliGRAM(s) Oral at bedtime  midodrine. 5 milliGRAM(s) Oral three times a day  multivitamin/minerals 1 Tablet(s) Oral daily  sodium chloride 0.9%. 1000 milliLiter(s) (50 mL/Hr) IV Continuous <Continuous>  venlafaxine XR. 75 milliGRAM(s) Oral daily    MEDICATIONS  (PRN):  acetaminophen     Tablet .. 650 milliGRAM(s) Oral every 6 hours PRN Temp greater or equal to 38C (100.4F), Mild Pain (1 - 3), Moderate Pain (4 - 6), Severe Pain (7 - 10)       Vitals:  Vital Signs Last 24 Hrs  T(C): 36.6 (05 Apr 2024 20:10), Max: 36.7 (04 Apr 2024 22:17)  T(F): 97.8 (05 Apr 2024 20:10), Max: 98.1 (04 Apr 2024 22:17)  HR: 75 (05 Apr 2024 20:10) (75 - 83)  BP: 147/71 (05 Apr 2024 20:10) (147/69 - 168/62)  BP(mean): --  RR: 18 (05 Apr 2024 20:10) (18 - 19)  SpO2: 97% (05 Apr 2024 20:10) (93% - 100%)    Parameters below as of 05 Apr 2024 20:10  Patient On (Oxygen Delivery Method): room air            Orthostatic VS    04-01-24 @ 08:45  Lying BP: Orthostatic BP (Lying Systolic): 169/Orthostatic BP (Lying Diastolic (mm Hg)): 69 HR: Orthostatic Pulse (Heart Rate (beats/min)): 70   Sitting BP: Orthostatic BP (Sitting Systolic): 155/Orthostatic BP (Sitting Diastolic (mm Hg)): 65 HR: Orthostatic Pulse (Heart Rate (beats/min)): 75  Standing BP: Orthostatic BP (Standing Systolic): 150/-- HR: Orthostatic Pulse (Heart Rate (beats/min)): 89  Site: Orthostatic BP/Pulse (Site): upper right arm   Mode: Orthostatic BP/ Pulse (Mode): electronic    04-01-24 @ 05:49  Lying BP: Orthostatic BP (Lying Systolic): 167/Orthostatic BP (Lying Diastolic (mm Hg)): 85 HR: Orthostatic Pulse (Heart Rate (beats/min)): 74   Sitting BP: Orthostatic BP (Sitting Systolic): 166/Orthostatic BP (Sitting Diastolic (mm Hg)): 62 HR: Orthostatic Pulse (Heart Rate (beats/min)): 83  Standing BP: Orthostatic BP (Standing Systolic): 142/Orthostatic BP (Standing Diastolic (mm Hg)): 63 HR: Orthostatic Pulse (Heart Rate (beats/min)): 88  Site: Orthostatic BP/Pulse (Site): upper right arm   Mode: Orthostatic BP/ Pulse (Mode): electronic      General Exam:   General Appearance: Appropriately dressed and in no acute distress       Head: Normocephalic, atraumatic and no dysmorphic features  Ear, Nose, and Throat: Moist mucous membranes  CVS: S1S2+  Resp: No SOB, no wheeze or rhonchi  GI: soft NT/ND  Extremities: No edema or cyanosis  Skin: No bruises or rashes     Neurological Exam:  Neurological Exam:  Mental Status: Awake, alert and oriented x 2  Able to follow simple and complex verbal commands. Able to name and repeat. fluent speech. No obvious aphasia or dysarthria noted.   Cranial Nerves: PERRL, EOMI, VFFC, sensation V1-V3 intact,  no obvious facial asymmetry , equal elevation of palate, scm/trap 5/5, tongue is midline on protrusion. no obvious papilledema on fundoscopic exam. Hearing is grossly intact.   Motor: Normal bulk, tone and strength throughout. Fine finger movements were intact and symmetric. generalized wekaness   Sensation: Intact to light touch and pinprick throughout. no right/left confusion. no extinction to tactile on DSS.   Reflexes: 1+ throughout at biceps, brachioradialis, triceps, patellars and ankles bilaterally and equal. No clonus. R toe and L toe were both downgoing.  Coordination: No dysmetria on FNF or HKS   Gait: deferred         Data/Labs/Imaging which I personally reviewed.       LABS:      04-05    140  |  104  |  27<H>  ----------------------------<  69<L>  4.0   |  25  |  1.52<H>    Ca    9.0      05 Apr 2024 04:48          Urinalysis Basic - ( 05 Apr 2024 04:48 )    Color: x / Appearance: x / SG: x / pH: x  Gluc: 69 mg/dL / Ketone: x  / Bili: x / Urobili: x   Blood: x / Protein: x / Nitrite: x   Leuk Esterase: x / RBC: x / WBC x   Sq Epi: x / Non Sq Epi: x / Bacteria: x            < from: CT Head No Cont (03.31.24 @ 14:02) >    ACC: [id number] EXAM:  CT BRAIN   ORDERED BY: PETTY ALTAMIRANO     PROCEDURE DATE:  03/31/2024          INTERPRETATION:  EXAM: CT HEAD WITHOUT INTRAVENOUS CONTRAST    HISTORY: Syncopal episode, AAO x1    TECHNIQUE: Multiple axial images were obtained from the skull base to the   vertex. Sagittal and coronal reformatted images were obtained from the   axial data set. The images were reviewed in brain and bone windows.    COMPARISON: MRI of the brain November 29, 2024, CT of the head February 28, 2024    FINDINGS:    No acute intracranial hemorrhage. Areas of decreased attenuation   throughout the deep and periventricular white matter, compatible with   chronic small vessel disease. Parenchymal volume loss resulting in a ex   vacuo dilatation appearance of the bilateral ventricles. The extra-axial   spaces and basal cisterns are within normal limits. No midline shift or   mass effect present.    The cranial cervical junction is within normal limits. The sella is not   expanded. No depressed calvarial fracture. Left ethmoid air cell   polyp/mucous retention cyst.. The visualized mastoid air cells are well   aerated. The visualized orbits are status post cataract surgery.    IMPRESSION:    1.  No evidence of acute intracranial hemorrhageor midline shift.  2.  Chronic small vessel disease. If there is continued concern for acute   neurologic compromise, recommend MRI of the brain for further evaluation.      --- End of Report ---           TANISHA MCDOWELL MD; Resident Radiologist  This document has been electronically signed.  MIKE EATON MD; Attending Radiologist  This document has been electronically signed. Mar 31 2024  2:13PM    < end of copied text >        Clinical Impression:    Mild nonspecific diffuse or multifocal cerebral dysfunction.     No epileptiform pattern or seizure recorded.

## 2024-04-05 NOTE — PROGRESS NOTE ADULT - NSPROGADDITIONALINFOA_GEN_ALL_CORE
I have personally seen and examined the patient.  I fully participated in the care of this patient in conjunction with NP.  I have made amendments to the documentation where necessary, and agree with the history, physical exam, and plan as documented by the NP.    Juvenal Prado MD  [facility]  [phone]    #Orthostatic hypotension   -asymptomatic ; dc to rehab. vs 24 hr home care

## 2024-04-05 NOTE — PROGRESS NOTE ADULT - ASSESSMENT
83F with PMHx of hypothyroidism, dementia (AOx1-2 at baseline), depression and orthostatic hypotension presenting for lightheadedness when walking down the stairs.     Plan:    # Presyncope:   - Likely vagally mediated and orthostasis  - CXR w/ clear lungs  - CTH w/ No evidence of acute intracranial hemorrhage or midline shift  - EEG with no seizure activity   - Trend Orthos-> positive  - PT eval -> ROSEANNA  - C/w Midodrine  - TTE 4/3 revealing EF 64%, no regional wall abnormalities  - Cards following    # PARVEEN:   - Monitor I/O's  - Serial Cr  - Avoid nephrotoxins  - Renally dose medications  - Renal following    # HLD:  - C/w Statin    # Hypothyroidism:  - C/w Synthroid    # Depression:  - C/w Venlafaxine    # GI ppx:  - Bowel regimen prn    # DVT ppx:  - IPC's    DC planning to Rehab    [facility]  [phone]

## 2024-04-05 NOTE — PROGRESS NOTE ADULT - ASSESSMENT
83F with PMHx of hypothyroidism, dementia (AOx1-2 at baseline), depression and orthostatic hypotension presenting for lightheadedness. Found to have PARVEEN    A/P:  PARVEEN:  Baseline Scr 1.5  Scr on admission 2.0  PARVEEN likely pre-renal  Clinically dehydrated, responded  to IVF  Echo 02/24: Normal EF  Urine is bland  Renal function fluctutating  Monitor I/O  Monitor renal function closely    Acidosis:  non-AG  improved  monitor serum co2    Orthostatic Hypotension/HTN:  BP fluctuating.  on Midodrine  Monitor BP closely.

## 2024-04-06 RX ADMIN — ATORVASTATIN CALCIUM 80 MILLIGRAM(S): 80 TABLET, FILM COATED ORAL at 21:54

## 2024-04-06 RX ADMIN — PREGABALIN 1000 MICROGRAM(S): 225 CAPSULE ORAL at 11:41

## 2024-04-06 RX ADMIN — Medication 50 MICROGRAM(S): at 05:40

## 2024-04-06 RX ADMIN — Medication 81 MILLIGRAM(S): at 11:41

## 2024-04-06 RX ADMIN — MIDODRINE HYDROCHLORIDE 5 MILLIGRAM(S): 2.5 TABLET ORAL at 11:41

## 2024-04-06 RX ADMIN — MIDODRINE HYDROCHLORIDE 5 MILLIGRAM(S): 2.5 TABLET ORAL at 05:40

## 2024-04-06 RX ADMIN — Medication 1 TABLET(S): at 11:41

## 2024-04-06 RX ADMIN — MIDODRINE HYDROCHLORIDE 5 MILLIGRAM(S): 2.5 TABLET ORAL at 17:11

## 2024-04-06 RX ADMIN — Medication 75 MILLIGRAM(S): at 11:41

## 2024-04-06 RX ADMIN — Medication 3 MILLIGRAM(S): at 21:54

## 2024-04-06 NOTE — PROGRESS NOTE ADULT - SUBJECTIVE AND OBJECTIVE BOX
DATE OF SERVICE: 04-06-24 @ 13:49    Patient is a 83y old  Female who presents with a chief complaint of Lightheadedness (06 Apr 2024 13:14)      INTERVAL HISTORY: feels okay    REVIEW OF SYSTEMS:  CONSTITUTIONAL: No weakness  EYES/ENT: No visual changes;  No throat pain   NECK: No pain or stiffness  RESPIRATORY: No cough, wheezing; No shortness of breath  CARDIOVASCULAR: No chest pain or palpitations  GASTROINTESTINAL: No abdominal  pain. No nausea, vomiting, or hematemesis  GENITOURINARY: No dysuria, frequency or hematuria  NEUROLOGICAL: No stroke like symptoms  SKIN: No rashes    	  MEDICATIONS:  midodrine. 5 milliGRAM(s) Oral three times a day        PHYSICAL EXAM:  T(C): 36.5 (04-06-24 @ 04:45), Max: 36.6 (04-05-24 @ 20:10)  HR: 69 (04-06-24 @ 04:45) (69 - 83)  BP: 153/64 (04-06-24 @ 04:45) (147/69 - 153/65)  RR: 18 (04-06-24 @ 04:45) (18 - 19)  SpO2: 97% (04-06-24 @ 04:45) (97% - 100%)  Wt(kg): --  I&O's Summary    05 Apr 2024 07:01  -  06 Apr 2024 07:00  --------------------------------------------------------  IN: 0 mL / OUT: 350 mL / NET: -350 mL          Appearance: In no distress	  HEENT:    PERRL, EOMI	  Cardiovascular:  S1 S2, No JVD  Respiratory: Lungs clear to auscultation	  Gastrointestinal:  Soft, Non-tender, + BS	  Vascularature:  No edema of LE  Psychiatric: Appropriate affect   Neuro: no acute focal deficits           04-05    140  |  104  |  27<H>  ----------------------------<  69<L>  4.0   |  25  |  1.52<H>    Ca    9.0      05 Apr 2024 04:48          Labs personally reviewed      ASSESSMENT/PLAN: 	    83F with PMHx of hypothyroidism, HLD, dementia (AOx1-2 at baseline), depression and orthostatic hypotension presenting for lightheadedness. On exam unable to explain symptoms.       1. Problem/Plan:  Problem: Orthostatic dizziness   - Remains positive on 4/1  - Unable to explain or report symptoms during exam  - Continue Midodrine 5mg PO TID  - TTE 4/3 revealing EF 64%, no regional wall abnormalities   - Orthostasis likely chronic and patient asymptomatic. No need to continue to trend.     2. Problem/Plan:  Problem: Hyperlipidemia   - Continue Atorvastatin 80mg PO daily     3. Problem/Plan:  Problem: DVT ppx  - Currently on hold     No further cardiac inpatient workup or medication adjustment at this time.         Iolani Behrbom, AG-KRISTA Zepeda DO [facility]  Cardiovascular Medicine  [address] [place] [address], [address]  Available through call or text on [employer]  Office: [phone]

## 2024-04-06 NOTE — PROGRESS NOTE ADULT - ASSESSMENT
83F with PMHx of hypothyroidism, dementia (AOx1-2 at baseline), depression and orthostatic hypotension presenting for lightheadedness when walking down the stairs. Patient recently admitted several weeks prior here for lightheadedness and pre-syncope. Extensive work up at that time included CT head, CTA H&N, MRI brain, and TTE. 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 Cardiology consulted and there was no intervention. CTA H&N with aneurysm, but per neurosurgery no intervention. Patient found to be orthostatic positive and was started on Midodrine 5 mg TID. Neurology was consulted and they believe her lightheadedness was due to orthostasis and vagally mediated. Patient eventually discharged home (though she was recommended for [facility]). Patient states she lives alone with an aide coming by. She was walking down the stairs when she began to feel lightheaded, but did not pass out. Given symptoms she called EMS who brought her in here. Here patient without complaints such as dizziness. CT head without acute pathology. The ED attempted to ambulate patient three times, but she was unable to.  Last admission:   A1c 5.9   b12 332   TSH WNL. 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 EF 67%   CTH no acute findings. chronic microvascular changes   CTA H/N with R ICA 4.9x3.8mm aneurysms with wide neck 3mm   CD neg   MRI brain with no acute infarct noted. chronic small vessel changes   o/e AAOx2, FELIPE no focal findings. no facial   Orthostatics +   CT 3/31 no changes. no acute findings   EEG neg     Impression   1) AMS and dementia, near baseline ; fluctuating delerium and sundowning now   2)  lightheadedness/dizziness   3) low B12   4) ICA aneurysm        - on asa and statin therapy  -  on midodrine ; othosattics +   - c/w b12. was in 300s   - outpatient neurosx for aneurysm   - check routine EEG ; care giver states RUE shaking --> neg   - if no improvement MRI brain  - telemetry  - PT/OT   - check FS, glucose control <180  - GI/DVT ppx  - Thank you for allowing me to participate in the care of this patient. Call with questions.   - spoke [facility] care giver at [facility] 4/2  - outaptient f/u   dc planning   Alo Zuñiga MD  Vascular Neurology  Office: [phone]

## 2024-04-06 NOTE — PROGRESS NOTE ADULT - SUBJECTIVE AND OBJECTIVE BOX
[facility]   MD SAAD HERNANDEZ MD RUORU WONG, PA    TEL:  FROM 9 AM to 5 PM ---OFFICE: [phone]  AVAILABLE ON TEAMS    FROM 5 PM - 9 AM PLEASE CALL ANSWERING SERVICE: [phone]    RENAL FOLLOW UP NOTE--Date of Service 04-06-24 @ 09:31  --------------------------------------------------------------------------------  HPI:      Pt seen and examined at bedside.   Sitting up in chair      PAST HISTORY  --------------------------------------------------------------------------------  No significant changes to PMH, PSH, FHx, SHx, unless otherwise noted    ALLERGIES & MEDICATIONS  --------------------------------------------------------------------------------  Allergies    No Known Allergies    Intolerances      Standing Inpatient Medications  aspirin  chewable 81 milliGRAM(s) Oral daily  atorvastatin 80 milliGRAM(s) Oral at bedtime  cyanocobalamin 1000 MICROGram(s) Oral daily  influenza  Vaccine (HIGH DOSE) 0.7 milliLiter(s) IntraMuscular once  levothyroxine 50 MICROGram(s) Oral daily  melatonin 3 milliGRAM(s) Oral at bedtime  midodrine. 5 milliGRAM(s) Oral three times a day  multivitamin/minerals 1 Tablet(s) Oral daily  sodium chloride 0.9%. 1000 milliLiter(s) IV Continuous <Continuous>  venlafaxine XR. 75 milliGRAM(s) Oral daily    PRN Inpatient Medications  acetaminophen     Tablet .. 650 milliGRAM(s) Oral every 6 hours PRN      REVIEW OF SYSTEMS  --------------------------------------------------------------------------------  General: no fever  MSK: no edema     VITALS/PHYSICAL EXAM  --------------------------------------------------------------------------------  T(C): 36.5 (04-06-24 @ 04:45), Max: 36.6 (04-05-24 @ 20:10)  HR: 69 (04-06-24 @ 04:45) (69 - 83)  BP: 153/64 (04-06-24 @ 04:45) (147/69 - 153/65)  RR: 18 (04-06-24 @ 04:45) (18 - 19)  SpO2: 97% (04-06-24 @ 04:45) (97% - 100%)  Wt(kg): --        04-05-24 @ 07:01  -  04-06-24 @ 07:00  --------------------------------------------------------  IN: 0 mL / OUT: 350 mL / NET: -350 mL      Physical Exam:  	Gen: NAD  	HEENT: MMM  	Pulm: CTA B/L  	CV: S1S2  	Abd: Soft, +BS  	Ext: No LE edema B/L                      Neuro: Awake   	Skin: Warm and Dry   	Vascular access: NO HD catheter            no  chuyita  LABS/STUDIES  --------------------------------------------------------------------------------    140  |  104  |  27  ----------------------------<  69      [04-05-24 @ 04:48]  4.0   |  25  |  1.52        Ca     9.0     [04-05-24 @ 04:48]            Creatinine Trend:  SCr 1.52 [04-05 @ 04:48]  SCr 1.47 [04-04 @ 07:24]  SCr 1.32 [04-03 @ 07:17]  SCr 1.34 [04-02 @ 07:07]  SCr 1.83 [03-31 @ 14:34]    Urinalysis - [04-05-24 @ 04:48]      Color  / Appearance  / SG  / pH       Gluc 69 / Ketone   / Bili  / Urobili        Blood  / Protein  / Leuk Est  / Nitrite       RBC  / WBC  / Hyaline  / Gran  / Sq Epi  / Non Sq Epi  / Bacteria       Vitamin D (25OH) 24.1      [02-29-24 @ 07:51]  TSH 0.50      [02-28-24 @ 12:16]  Lipid: chol 196, TG 70, HDL 65, LDL --      [02-29-24 @ 07:51]

## 2024-04-06 NOTE — PROGRESS NOTE ADULT - SUBJECTIVE AND OBJECTIVE BOX
Patient is a 83y old  Female who presents with a chief complaint of Lightheadedness (06 Apr 2024 09:31)      SUBJECTIVE / OVERNIGHT EVENTS:  Patient seen and examined,   NO complaints.      Vital Signs Last 24 Hrs  T(C): 36.5 (06 Apr 2024 04:45), Max: 36.6 (05 Apr 2024 20:10)  T(F): 97.7 (06 Apr 2024 04:45), Max: 97.8 (05 Apr 2024 20:10)  HR: 69 (06 Apr 2024 04:45) (69 - 83)  BP: 153/64 (06 Apr 2024 04:45) (147/69 - 153/65)  BP(mean): --  RR: 18 (06 Apr 2024 04:45) (18 - 19)  SpO2: 97% (06 Apr 2024 04:45) (97% - 100%)    Parameters below as of 06 Apr 2024 04:45  Patient On (Oxygen Delivery Method): room air      I&O's Summary    05 Apr 2024 07:01  -  06 Apr 2024 07:00  --------------------------------------------------------  IN: 0 mL / OUT: 350 mL / NET: -350 mL        PHYSICAL EXAM:  GENERAL: NAD, AAOx2  HEAD:  Atraumatic, Normocephalic  EYES: EOMI; conjunctiva and sclera clear  NECK: Supple, No JVD, No LAD  CHEST/LUNG: B/L air entry; No wheezes, rales or rhonci   HEART: Regular rate and rhythm; No murmurs, rubs, or gallops  ABDOMEN: Soft, Nontender, Nondistended; Bowel sounds present  EXTREMITIES:  2+ Peripheral Pulses, No clubbing, cyanosis, or edema  SKIN: No rashes or lesions    LABS:    04-05    140  |  104  |  27<H>  ----------------------------<  69<L>  4.0   |  25  |  1.52<H>    Ca    9.0      05 Apr 2024 04:48        CAPILLARY BLOOD GLUCOSE            Urinalysis Basic - ( 05 Apr 2024 04:48 )    Color: x / Appearance: x / SG: x / pH: x  Gluc: 69 mg/dL / Ketone: x  / Bili: x / Urobili: x   Blood: x / Protein: x / Nitrite: x   Leuk Esterase: x / RBC: x / WBC x   Sq Epi: x / Non Sq Epi: x / Bacteria: x        RADIOLOGY & ADDITIONAL TESTS:    Imaging Personally Reviewed:  [x] YES  [ ] NO    Consultant(s) Notes Reviewed:  [x] YES  [ ] NO      MEDICATIONS  (STANDING):  aspirin  chewable 81 milliGRAM(s) Oral daily  atorvastatin 80 milliGRAM(s) Oral at bedtime  cyanocobalamin 1000 MICROGram(s) Oral daily  influenza  Vaccine (HIGH DOSE) 0.7 milliLiter(s) IntraMuscular once  levothyroxine 50 MICROGram(s) Oral daily  melatonin 3 milliGRAM(s) Oral at bedtime  midodrine. 5 milliGRAM(s) Oral three times a day  multivitamin/minerals 1 Tablet(s) Oral daily  sodium chloride 0.9%. 1000 milliLiter(s) (50 mL/Hr) IV Continuous <Continuous>  venlafaxine XR. 75 milliGRAM(s) Oral daily    MEDICATIONS  (PRN):  acetaminophen     Tablet .. 650 milliGRAM(s) Oral every 6 hours PRN Temp greater or equal to 38C (100.4F), Mild Pain (1 - 3), Moderate Pain (4 - 6), Severe Pain (7 - 10)      Care Discussed with Consultants/Other Providers [x] YES  [ ] NO    HEALTH ISSUES - PROBLEM Dx:

## 2024-04-06 NOTE — PROGRESS NOTE ADULT - SUBJECTIVE AND OBJECTIVE BOX
Neurology     S: patient seen, agitated at times       Medications: MEDICATIONS  (STANDING):  aspirin  chewable 81 milliGRAM(s) Oral daily  atorvastatin 80 milliGRAM(s) Oral at bedtime  cyanocobalamin 1000 MICROGram(s) Oral daily  influenza  Vaccine (HIGH DOSE) 0.7 milliLiter(s) IntraMuscular once  levothyroxine 50 MICROGram(s) Oral daily  melatonin 3 milliGRAM(s) Oral at bedtime  midodrine. 5 milliGRAM(s) Oral three times a day  multivitamin/minerals 1 Tablet(s) Oral daily  sodium chloride 0.9%. 1000 milliLiter(s) (50 mL/Hr) IV Continuous <Continuous>  venlafaxine XR. 75 milliGRAM(s) Oral daily    MEDICATIONS  (PRN):  acetaminophen     Tablet .. 650 milliGRAM(s) Oral every 6 hours PRN Temp greater or equal to 38C (100.4F), Mild Pain (1 - 3), Moderate Pain (4 - 6), Severe Pain (7 - 10)       Vitals:  Vital Signs Last 24 Hrs  T(C): 36.5 (06 Apr 2024 20:25), Max: 36.7 (06 Apr 2024 14:09)  T(F): 97.7 (06 Apr 2024 20:25), Max: 98.1 (06 Apr 2024 14:09)  HR: 85 (06 Apr 2024 20:25) (69 - 85)  BP: 152/79 (06 Apr 2024 20:25) (152/79 - 167/66)  BP(mean): --  RR: 18 (06 Apr 2024 20:25) (18 - 18)  SpO2: 96% (06 Apr 2024 20:25) (96% - 97%)    Parameters below as of 06 Apr 2024 20:25  Patient On (Oxygen Delivery Method): room air                Orthostatic VS    04-01-24 @ 08:45  Lying BP: Orthostatic BP (Lying Systolic): 169/Orthostatic BP (Lying Diastolic (mm Hg)): 69 HR: Orthostatic Pulse (Heart Rate (beats/min)): 70   Sitting BP: Orthostatic BP (Sitting Systolic): 155/Orthostatic BP (Sitting Diastolic (mm Hg)): 65 HR: Orthostatic Pulse (Heart Rate (beats/min)): 75  Standing BP: Orthostatic BP (Standing Systolic): 150/-- HR: Orthostatic Pulse (Heart Rate (beats/min)): 89  Site: Orthostatic BP/Pulse (Site): upper right arm   Mode: Orthostatic BP/ Pulse (Mode): electronic    04-01-24 @ 05:49  Lying BP: Orthostatic BP (Lying Systolic): 167/Orthostatic BP (Lying Diastolic (mm Hg)): 85 HR: Orthostatic Pulse (Heart Rate (beats/min)): 74   Sitting BP: Orthostatic BP (Sitting Systolic): 166/Orthostatic BP (Sitting Diastolic (mm Hg)): 62 HR: Orthostatic Pulse (Heart Rate (beats/min)): 83  Standing BP: Orthostatic BP (Standing Systolic): 142/Orthostatic BP (Standing Diastolic (mm Hg)): 63 HR: Orthostatic Pulse (Heart Rate (beats/min)): 88  Site: Orthostatic BP/Pulse (Site): upper right arm   Mode: Orthostatic BP/ Pulse (Mode): electronic      General Exam:   General Appearance: Appropriately dressed and in no acute distress       Head: Normocephalic, atraumatic and no dysmorphic features  Ear, Nose, and Throat: Moist mucous membranes  CVS: S1S2+  Resp: No SOB, no wheeze or rhonchi  GI: soft NT/ND  Extremities: No edema or cyanosis  Skin: No bruises or rashes     Neurological Exam:  Neurological Exam:  Mental Status: Awake, alert and oriented x 2  Able to follow simple and complex verbal commands. Able to name and repeat. fluent speech. No obvious aphasia or dysarthria noted.   Cranial Nerves: PERRL, EOMI, VFFC, sensation V1-V3 intact,  no obvious facial asymmetry , equal elevation of palate, scm/trap 5/5, tongue is midline on protrusion. no obvious papilledema on fundoscopic exam. Hearing is grossly intact.   Motor: Normal bulk, tone and strength throughout. Fine finger movements were intact and symmetric. generalized wekaness   Sensation: Intact to light touch and pinprick throughout. no right/left confusion. no extinction to tactile on DSS.   Reflexes: 1+ throughout at biceps, brachioradialis, triceps, patellars and ankles bilaterally and equal. No clonus. R toe and L toe were both downgoing.  Coordination: No dysmetria on FNF or HKS   Gait: deferred         Data/Labs/Imaging which I personally reviewed.         LABS:      04-05    140  |  104  |  27<H>  ----------------------------<  69<L>  4.0   |  25  |  1.52<H>    Ca    9.0      05 Apr 2024 04:48          Urinalysis Basic - ( 05 Apr 2024 04:48 )    Color: x / Appearance: x / SG: x / pH: x  Gluc: 69 mg/dL / Ketone: x  / Bili: x / Urobili: x   Blood: x / Protein: x / Nitrite: x   Leuk Esterase: x / RBC: x / WBC x   Sq Epi: x / Non Sq Epi: x / Bacteria: x        < from: CT Head No Cont (03.31.24 @ 14:02) >    ACC: [id number] EXAM:  CT BRAIN   ORDERED BY: PETTY ALTAMIRANO     PROCEDURE DATE:  03/31/2024          INTERPRETATION:  EXAM: CT HEAD WITHOUT INTRAVENOUS CONTRAST    HISTORY: Syncopal episode, AAO x1    TECHNIQUE: Multiple axial images were obtained from the skull base to the   vertex. Sagittal and coronal reformatted images were obtained from the   axial data set. The images were reviewed in brain and bone windows.    COMPARISON: MRI of the brain November 29, 2024, CT of the head February 28, 2024    FINDINGS:    No acute intracranial hemorrhage. Areas of decreased attenuation   throughout the deep and periventricular white matter, compatible with   chronic small vessel disease. Parenchymal volume loss resulting in a ex   vacuo dilatation appearance of the bilateral ventricles. The extra-axial   spaces and basal cisterns are within normal limits. No midline shift or   mass effect present.    The cranial cervical junction is within normal limits. The sella is not   expanded. No depressed calvarial fracture. Left ethmoid air cell   polyp/mucous retention cyst.. The visualized mastoid air cells are well   aerated. The visualized orbits are status post cataract surgery.    IMPRESSION:    1.  No evidence of acute intracranial hemorrhageor midline shift.  2.  Chronic small vessel disease. If there is continued concern for acute   neurologic compromise, recommend MRI of the brain for further evaluation.      --- End of Report ---           TANISHA MCDOWELL MD; Resident Radiologist  This document has been electronically signed.  MIKE EATON MD; Attending Radiologist  This document has been electronically signed. Mar 31 2024  2:13PM    < end of copied text >        Clinical Impression:    Mild nonspecific diffuse or multifocal cerebral dysfunction.     No epileptiform pattern or seizure recorded.

## 2024-04-06 NOTE — PROGRESS NOTE ADULT - ASSESSMENT
83F with PMHx of hypothyroidism, dementia (AOx1-2 at baseline), depression and orthostatic hypotension presenting for lightheadedness. Found to have PARVEEN    A/P:  PARVEEN:  Baseline Scr 1.5  Scr on admission 2.0  PARVEEN likely pre-renal  Clinically dehydrated, responded  to IVF  Echo 02/24: Normal EF  Urine is bland  Renal function fluctutating--pending labs today  Monitor I/O  Monitor renal function closely    Acidosis:  non-AG  monitor serum co2    Orthostatic Hypotension/HTN:  BP fluctuating.  on Midodrine  Monitor BP closely.

## 2024-04-06 NOTE — PROGRESS NOTE ADULT - ASSESSMENT
83F with PMHx of hypothyroidism, dementia (AOx1-2 at baseline), depression and orthostatic hypotension presenting for lightheadedness when walking down the stairs.     Plan:    # Presyncope:   - Likely vagally mediated and orthostasis  - CXR w/ clear lungs  - CTH w/ No evidence of acute intracranial hemorrhage or midline shift  - EEG with no seizure activity   - Trend Orthos-> positive  - PT eval -> ROSEANNA  - C/w Midodrine  - TTE 4/3 revealing EF 64%, no regional wall abnormalities  - Cards following    # PARVEEN:   - Monitor I/O's  - Serial Cr  - Avoid nephrotoxins  - Renally dose medications  - Renal following    # HLD:  - C/w Statin    # Hypothyroidism:  - C/w Synthroid    # Depression:  - C/w Venlafaxine    # GI ppx:  - Bowel regimen prn    # DVT ppx:  - IPC's    DC planning to Rehab vs HOme with home care    [employer]

## 2024-04-07 RX ORDER — MIDODRINE HYDROCHLORIDE 2.5 MG/1
5 TABLET ORAL THREE TIMES A DAY
Refills: 0 | Status: DISCONTINUED | OUTPATIENT
Start: 2024-04-07 | End: 2024-04-09

## 2024-04-07 RX ADMIN — Medication 50 MICROGRAM(S): at 05:00

## 2024-04-07 RX ADMIN — Medication 81 MILLIGRAM(S): at 12:30

## 2024-04-07 RX ADMIN — Medication 75 MILLIGRAM(S): at 12:30

## 2024-04-07 RX ADMIN — Medication 3 MILLIGRAM(S): at 23:47

## 2024-04-07 RX ADMIN — MIDODRINE HYDROCHLORIDE 5 MILLIGRAM(S): 2.5 TABLET ORAL at 17:57

## 2024-04-07 RX ADMIN — ATORVASTATIN CALCIUM 80 MILLIGRAM(S): 80 TABLET, FILM COATED ORAL at 23:47

## 2024-04-07 RX ADMIN — PREGABALIN 1000 MICROGRAM(S): 225 CAPSULE ORAL at 12:30

## 2024-04-07 RX ADMIN — Medication 1 TABLET(S): at 12:46

## 2024-04-07 NOTE — PROGRESS NOTE ADULT - ASSESSMENT
83F with PMHx of hypothyroidism, dementia (AOx1-2 at baseline), depression and orthostatic hypotension presenting for lightheadedness. Found to have PARVEEN    A/P:  PARVEEN:  Baseline Scr 1.5  Scr on admission 2.0  PARVEEN likely pre-renal  Clinically dehydrated, responded  to IVF  Echo 02/24: Normal EF  Urine is bland  Renal function fluctuating - Labs pending today   Monitor I/O  Monitor renal function closely    Acidosis:  non-AG  improving as of 4/5   monitor serum co2    Orthostatic Hypotension/HTN:  BP fluctuating   on Midodrine 5 mg TID   Monitor BP closely.

## 2024-04-07 NOTE — PROGRESS NOTE ADULT - SUBJECTIVE AND OBJECTIVE BOX
Patient is a 83y old  Female who presents with a chief complaint of Lightheadedness (06 Apr 2024 20:50)      SUBJECTIVE / OVERNIGHT EVENTS:  Patient seen and examined.   No overnight events.   In usual state. no issue.       Vital Signs Last 24 Hrs  T(C): 36.5 (07 Apr 2024 12:59), Max: 36.7 (06 Apr 2024 14:09)  T(F): 97.7 (07 Apr 2024 12:59), Max: 98.1 (06 Apr 2024 14:09)  HR: 88 (07 Apr 2024 12:59) (73 - 89)  BP: 104/72 (07 Apr 2024 12:59) (104/72 - 179/69)  BP(mean): --  RR: 18 (07 Apr 2024 12:59) (18 - 18)  SpO2: 98% (07 Apr 2024 12:59) (96% - 98%)    Parameters below as of 07 Apr 2024 12:59  Patient On (Oxygen Delivery Method): room air      I&O's Summary    06 Apr 2024 07:01  -  07 Apr 2024 07:00  --------------------------------------------------------  IN: 0 mL / OUT: 250 mL / NET: -250 mL        PHYSICAL EXAM:  GENERAL: NAD, AAOx3  HEAD:  Atraumatic, Normocephalic  EYES: EOMI; conjunctiva and sclera clear  NECK: Supple, No JVD, No LAD  CHEST/LUNG: B/L air entry; No wheezes, rales or rhonci   HEART: Regular rate and rhythm; No murmurs, rubs, or gallops  ABDOMEN: Soft, Nontender, Nondistended; Bowel sounds present  EXTREMITIES:  2+ Peripheral Pulses, No clubbing, cyanosis, or edema  SKIN: No rashes or lesions    LABS:            CAPILLARY BLOOD GLUCOSE                RADIOLOGY & ADDITIONAL TESTS:    Imaging Personally Reviewed:  [x] YES  [ ] NO    Consultant(s) Notes Reviewed:  [x] YES  [ ] NO      MEDICATIONS  (STANDING):  aspirin  chewable 81 milliGRAM(s) Oral daily  atorvastatin 80 milliGRAM(s) Oral at bedtime  cyanocobalamin 1000 MICROGram(s) Oral daily  influenza  Vaccine (HIGH DOSE) 0.7 milliLiter(s) IntraMuscular once  levothyroxine 50 MICROGram(s) Oral daily  melatonin 3 milliGRAM(s) Oral at bedtime  midodrine. 5 milliGRAM(s) Oral three times a day  multivitamin/minerals 1 Tablet(s) Oral daily  sodium chloride 0.9%. 1000 milliLiter(s) (50 mL/Hr) IV Continuous <Continuous>  venlafaxine XR. 75 milliGRAM(s) Oral daily    MEDICATIONS  (PRN):  acetaminophen     Tablet .. 650 milliGRAM(s) Oral every 6 hours PRN Temp greater or equal to 38C (100.4F), Mild Pain (1 - 3), Moderate Pain (4 - 6), Severe Pain (7 - 10)      Care Discussed with Consultants/Other Providers [x] YES  [ ] NO    HEALTH ISSUES - PROBLEM Dx:

## 2024-04-07 NOTE — PROGRESS NOTE ADULT - SUBJECTIVE AND OBJECTIVE BOX
DATE OF SERVICE: 04-07-24 @ 14:02    Patient is a 83y old  Female who presents with a chief complaint of Lightheadedness (07 Apr 2024 13:42)      INTERVAL HISTORY: confused, no c/o pain    REVIEW OF SYSTEMS:  CONSTITUTIONAL: No weakness  EYES/ENT: No visual changes;  No throat pain   NECK: No pain or stiffness  RESPIRATORY: No cough, wheezing; No shortness of breath  CARDIOVASCULAR: No chest pain or palpitations  GASTROINTESTINAL: No abdominal  pain. No nausea, vomiting, or hematemesis  GENITOURINARY: No dysuria, frequency or hematuria  NEUROLOGICAL: No stroke like symptoms  SKIN: No rashes    	  MEDICATIONS:  midodrine. 5 milliGRAM(s) Oral three times a day        PHYSICAL EXAM:  T(C): 36.5 (04-07-24 @ 12:59), Max: 36.7 (04-06-24 @ 14:09)  HR: 88 (04-07-24 @ 12:59) (73 - 89)  BP: 104/72 (04-07-24 @ 12:59) (104/72 - 179/69)  RR: 18 (04-07-24 @ 12:59) (18 - 18)  SpO2: 98% (04-07-24 @ 12:59) (96% - 98%)  Wt(kg): --  I&O's Summary    06 Apr 2024 07:01  -  07 Apr 2024 07:00  --------------------------------------------------------  IN: 0 mL / OUT: 250 mL / NET: -250 mL          Appearance: In no distress	  HEENT:    PERRL, EOMI	  Cardiovascular:  S1 S2, No JVD  Respiratory: Lungs clear to auscultation	  Gastrointestinal:  Soft, Non-tender, + BS	  Vascularature:  No edema of LE  Psychiatric: Appropriate affect   Neuro: no acute focal deficits                     Labs personally reviewed      ASSESSMENT/PLAN: 	      1. Problem/Plan:  Problem: Orthostatic dizziness   - Remains positive on 4/1  - Unable to explain or report symptoms during exam  - Continue Midodrine 5mg PO TID  - TTE 4/3 revealing EF 64%, no regional wall abnormalities   - Orthostasis likely chronic and patient asymptomatic. No need to continue to trend.     2. Problem/Plan:  Problem: Hyperlipidemia   - Continue Atorvastatin 80mg PO daily     3. Problem/Plan:  Problem: DVT ppx  - Currently on hold     No further cardiac inpatient workup or medication adjustment at this time.           Iolani Behrbom, AG-NP Omid Javdan, DO [facility]  Cardiovascular Medicine  [address] [place] [address], [address]  Available through call or text on [employer]  Office: [phone]

## 2024-04-07 NOTE — PROGRESS NOTE ADULT - SUBJECTIVE AND OBJECTIVE BOX
[facility]   MD SAAD HERNANDEZ MD BRIANA PETITO, NP    TEL:  FROM 9 AM to 5 PM ---OFFICE: [phone]  FROM 5 PM - 9 AM PLEASE CALL ANSWERING SERVICE: [phone]       RENAL PROGRESS NOTE: DATE OF SERVICE 04-07-24 @ 15:04  Patient is a 83y old  Female who presents with a chief complaint of Lightheadedness   Patient seen and examined at bedside. No chest pain/sob,. Pt states she wants to go home.     VITALS:  T(F): 97.7 (04-07-24 @ 12:59), Max: 97.8 (04-07-24 @ 04:32)  HR: 88 (04-07-24 @ 12:59)  BP: 104/72 (04-07-24 @ 12:59)  RR: 18 (04-07-24 @ 12:59)  SpO2: 98% (04-07-24 @ 12:59)  Wt(kg): --    04-06 @ 07:01  -  04-07 @ 07:00  --------------------------------------------------------  IN: 0 mL / OUT: 250 mL / NET: -250 mL      PHYSICAL EXAM:  Constitutional: NAD  Neck: No JVD  Respiratory: CTAB, no wheezes, rales or rhonchi  Cardiovascular: S1, S2, RRR  Gastrointestinal: BS+, soft, NT/ND  Extremities: No peripheral edema    Hospital Medications:   MEDICATIONS  (STANDING):  aspirin  chewable 81 milliGRAM(s) Oral daily  atorvastatin 80 milliGRAM(s) Oral at bedtime  cyanocobalamin 1000 MICROGram(s) Oral daily  influenza  Vaccine (HIGH DOSE) 0.7 milliLiter(s) IntraMuscular once  levothyroxine 50 MICROGram(s) Oral daily  melatonin 3 milliGRAM(s) Oral at bedtime  midodrine. 5 milliGRAM(s) Oral three times a day  multivitamin/minerals 1 Tablet(s) Oral daily  sodium chloride 0.9%. 1000 milliLiter(s) (50 mL/Hr) IV Continuous <Continuous>  venlafaxine XR. 75 milliGRAM(s) Oral daily      LABS:        Creatinine Trend: 1.52 <--, 1.47 <--, 1.32 <--, 1.34 <--            Urine Studies:  Urinalysis - [04-05-24 @ 04:48]      Color  / Appearance  / SG  / pH       Gluc 69 / Ketone   / Bili  / Urobili        Blood  / Protein  / Leuk Est  / Nitrite       RBC  / WBC  / Hyaline  / Gran  / Sq Epi  / Non Sq Epi  / Bacteria       Vitamin D (25OH) 24.1      [02-29-24 @ 07:51]  TSH 0.50      [02-28-24 @ 12:16]  Lipid: chol 196, TG 70, HDL 65, LDL --      [02-29-24 @ 07:51]        RADIOLOGY & ADDITIONAL STUDIES:

## 2024-04-08 LAB
ANION GAP SERPL CALC-SCNC: 15 MMOL/L — SIGNIFICANT CHANGE UP (ref 5–17)
BUN SERPL-MCNC: 19 MG/DL — SIGNIFICANT CHANGE UP (ref 7–23)
CALCIUM SERPL-MCNC: 9.7 MG/DL — SIGNIFICANT CHANGE UP (ref 8.4–10.5)
CHLORIDE SERPL-SCNC: 101 MMOL/L — SIGNIFICANT CHANGE UP (ref 96–108)
CO2 SERPL-SCNC: 22 MMOL/L — SIGNIFICANT CHANGE UP (ref 22–31)
CREAT SERPL-MCNC: 1.24 MG/DL — SIGNIFICANT CHANGE UP (ref 0.5–1.3)
EGFR: 43 ML/MIN/1.73M2 — LOW
GLUCOSE SERPL-MCNC: 103 MG/DL — HIGH (ref 70–99)
POTASSIUM SERPL-MCNC: 5 MMOL/L — SIGNIFICANT CHANGE UP (ref 3.5–5.3)
POTASSIUM SERPL-SCNC: 5 MMOL/L — SIGNIFICANT CHANGE UP (ref 3.5–5.3)
SODIUM SERPL-SCNC: 138 MMOL/L — SIGNIFICANT CHANGE UP (ref 135–145)

## 2024-04-08 RX ADMIN — Medication 1 TABLET(S): at 12:43

## 2024-04-08 RX ADMIN — Medication 50 MICROGRAM(S): at 05:11

## 2024-04-08 RX ADMIN — MIDODRINE HYDROCHLORIDE 5 MILLIGRAM(S): 2.5 TABLET ORAL at 05:11

## 2024-04-08 RX ADMIN — ATORVASTATIN CALCIUM 80 MILLIGRAM(S): 80 TABLET, FILM COATED ORAL at 21:42

## 2024-04-08 RX ADMIN — MIDODRINE HYDROCHLORIDE 5 MILLIGRAM(S): 2.5 TABLET ORAL at 16:41

## 2024-04-08 RX ADMIN — Medication 81 MILLIGRAM(S): at 12:42

## 2024-04-08 RX ADMIN — Medication 3 MILLIGRAM(S): at 21:26

## 2024-04-08 RX ADMIN — Medication 75 MILLIGRAM(S): at 12:42

## 2024-04-08 RX ADMIN — PREGABALIN 1000 MICROGRAM(S): 225 CAPSULE ORAL at 12:42

## 2024-04-08 NOTE — PROGRESS NOTE ADULT - ASSESSMENT
83F with PMHx of hypothyroidism, dementia (AOx1-2 at baseline), depression and orthostatic hypotension presenting for lightheadedness when walking down the stairs. Patient recently admitted several weeks prior here for lightheadedness and pre-syncope. Extensive work up at that time included CT head, CTA H&N, MRI brain, and TTE. 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 Cardiology consulted and there was no intervention. CTA H&N with aneurysm, but per neurosurgery no intervention. Patient found to be orthostatic positive and was started on Midodrine 5 mg TID. Neurology was consulted and they believe her lightheadedness was due to orthostasis and vagally mediated. Patient eventually discharged home (though she was recommended for [facility]). Patient states she lives alone with an aide coming by. She was walking down the stairs when she began to feel lightheaded, but did not pass out. Given symptoms she called EMS who brought her in here. Here patient without complaints such as dizziness. CT head without acute pathology. The ED attempted to ambulate patient three times, but she was unable to.  Last admission:   A1c 5.9   b12 332   TSH WNL. 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 EF 67%   CTH no acute findings. chronic microvascular changes   CTA H/N with R ICA 4.9x3.8mm aneurysms with wide neck 3mm   CD neg   MRI brain with no acute infarct noted. chronic small vessel changes   o/e AAOx2, FELIPE no focal findings. no facial   Orthostatics +   CT 3/31 no changes. no acute findings   EEG neg     Impression   1) AMS and dementia, near baseline ; fluctuating delerium and sundowning now   2)  lightheadedness/dizziness   3) low B12   4) ICA aneurysm        - on asa and statin therapy  -  on midodrine ; othosattics +   - c/w b12. was in 300s   - outpatient neurosx for aneurysm   - check routine EEG ; care giver states RUE shaking --> neg   - if no improvement MRI brain  - telemetry  - PT/OT   - check FS, glucose control <180  - GI/DVT ppx  - Thank you for allowing me to participate in the care of this patient. Call with questions.   - spoke [facility] care giver at [facility] 4/2  - outaptient f/u   dc planning   Alo Zuñiga MD  Vascular Neurology  Office: [phone]

## 2024-04-08 NOTE — OCCUPATIONAL THERAPY INITIAL EVALUATION ADULT - DIAGNOSIS, OT EVAL
Pt. presents with decreased strength, balance, postural control, endurance, cognition and safety awareness impacting ADLs/IADLs

## 2024-04-08 NOTE — OCCUPATIONAL THERAPY INITIAL EVALUATION ADULT - BALANCE TRAINING, PT EVAL
GOAL: pt. will increase dynamic standing balance by 1 grade in order to perform grooming tasks as sink independently

## 2024-04-08 NOTE — OCCUPATIONAL THERAPY INITIAL EVALUATION ADULT - NSOTDISCHREC_GEN_A_CORE
if home, home OT with assistance and supervision with ADL/IADLs, RW for ambulation due to decreased balance impacting MRADLs/Sub-acute Rehab

## 2024-04-08 NOTE — PROGRESS NOTE ADULT - SUBJECTIVE AND OBJECTIVE BOX
DATE OF SERVICE: 04-08-24 @ 11:44    Patient is a 83y old  Female who presents with a chief complaint of Lightheadedness (08 Apr 2024 10:43)      INTERVAL HISTORY: In no acute distress.     REVIEW OF SYSTEMS: limited participant in ROS  CONSTITUTIONAL: No weakness  EYES/ENT: No visual changes;  No throat pain   NECK: No pain or stiffness  RESPIRATORY: No cough, wheezing; No shortness of breath  CARDIOVASCULAR: No chest pain or palpitations  GASTROINTESTINAL: No abdominal  pain. No nausea, vomiting, or hematemesis  GENITOURINARY: No dysuria, frequency or hematuria  NEUROLOGICAL: No stroke like symptoms  SKIN: No rashes    	  MEDICATIONS:  midodrine. 5 milliGRAM(s) Oral three times a day        PHYSICAL EXAM:  T(C): 36.3 (04-08-24 @ 05:23), Max: 36.8 (04-07-24 @ 20:16)  HR: 75 (04-08-24 @ 11:33) (75 - 88)  BP: 154/71 (04-08-24 @ 11:33) (101/71 - 154/71)  RR: 18 (04-08-24 @ 05:23) (18 - 18)  SpO2: 98% (04-08-24 @ 11:33) (98% - 99%)  Wt(kg): --  I&O's Summary        Appearance: In no distress	  HEENT:    PERRL, EOMI	  Cardiovascular:  S1 S2, No JVD  Respiratory: Lungs clear to auscultation	  Gastrointestinal:  Soft, Non-tender, + BS	  Vascularature:  No edema of LE  Psychiatric: Appropriate affect   Neuro: no acute focal deficits           04-08    138  |  101  |  19  ----------------------------<  103<H>  5.0   |  22  |  1.24    Ca    9.7      08 Apr 2024 09:19          Labs personally reviewed      ASSESSMENT/PLAN: 	    1. Problem/Plan:  Problem: Orthostatic dizziness   - Remains positive on 4/1  - Unable to explain or report symptoms during exam  - Continue Midodrine 5mg PO TID  - TTE 4/3 revealing EF 64%, no regional wall abnormalities   - Orthostasis likely chronic and patient asymptomatic. No need to continue to trend.     2. Problem/Plan:  Problem: Hyperlipidemia   - Continue Atorvastatin 80mg PO daily     3. Problem/Plan:  Problem: DVT ppx  - Currently on hold     No further cardiac inpatient workup or medication adjustment at this time.         PRISCILLA Ngo-KRISTA Zepeda DO [facility]  Cardiovascular Medicine  [address] [place] [address], [address]  Available through call or text on [employer]  Office: [phone]

## 2024-04-08 NOTE — OCCUPATIONAL THERAPY INITIAL EVALUATION ADULT - ADL RETRAINING, OT EVAL
GOAL: pt. will perform lower body dressing independently while seated EOB; GOAL: pt. will perform upper body dressing independently while seated EOB

## 2024-04-08 NOTE — PROGRESS NOTE ADULT - SUBJECTIVE AND OBJECTIVE BOX
SUBJECTIVE / OVERNIGHT EVENTS:      Patient seen and examined at bedside. No events noted overnight. Resting comfortably in bed      --------------------------------------------------------------------------------------------  LABS:            CAPILLARY BLOOD GLUCOSE                RADIOLOGY & ADDITIONAL TESTS:     Imaging Personally Reviewed:  [x] YES  [ ] NO    Consultant(s) Notes Reviewed:  [x] YES  [ ] NO    MEDICATIONS  (STANDING):  aspirin  chewable 81 milliGRAM(s) Oral daily  atorvastatin 80 milliGRAM(s) Oral at bedtime  cyanocobalamin 1000 MICROGram(s) Oral daily  influenza  Vaccine (HIGH DOSE) 0.7 milliLiter(s) IntraMuscular once  levothyroxine 50 MICROGram(s) Oral daily  melatonin 3 milliGRAM(s) Oral at bedtime  midodrine. 5 milliGRAM(s) Oral three times a day  multivitamin/minerals 1 Tablet(s) Oral daily  sodium chloride 0.9%. 1000 milliLiter(s) (50 mL/Hr) IV Continuous <Continuous>  venlafaxine XR. 75 milliGRAM(s) Oral daily    MEDICATIONS  (PRN):  acetaminophen     Tablet .. 650 milliGRAM(s) Oral every 6 hours PRN Temp greater or equal to 38C (100.4F), Mild Pain (1 - 3), Moderate Pain (4 - 6), Severe Pain (7 - 10)      Care Discussed with Consultants/Other Providers [x] YES  [ ] NO    Vital Signs Last 24 Hrs  T(C): 36.3 (08 Apr 2024 05:23), Max: 36.8 (07 Apr 2024 20:16)  T(F): 97.4 (08 Apr 2024 05:23), Max: 98.3 (07 Apr 2024 20:16)  HR: 75 (08 Apr 2024 05:23) (75 - 88)  BP: 134/65 (08 Apr 2024 05:23) (101/71 - 134/65)  BP(mean): --  RR: 18 (08 Apr 2024 05:23) (18 - 18)  SpO2: 98% (08 Apr 2024 05:23) (98% - 99%)    Parameters below as of 08 Apr 2024 05:23  Patient On (Oxygen Delivery Method): room air      I&O's Summary        PHYSICAL EXAM:  GENERAL: NAD, AAOx3  HEAD:  Atraumatic, Normocephalic  EYES: EOMI; conjunctiva and sclera clear  NECK: Supple, No JVD, No LAD  CHEST/LUNG: B/L air entry; No wheezes, rales or rhonci   HEART: Regular rate and rhythm; No murmurs, rubs, or gallops  ABDOMEN: Soft, Nontender, Nondistended; Bowel sounds present  EXTREMITIES:  2+ Peripheral Pulses, No clubbing, cyanosis, or edema  SKIN: No rashes or lesions

## 2024-04-08 NOTE — PROGRESS NOTE ADULT - ASSESSMENT
83F with PMHx of hypothyroidism, dementia (AOx1-2 at baseline), depression and orthostatic hypotension presenting for lightheadedness when walking down the stairs.     Plan:    # Presyncope:   - Likely vagally mediated and orthostasis  - CXR w/ clear lungs  - CTH w/ No evidence of acute intracranial hemorrhage or midline shift  - EEG with no seizure activity   - Orthos-> positive  - PT eval -> ROSEANNA  - C/w Midodrine  - TTE 4/3 revealing EF 64%, no regional wall abnormalities  - Cards following-> - Orthostasis likely chronic and patient asymptomatic. No need to continue to trend.     # PARVEEN:   - Monitor I/O's  - Serial Cr  - Avoid nephrotoxins  - Renally dose medications  - Renal following    # HLD:  - C/w Statin    # Hypothyroidism:  - C/w Synthroid    # Depression:  - C/w Venlafaxine    # GI ppx:  - Bowel regimen prn    # DVT ppx:  - IPC's    DC planning to [facility]    [employer]  [phone]

## 2024-04-08 NOTE — OCCUPATIONAL THERAPY INITIAL EVALUATION ADULT - ADDITIONAL COMMENTS
Per prior chart Pt lives in a private home with family there are 5 steps to enter, 0 inside. Pt performed ADL/IADLs independently. Ambulates with no assistive device. Owns DME: rolling walker, commode

## 2024-04-08 NOTE — PROGRESS NOTE ADULT - ASSESSMENT
83F with PMHx of hypothyroidism, dementia (AOx1-2 at baseline), depression and orthostatic hypotension presenting for lightheadedness. Found to have PARVEEN    A/P:  PARVEEN:  Baseline Scr 1.5  Scr on admission 2.0  PARVEEN likely pre-renal  Clinically dehydrated, responded  to IVF  Echo 02/24: Normal EF  Urine is bland  Renal function fluctuating -   Monitor I/O  Monitor renal function closely    Acidosis:  non-AG  monitor serum co2    Orthostatic Hypotension/HTN:  BP fluctuating   on Midodrine 5 mg TID   Monitor BP closely.

## 2024-04-08 NOTE — OCCUPATIONAL THERAPY INITIAL EVALUATION ADULT - VISUAL ASSESSMENT: VISUAL NEGLECT
Subjective:     Patient ID: Maci Moses is a 76 y.o. female.    Chief Complaint: No chief complaint on file.    Patient presents to clinic for a follow up regarding the Rt. Plantar heel pain.  Notes the usual build up in pain at the site.  Rates as a 10/10.  Symptoms continue to be aggravated with all weight bearing and alleviated with rest only.  She continues applying ebanel lotion to the area on a daily basis.  Requests to have the lesion trimmed. Denies any additional pedal complaints.      No past medical history on file.    Past Surgical History:   Procedure Laterality Date    HYSTERECTOMY      partial @ age 33     NOSE SURGERY      WRIST SURGERY         No family history on file.    Social History     Socioeconomic History    Marital status:    Tobacco Use    Smoking status: Former     Current packs/day: 0.00     Types: Cigarettes     Quit date: 2/2/2020     Years since quitting: 3.7    Smokeless tobacco: Never   Substance and Sexual Activity    Alcohol use: No    Drug use: No    Sexual activity: Never       Current Outpatient Medications   Medication Sig Dispense Refill    acetaminophen-codeine 300-30mg (TYLENOL #3) 300-30 mg Tab Take 1 tablet every 6 hours by oral route.      amoxicillin-clavulanate 500-125mg (AUGMENTIN) 500-125 mg Tab Take 1 tablet every 12 hours by oral route for 7 days.      aspirin 162.5 mg Cp24 ASPIRIN 81 MG TABS      cephALEXin (KEFLEX) 500 MG capsule Take 1 capsule 3 times a day by oral route for 7 days.      furosemide (LASIX) 20 MG tablet TAKE 1 TABLET EVERY DAY 90 tablet 0    potassium chloride SA (K-DUR,KLOR-CON) 20 MEQ tablet TAKE 1 TABLET (20 MEQ TOTAL) BY MOUTH ONCE DAILY. 90 tablet 1    rosuvastatin (CRESTOR) 10 MG tablet       traMADoL (ULTRAM) 50 mg tablet Take 1 tablet every 12 hours by oral route for 5 days.       No current facility-administered medications for this visit.       Review of patient's allergies indicates:  No Known Allergies     Review of Systems    Constitutional: Negative for chills and fever.   Cardiovascular:  Positive for leg swelling. Negative for claudication.   Skin:  Positive for suspicious lesions. Negative for color change and nail changes.   Musculoskeletal:  Negative for muscle cramps and muscle weakness.   Gastrointestinal:  Negative for nausea and vomiting.   Neurological:  Negative for numbness and paresthesias.        Objective:     Physical Exam  Constitutional:       Appearance: Normal appearance. She is not ill-appearing.   Cardiovascular:      Pulses:           Dorsalis pedis pulses are 2+ on the right side and 2+ on the left side.        Posterior tibial pulses are 2+ on the right side and 2+ on the left side.      Comments: CFT is < 3 seconds bilateral.  Pedal hair growth is present bilateral.  Moderate nonpitting lower extremity edema noted bilateral.  Toes are warm to touch bilateral.    Musculoskeletal:         General: Tenderness and deformity present. No signs of injury.      Right lower leg: No edema.      Left lower leg: No edema.      Comments: Muscle strength 5/5 in all muscle groups bilateral.  No tenderness nor crepitation with ROM of foot/ankle joints bilateral.  Semi-reducible contracture of toes 2-5 bilateral.  Pain with palpation to the Rt. Heel porokeratosis   Skin:     General: Skin is warm.      Capillary Refill: Capillary refill takes 2 to 3 seconds.      Findings: Lesion present. No bruising, erythema, laceration, petechiae, rash or wound.      Comments: Pedal skin has normal turgor, temperature, and texture bilateral.  Toenails x 10 appear normotrophic. Porokeratosis noted to the Rt. Plantar central heel.       Neurological:      General: No focal deficit present.      Mental Status: She is alert.      Sensory: No sensory deficit.      Motor: No weakness or atrophy.      Comments: Light touch is intact bilateral.             Assessment:      Encounter Diagnoses   Name Primary?    Pain of right heel Yes     Porokeratosis        Plan:     Diagnoses and all orders for this visit:    Pain of right heel    Porokeratosis        I counseled the patient on her conditions, their implications and medical management.      With the patient's verbal consent, a sterile #15 scalpel was used to trim lesion x 1 down to smooth appearing skin without incident.  Patient tolerated this quite well.    Advise to ambulate only in the [place] shoes.    To resume application of Ebanel and an occlusive dressing to the porokeratosis of the Rt. Plantar heel once daily x 1 month.    RTC in 4 weeks for maintenance.      Lucien Diaz DPM       none

## 2024-04-08 NOTE — PROGRESS NOTE ADULT - SUBJECTIVE AND OBJECTIVE BOX
[facility]   MD SAAD HERNANDEZ MD RUORU WONG, [place]    TEL:  FROM 9 AM to 5 PM ---OFFICE: [phone]  AVAILABLE ON TEAMS    FROM 5 PM - 9 AM PLEASE CALL ANSWERING SERVICE: [phone]    RENAL FOLLOW UP NOTE--Date of Service 04-08-24 @ 10:43  --------------------------------------------------------------------------------  HPI:      Pt seen and examined at bedside.       PAST HISTORY  --------------------------------------------------------------------------------  No significant changes to PMH, PSH, FHx, SHx, unless otherwise noted    ALLERGIES & MEDICATIONS  --------------------------------------------------------------------------------  Allergies    Drug Allergies Not Recorded  Seafood (Other (Mild))    Intolerances      Standing Inpatient Medications  aspirin  chewable 81 milliGRAM(s) Oral daily  atorvastatin 80 milliGRAM(s) Oral at bedtime  cyanocobalamin 1000 MICROGram(s) Oral daily  influenza  Vaccine (HIGH DOSE) 0.7 milliLiter(s) IntraMuscular once  levothyroxine 50 MICROGram(s) Oral daily  melatonin 3 milliGRAM(s) Oral at bedtime  midodrine. 5 milliGRAM(s) Oral three times a day  multivitamin/minerals 1 Tablet(s) Oral daily  sodium chloride 0.9%. 1000 milliLiter(s) IV Continuous <Continuous>  venlafaxine XR. 75 milliGRAM(s) Oral daily    PRN Inpatient Medications  acetaminophen     Tablet .. 650 milliGRAM(s) Oral every 6 hours PRN      REVIEW OF SYSTEMS  --------------------------------------------------------------------------------  General: no fever  MSK: no edema     VITALS/PHYSICAL EXAM  --------------------------------------------------------------------------------  T(C): 36.3 (04-08-24 @ 05:23), Max: 36.8 (04-07-24 @ 20:16)  HR: 75 (04-08-24 @ 05:23) (75 - 88)  BP: 134/65 (04-08-24 @ 05:23) (101/71 - 134/65)  RR: 18 (04-08-24 @ 05:23) (18 - 18)  SpO2: 98% (04-08-24 @ 05:23) (98% - 99%)  Wt(kg): --        Physical Exam:  	Gen: NAD  	HEENT: MMM  	Pulm: CTA B/L  	CV: S1S2  	Abd: Soft, +BS  	Ext: No LE edema B/L                      Neuro: Awake   	Skin: Warm and Dry   	Vascular access: NO HD catheter            no  chuyita  LABS/STUDIES  --------------------------------------------------------------------------------    138  |  101  |  19  ----------------------------<  103      [04-08-24 @ 09:19]  5.0   |  22  |  1.24        Ca     9.7     [04-08-24 @ 09:19]            Creatinine Trend:  SCr 1.24 [04-08 @ 09:19]  SCr 1.52 [04-05 @ 04:48]  SCr 1.47 [04-04 @ 07:24]  SCr 1.32 [04-03 @ 07:17]  SCr 1.34 [04-02 @ 07:07]    Urinalysis - [04-08-24 @ 09:19]      Color  / Appearance  / SG  / pH       Gluc 103 / Ketone   / Bili  / Urobili        Blood  / Protein  / Leuk Est  / Nitrite       RBC  / WBC  / Hyaline  / Gran  / Sq Epi  / Non Sq Epi  / Bacteria       Vitamin D (25OH) 24.1      [02-29-24 @ 07:51]  TSH 0.50      [02-28-24 @ 12:16]  Lipid: chol 196, TG 70, HDL 65, LDL --      [02-29-24 @ 07:51]

## 2024-04-08 NOTE — PROGRESS NOTE ADULT - SUBJECTIVE AND OBJECTIVE BOX
Neurology     S: patient seen, agitated at times       Medications: MEDICATIONS  (STANDING):  aspirin  chewable 81 milliGRAM(s) Oral daily  atorvastatin 80 milliGRAM(s) Oral at bedtime  cyanocobalamin 1000 MICROGram(s) Oral daily  influenza  Vaccine (HIGH DOSE) 0.7 milliLiter(s) IntraMuscular once  levothyroxine 50 MICROGram(s) Oral daily  melatonin 3 milliGRAM(s) Oral at bedtime  midodrine. 5 milliGRAM(s) Oral three times a day  multivitamin/minerals 1 Tablet(s) Oral daily  sodium chloride 0.9%. 1000 milliLiter(s) (50 mL/Hr) IV Continuous <Continuous>  venlafaxine XR. 75 milliGRAM(s) Oral daily    MEDICATIONS  (PRN):  acetaminophen     Tablet .. 650 milliGRAM(s) Oral every 6 hours PRN Temp greater or equal to 38C (100.4F), Mild Pain (1 - 3), Moderate Pain (4 - 6), Severe Pain (7 - 10)       Vitals:  Vital Signs Last 24 Hrs  T(C): 36.5 (08 Apr 2024 11:33), Max: 36.8 (07 Apr 2024 20:16)  T(F): 97.7 (08 Apr 2024 11:33), Max: 98.3 (07 Apr 2024 20:16)  HR: 75 (08 Apr 2024 11:33) (75 - 79)  BP: 154/71 (08 Apr 2024 11:33) (101/71 - 154/71)  BP(mean): --  RR: 18 (08 Apr 2024 05:23) (18 - 18)  SpO2: 98% (08 Apr 2024 11:33) (98% - 99%)    Parameters below as of 08 Apr 2024 11:33  Patient On (Oxygen Delivery Method): room air                  Orthostatic VS    04-01-24 @ 08:45  Lying BP: Orthostatic BP (Lying Systolic): 169/Orthostatic BP (Lying Diastolic (mm Hg)): 69 HR: Orthostatic Pulse (Heart Rate (beats/min)): 70   Sitting BP: Orthostatic BP (Sitting Systolic): 155/Orthostatic BP (Sitting Diastolic (mm Hg)): 65 HR: Orthostatic Pulse (Heart Rate (beats/min)): 75  Standing BP: Orthostatic BP (Standing Systolic): 150/-- HR: Orthostatic Pulse (Heart Rate (beats/min)): 89  Site: Orthostatic BP/Pulse (Site): upper right arm   Mode: Orthostatic BP/ Pulse (Mode): electronic    04-01-24 @ 05:49  Lying BP: Orthostatic BP (Lying Systolic): 167/Orthostatic BP (Lying Diastolic (mm Hg)): 85 HR: Orthostatic Pulse (Heart Rate (beats/min)): 74   Sitting BP: Orthostatic BP (Sitting Systolic): 166/Orthostatic BP (Sitting Diastolic (mm Hg)): 62 HR: Orthostatic Pulse (Heart Rate (beats/min)): 83  Standing BP: Orthostatic BP (Standing Systolic): 142/Orthostatic BP (Standing Diastolic (mm Hg)): 63 HR: Orthostatic Pulse (Heart Rate (beats/min)): 88  Site: Orthostatic BP/Pulse (Site): upper right arm   Mode: Orthostatic BP/ Pulse (Mode): electronic      General Exam:   General Appearance: Appropriately dressed and in no acute distress       Head: Normocephalic, atraumatic and no dysmorphic features  Ear, Nose, and Throat: Moist mucous membranes  CVS: S1S2+  Resp: No SOB, no wheeze or rhonchi  GI: soft NT/ND  Extremities: No edema or cyanosis  Skin: No bruises or rashes     Neurological Exam:  Neurological Exam:  Mental Status: Awake, alert and oriented x 2  Able to follow simple and complex verbal commands. Able to name and repeat. fluent speech. No obvious aphasia or dysarthria noted.   Cranial Nerves: PERRL, EOMI, VFFC, sensation V1-V3 intact,  no obvious facial asymmetry , equal elevation of palate, scm/trap 5/5, tongue is midline on protrusion. no obvious papilledema on fundoscopic exam. Hearing is grossly intact.   Motor: Normal bulk, tone and strength throughout. Fine finger movements were intact and symmetric. generalized wekaness   Sensation: Intact to light touch and pinprick throughout. no right/left confusion. no extinction to tactile on DSS.   Reflexes: 1+ throughout at biceps, brachioradialis, triceps, patellars and ankles bilaterally and equal. No clonus. R toe and L toe were both downgoing.  Coordination: No dysmetria on FNF or HKS   Gait: deferred         Data/Labs/Imaging which I personally reviewed.        LABS:      04-08    138  |  101  |  19  ----------------------------<  103<H>  5.0   |  22  |  1.24    Ca    9.7      08 Apr 2024 09:19          Urinalysis Basic - ( 08 Apr 2024 09:19 )    Color: x / Appearance: x / SG: x / pH: x  Gluc: 103 mg/dL / Ketone: x  / Bili: x / Urobili: x   Blood: x / Protein: x / Nitrite: x   Leuk Esterase: x / RBC: x / WBC x   Sq Epi: x / Non Sq Epi: x / Bacteria: x        < from: CT Head No Cont (03.31.24 @ 14:02) >    ACC: [id number] EXAM:  CT BRAIN   ORDERED BY: PETTY ALTAMIRANO     PROCEDURE DATE:  03/31/2024          INTERPRETATION:  EXAM: CT HEAD WITHOUT INTRAVENOUS CONTRAST    HISTORY: Syncopal episode, AAO x1    TECHNIQUE: Multiple axial images were obtained from the skull base to the   vertex. Sagittal and coronal reformatted images were obtained from the   axial data set. The images were reviewed in brain and bone windows.    COMPARISON: MRI of the brain November 29, 2024, CT of the head February 28, 2024    FINDINGS:    No acute intracranial hemorrhage. Areas of decreased attenuation   throughout the deep and periventricular white matter, compatible with   chronic small vessel disease. Parenchymal volume loss resulting in a ex   vacuo dilatation appearance of the bilateral ventricles. The extra-axial   spaces and basal cisterns are within normal limits. No midline shift or   mass effect present.    The cranial cervical junction is within normal limits. The sella is not   expanded. No depressed calvarial fracture. Left ethmoid air cell   polyp/mucous retention cyst.. The visualized mastoid air cells are well   aerated. The visualized orbits are status post cataract surgery.    IMPRESSION:    1.  No evidence of acute intracranial hemorrhageor midline shift.  2.  Chronic small vessel disease. If there is continued concern for acute   neurologic compromise, recommend MRI of the brain for further evaluation.      --- End of Report ---           TANISHA MCDOWELL MD; Resident Radiologist  This document has been electronically signed.  MIKE EATON MD; Attending Radiologist  This document has been electronically signed. Mar 31 2024  2:13PM    < end of copied text >        Clinical Impression:    Mild nonspecific diffuse or multifocal cerebral dysfunction.     No epileptiform pattern or seizure recorded.

## 2024-04-08 NOTE — OCCUPATIONAL THERAPY INITIAL EVALUATION ADULT - PERTINENT HX OF CURRENT PROBLEM, REHAB EVAL
83F with PMHx of hypothyroidism, dementia (AOx1-2 at baseline), depression and orthostatic hypotension presented to ED on 3/31 for lightheadedness when walking down the stairs. Patient recently admitted several weeks prior for lightheadedness and pre-syncope. Extensive work up at that time included CT head, CTA H&N, MRI brain, and TTE. 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 Patient found to be orthostatic positive and was started on Midodrine 5 mg TID. Neurology was consulted and they believe her lightheadedness was due to orthostasis and vagally mediated. Patient eventually discharged home (though she was recommended for ROSEANNA).  CXR Clear lungs    CTH No evidence of acute intracranial hemorrhageor midline shift. Chronic small vessel disease. If there is continued concern for acute neurologic compromise, recommend MRI of the brain for further evaluation.

## 2024-04-09 ENCOUNTER — APPOINTMENT (OUTPATIENT)
Dept: NEUROLOGY | Facility: CLINIC | Age: 84
End: 2024-04-09

## 2024-04-09 ENCOUNTER — TRANSCRIPTION ENCOUNTER (OUTPATIENT)
Age: 84
End: 2024-04-09

## 2024-04-09 VITALS
TEMPERATURE: 98 F | OXYGEN SATURATION: 98 % | DIASTOLIC BLOOD PRESSURE: 63 MMHG | SYSTOLIC BLOOD PRESSURE: 164 MMHG | HEART RATE: 92 BPM | RESPIRATION RATE: 18 BRPM

## 2024-04-09 PROBLEM — Z00.00 ENCOUNTER FOR PREVENTIVE HEALTH EXAMINATION: Status: ACTIVE | Noted: 2024-04-09

## 2024-04-09 LAB — SARS-COV-2 RNA SPEC QL NAA+PROBE: SIGNIFICANT CHANGE UP

## 2024-04-09 PROCEDURE — 85014 HEMATOCRIT: CPT

## 2024-04-09 PROCEDURE — 87637 SARSCOV2&INF A&B&RSV AMP PRB: CPT

## 2024-04-09 PROCEDURE — 84484 ASSAY OF TROPONIN QUANT: CPT

## 2024-04-09 PROCEDURE — 84295 ASSAY OF SERUM SODIUM: CPT

## 2024-04-09 PROCEDURE — 85610 PROTHROMBIN TIME: CPT

## 2024-04-09 PROCEDURE — 97166 OT EVAL MOD COMPLEX 45 MIN: CPT

## 2024-04-09 PROCEDURE — 85730 THROMBOPLASTIN TIME PARTIAL: CPT

## 2024-04-09 PROCEDURE — 87635 SARS-COV-2 COVID-19 AMP PRB: CPT

## 2024-04-09 PROCEDURE — 93306 TTE W/DOPPLER COMPLETE: CPT

## 2024-04-09 PROCEDURE — 85018 HEMOGLOBIN: CPT

## 2024-04-09 PROCEDURE — 87086 URINE CULTURE/COLONY COUNT: CPT

## 2024-04-09 PROCEDURE — 82435 ASSAY OF BLOOD CHLORIDE: CPT

## 2024-04-09 PROCEDURE — 82803 BLOOD GASES ANY COMBINATION: CPT

## 2024-04-09 PROCEDURE — 97530 THERAPEUTIC ACTIVITIES: CPT

## 2024-04-09 PROCEDURE — 99285 EMERGENCY DEPT VISIT HI MDM: CPT

## 2024-04-09 PROCEDURE — 70450 CT HEAD/BRAIN W/O DYE: CPT | Mod: MC

## 2024-04-09 PROCEDURE — 85027 COMPLETE CBC AUTOMATED: CPT

## 2024-04-09 PROCEDURE — 83690 ASSAY OF LIPASE: CPT

## 2024-04-09 PROCEDURE — 36415 COLL VENOUS BLD VENIPUNCTURE: CPT

## 2024-04-09 PROCEDURE — 84132 ASSAY OF SERUM POTASSIUM: CPT

## 2024-04-09 PROCEDURE — 95816 EEG AWAKE AND DROWSY: CPT

## 2024-04-09 PROCEDURE — 81003 URINALYSIS AUTO W/O SCOPE: CPT

## 2024-04-09 PROCEDURE — 82947 ASSAY GLUCOSE BLOOD QUANT: CPT

## 2024-04-09 PROCEDURE — 71046 X-RAY EXAM CHEST 2 VIEWS: CPT

## 2024-04-09 PROCEDURE — 80053 COMPREHEN METABOLIC PANEL: CPT

## 2024-04-09 PROCEDURE — 83605 ASSAY OF LACTIC ACID: CPT

## 2024-04-09 PROCEDURE — 82330 ASSAY OF CALCIUM: CPT

## 2024-04-09 PROCEDURE — 97116 GAIT TRAINING THERAPY: CPT

## 2024-04-09 PROCEDURE — 80048 BASIC METABOLIC PNL TOTAL CA: CPT

## 2024-04-09 PROCEDURE — 85025 COMPLETE CBC W/AUTO DIFF WBC: CPT

## 2024-04-09 PROCEDURE — 97162 PT EVAL MOD COMPLEX 30 MIN: CPT

## 2024-04-09 RX ORDER — ACETAMINOPHEN 500 MG
2 TABLET ORAL
Qty: 0 | Refills: 0 | DISCHARGE
Start: 2024-04-09

## 2024-04-09 RX ORDER — PREGABALIN 225 MG/1
1 CAPSULE ORAL
Qty: 0 | Refills: 0 | DISCHARGE
Start: 2024-04-09

## 2024-04-09 RX ORDER — PIOGLITAZONE HYDROCHLORIDE 15 MG/1
1 TABLET ORAL
Refills: 0 | DISCHARGE

## 2024-04-09 RX ORDER — ATORVASTATIN CALCIUM 80 MG/1
1 TABLET, FILM COATED ORAL
Qty: 0 | Refills: 0 | DISCHARGE
Start: 2024-04-09

## 2024-04-09 RX ORDER — MIDODRINE HYDROCHLORIDE 2.5 MG/1
1 TABLET ORAL
Qty: 0 | Refills: 0 | DISCHARGE
Start: 2024-04-09

## 2024-04-09 RX ORDER — ASPIRIN/CALCIUM CARB/MAGNESIUM 324 MG
1 TABLET ORAL
Qty: 0 | Refills: 0 | DISCHARGE
Start: 2024-04-09

## 2024-04-09 RX ORDER — VENLAFAXINE HCL 75 MG
1 CAPSULE, EXT RELEASE 24 HR ORAL
Qty: 0 | Refills: 0 | DISCHARGE
Start: 2024-04-09

## 2024-04-09 RX ORDER — VENLAFAXINE HCL 75 MG
2 CAPSULE, EXT RELEASE 24 HR ORAL
Refills: 0 | DISCHARGE

## 2024-04-09 RX ADMIN — Medication 1 TABLET(S): at 12:48

## 2024-04-09 RX ADMIN — PREGABALIN 1000 MICROGRAM(S): 225 CAPSULE ORAL at 12:49

## 2024-04-09 RX ADMIN — Medication 81 MILLIGRAM(S): at 12:48

## 2024-04-09 RX ADMIN — Medication 75 MILLIGRAM(S): at 12:48

## 2024-04-09 RX ADMIN — Medication 50 MICROGRAM(S): at 05:42

## 2024-04-09 NOTE — PROGRESS NOTE ADULT - SUBJECTIVE AND OBJECTIVE BOX
SUBJECTIVE / OVERNIGHT EVENTS:    patient seen and examined  resting comfortably in bed  no events overnight  Cr better  --------------------------------------------------------------------------------------------  LABS:    04-08    138  |  101  |  19  ----------------------------<  103<H>  5.0   |  22  |  1.24    Ca    9.7      08 Apr 2024 09:19        CAPILLARY BLOOD GLUCOSE            Urinalysis Basic - ( 08 Apr 2024 09:19 )    Color: x / Appearance: x / SG: x / pH: x  Gluc: 103 mg/dL / Ketone: x  / Bili: x / Urobili: x   Blood: x / Protein: x / Nitrite: x   Leuk Esterase: x / RBC: x / WBC x   Sq Epi: x / Non Sq Epi: x / Bacteria: x        RADIOLOGY & ADDITIONAL TESTS:    Imaging Personally Reviewed:  [x] YES  [ ] NO    Consultant(s) Notes Reviewed:  [x] YES  [ ] NO    MEDICATIONS  (STANDING):  aspirin  chewable 81 milliGRAM(s) Oral daily  atorvastatin 80 milliGRAM(s) Oral at bedtime  cyanocobalamin 1000 MICROGram(s) Oral daily  influenza  Vaccine (HIGH DOSE) 0.7 milliLiter(s) IntraMuscular once  levothyroxine 50 MICROGram(s) Oral daily  melatonin 3 milliGRAM(s) Oral at bedtime  midodrine. 5 milliGRAM(s) Oral three times a day  multivitamin/minerals 1 Tablet(s) Oral daily  sodium chloride 0.9%. 1000 milliLiter(s) (50 mL/Hr) IV Continuous <Continuous>  venlafaxine XR. 75 milliGRAM(s) Oral daily    MEDICATIONS  (PRN):  acetaminophen     Tablet .. 650 milliGRAM(s) Oral every 6 hours PRN Temp greater or equal to 38C (100.4F), Mild Pain (1 - 3), Moderate Pain (4 - 6), Severe Pain (7 - 10)      Care Discussed with Consultants/Other Providers [x] YES  [ ] NO    Vital Signs Last 24 Hrs  T(C): 36.3 (09 Apr 2024 05:07), Max: 36.9 (08 Apr 2024 20:59)  T(F): 97.4 (09 Apr 2024 05:07), Max: 98.4 (08 Apr 2024 20:59)  HR: 89 (09 Apr 2024 05:07) (75 - 89)  BP: 171/71 (09 Apr 2024 05:07) (154/71 - 171/71)  BP(mean): --  RR: 18 (09 Apr 2024 05:07) (18 - 18)  SpO2: 99% (09 Apr 2024 05:07) (98% - 99%)    Parameters below as of 09 Apr 2024 05:07  Patient On (Oxygen Delivery Method): room air      I&O's Summary    08 Apr 2024 07:01  -  09 Apr 2024 07:00  --------------------------------------------------------  IN: 0 mL / OUT: 200 mL / NET: -200 mL    PHYSICAL EXAM:  GENERAL: NAD, AAOx3  HEAD:  Atraumatic, Normocephalic  EYES: EOMI; conjunctiva and sclera clear  NECK: Supple, No JVD, No LAD  CHEST/LUNG: B/L air entry; No wheezes, rales or rhonci   HEART: Regular rate and rhythm; No murmurs, rubs, or gallops  ABDOMEN: Soft, Nontender, Nondistended; Bowel sounds present  EXTREMITIES:  2+ Peripheral Pulses, No clubbing, cyanosis, or edema  SKIN: No rashes or lesions

## 2024-04-09 NOTE — DISCHARGE NOTE NURSING/CASE MANAGEMENT/SOCIAL WORK - NSDCPEFALRISK_GEN_ALL_CORE
For information on Fall & Injury Prevention, visit: [email or web address] OR  [email or web address] OR  https://www.cdc.gov/steadi/patient.html

## 2024-04-09 NOTE — PROGRESS NOTE ADULT - REASON FOR ADMISSION
Lightheadedness

## 2024-04-09 NOTE — PROGRESS NOTE ADULT - ASSESSMENT
83F with PMHx of hypothyroidism, dementia (AOx1-2 at baseline), depression and orthostatic hypotension presenting for lightheadedness when walking down the stairs.     Plan:    # Presyncope:   - Likely vagally mediated and orthostasis  - CXR w/ clear lungs  - CTH w/ No evidence of acute intracranial hemorrhage or midline shift  - EEG with no seizure activity   - Orthos-> positive  - PT eval -> [facility]  - C/w Midodrine  - TTE 4/3 revealing EF 64%, no regional wall abnormalities  - Cards following-> - Orthostasis likely chronic and patient asymptomatic, no need to cont to trend    # PARVEEN:   - Monitor I/O's  - Serial Cr  - Avoid nephrotoxins  - Renally dose medications  - Renal following    # HLD:  - C/w Statin    # Hypothyroidism:  - C/w Synthroid    # Depression:  - C/w Venlafaxine    # GI ppx:  - Bowel regimen prn    # DVT ppx:  - IPC's    DC planning to [facility]    [employer]  [phone]

## 2024-04-09 NOTE — PROGRESS NOTE ADULT - NS ATTEND AMEND GEN_ALL_CORE FT
Patient care and plan discussed and reviewed with Advanced Care Provider. Plan as outlined above edited by me to reflect our discussion.
as above
as above

## 2024-04-09 NOTE — PROGRESS NOTE ADULT - SUBJECTIVE AND OBJECTIVE BOX
DATE OF SERVICE: 04-09-24 @ 15:33    Patient is a 83y old  Female who presents with a chief complaint of Lightheadedness (09 Apr 2024 10:23)      INTERVAL HISTORY: No acute events    REVIEW OF SYSTEMS:  CONSTITUTIONAL: No weakness  EYES/ENT: No visual changes;  No throat pain   NECK: No pain or stiffness  RESPIRATORY: No cough, wheezing; No shortness of breath  CARDIOVASCULAR: No chest pain or palpitations  GASTROINTESTINAL: No abdominal  pain. No nausea, vomiting, or hematemesis  GENITOURINARY: No dysuria, frequency or hematuria  NEUROLOGICAL: No stroke like symptoms  SKIN: No rashes      	  MEDICATIONS:  midodrine. 5 milliGRAM(s) Oral three times a day        PHYSICAL EXAM:  T(C): 36.7 (04-09-24 @ 11:04), Max: 36.9 (04-08-24 @ 20:59)  HR: 109 (04-09-24 @ 11:04) (85 - 109)  BP: 167/76 (04-09-24 @ 11:04) (156/75 - 171/71)  RR: 18 (04-09-24 @ 11:04) (18 - 18)  SpO2: 97% (04-09-24 @ 11:04) (97% - 99%)  Wt(kg): --  I&O's Summary    08 Apr 2024 07:01  -  09 Apr 2024 07:00  --------------------------------------------------------  IN: 0 mL / OUT: 200 mL / NET: -200 mL          Appearance: In no distress	  HEENT:    PERRL, EOMI	  Cardiovascular:  S1 S2, No JVD  Respiratory: Lungs clear to auscultation	  Gastrointestinal:  Soft, Non-tender, + BS	  Vascularature:  No edema of LE  Psychiatric: Appropriate affect   Neuro: no acute focal deficits           04-08    138  |  101  |  19  ----------------------------<  103<H>  5.0   |  22  |  1.24    Ca    9.7      08 Apr 2024 09:19          Labs personally reviewed      ASSESSMENT/PLAN: 	    1. Problem/Plan:  Problem: Orthostatic dizziness   - Remains positive on 4/1  - Unable to explain or report symptoms during exam  - Continue Midodrine 5mg PO TID  - TTE 4/3 revealing EF 64%, no regional wall abnormalities   - Orthostasis likely chronic and patient asymptomatic. No need to continue to trend.     2. Problem/Plan:  Problem: Hyperlipidemia   - Continue Atorvastatin 80mg PO daily     3. Problem/Plan:  Problem: DVT ppx  - Currently on hold     No further cardiac inpatient workup or medication adjustment at this time.             JOI Shearer DO [facility]  Cardiovascular Medicine  [address] [place] [address], [address]  Available via call or text on [employer]  Office: [phone]

## 2024-04-09 NOTE — PROGRESS NOTE ADULT - SUBJECTIVE AND OBJECTIVE BOX
[facility]   MD SAAD HERNANDEZ MD RUORU WONG, PA    TEL:  FROM 9 AM to 5 PM ---OFFICE: [phone]  AVAILABLE ON TEAMS    FROM 5 PM - 9 AM PLEASE CALL ANSWERING SERVICE: [phone]    RENAL FOLLOW UP NOTE--Date of Service 04-09-24 @ 09:25  --------------------------------------------------------------------------------  HPI:      Pt seen and examined at bedside.     PAST HISTORY  --------------------------------------------------------------------------------  No significant changes to PMH, PSH, FHx, SHx, unless otherwise noted    ALLERGIES & MEDICATIONS  --------------------------------------------------------------------------------  Allergies    Drug Allergies Not Recorded  Seafood (Other (Mild))    Intolerances      Standing Inpatient Medications  aspirin  chewable 81 milliGRAM(s) Oral daily  atorvastatin 80 milliGRAM(s) Oral at bedtime  cyanocobalamin 1000 MICROGram(s) Oral daily  influenza  Vaccine (HIGH DOSE) 0.7 milliLiter(s) IntraMuscular once  levothyroxine 50 MICROGram(s) Oral daily  melatonin 3 milliGRAM(s) Oral at bedtime  midodrine. 5 milliGRAM(s) Oral three times a day  multivitamin/minerals 1 Tablet(s) Oral daily  sodium chloride 0.9%. 1000 milliLiter(s) IV Continuous <Continuous>  venlafaxine XR. 75 milliGRAM(s) Oral daily    PRN Inpatient Medications  acetaminophen     Tablet .. 650 milliGRAM(s) Oral every 6 hours PRN      REVIEW OF SYSTEMS  --------------------------------------------------------------------------------  General: no fever  MSK: no edema     VITALS/PHYSICAL EXAM  --------------------------------------------------------------------------------  T(C): 36.3 (04-09-24 @ 05:07), Max: 36.9 (04-08-24 @ 20:59)  HR: 89 (04-09-24 @ 05:07) (75 - 89)  BP: 171/71 (04-09-24 @ 05:07) (154/71 - 171/71)  RR: 18 (04-09-24 @ 05:07) (18 - 18)  SpO2: 99% (04-09-24 @ 05:07) (98% - 99%)  Wt(kg): --        04-08-24 @ 07:01  -  04-09-24 @ 07:00  --------------------------------------------------------  IN: 0 mL / OUT: 200 mL / NET: -200 mL      Physical Exam:  	Gen: NAD  	HEENT: MMM  	Pulm: CTA B/L  	CV: S1S2  	Abd: Soft, +BS  	Ext: No LE edema B/L                      Neuro: Awake   	Skin: Warm and Dry   	Vascular access: NO HD catheter            no  boogie  LABS/STUDIES  --------------------------------------------------------------------------------    138  |  101  |  19  ----------------------------<  103      [04-08-24 @ 09:19]  5.0   |  22  |  1.24        Ca     9.7     [04-08-24 @ 09:19]            Creatinine Trend:  SCr 1.24 [04-08 @ 09:19]  SCr 1.52 [04-05 @ 04:48]  SCr 1.47 [04-04 @ 07:24]  SCr 1.32 [04-03 @ 07:17]  SCr 1.34 [04-02 @ 07:07]    Urinalysis - [04-08-24 @ 09:19]      Color  / Appearance  / SG  / pH       Gluc 103 / Ketone   / Bili  / Urobili        Blood  / Protein  / Leuk Est  / Nitrite       RBC  / WBC  / Hyaline  / Gran  / Sq Epi  / Non Sq Epi  / Bacteria       Vitamin D (25OH) 24.1      [02-29-24 @ 07:51]  TSH 0.50      [02-28-24 @ 12:16]  Lipid: chol 196, TG 70, HDL 65, LDL --      [02-29-24 @ 07:51]

## 2024-04-09 NOTE — DISCHARGE NOTE NURSING/CASE MANAGEMENT/SOCIAL WORK - PATIENT PORTAL LINK FT
You can access the FollowMyHealth Patient Portal offered by [facility] by registering at the following website: [email or web address]. By joining [facility]’s FollowMyHealth portal, you will also be able to view your health information using other applications (apps) compatible with our system.

## 2024-04-09 NOTE — PROGRESS NOTE ADULT - NUTRITIONAL ASSESSMENT
This patient has been assessed with a concern for Malnutrition and has been determined to have a diagnosis/diagnoses of Severe protein-calorie malnutrition and Underweight (BMI < 19).    This patient is being managed with:   Diet Regular-  Entered: Mar 31 2024  4:41PM  

## 2024-04-09 NOTE — PROGRESS NOTE ADULT - PROVIDER SPECIALTY LIST ADULT
Cardiology
Internal Medicine
Nephrology
Neurology
Internal Medicine
Internal Medicine
Nephrology
Nephrology
Cardiology
Internal Medicine
Internal Medicine
Nephrology
Neurology
Internal Medicine
Internal Medicine

## 2025-02-11 NOTE — PATIENT PROFILE ADULT - NS PRO AD PATIENT TYPE ON CHART
[facility]  Hospitalist Note    Reviewed vital signs. Blood pressure remains elevated and hard to control. Labs reviewed with improved creatinine.     Closed non-displaced fracture of second metatarsal s/p left foot metatarsal joint fusion, ORIF  Dysuria  Right and left shoulder pain due to OA: s/p Kenolog/lidocaine injection on 12/28 by ortho; MRI done for left shoulder 2023 showed \"Full-thickness cartilage loss involving the glenoid and humeral head. Osteophytes. Diffuse labral tears\"  Hypertension, uncontrolled and resistant ; possible cushing given steroid use: per family, patient was well controlled at home on single agent prior to admission, possible component of stress/pain playing role in elevated BP, also chronic steroid likely cushing   Lower extremity swelling  DM II, complicated by hypoglycemia, resolved  Chronic kidney disease stage II-IIIa: stable Cr around 1.2-1.3  Acute urinary retention, resolved  Neurogenic bladder: hold tolterodine; continue flomax   Dysuria, ongoing: has chronic dysuria since end of November; cultures negative  Vaginal yeast infection, treated: treated with fluconazole  HFpEF (EF 72%), stable: monitor weights and I&Os  Hyperlipidemia: continue atorvastatin   MELANIE/severe persistent asthma; continue home inhalers, Singulair  Anxiety: continue cymbalta  RLS: Requip discontinued and received dose of IV ferumoxytol x1; discontinue IV (this was discussed by prior provider on 12/11 with patient's PCP via EPIC)  KOBE and acute blood loss anemia post op, improving: monitor H&H, does not tolerate po iron  Chronic leukocytosis: steroids likely contributing; baseline 13-18  History of PMR: continue prednisone 10 mg; lidocaine patches     Plan:   Therapies per IRP  Continue aspirin and SCDs for DVT prophylaxis  Duplex of LE negative for DVT  Pain control of shoulder with NSAIDs prn, has tolerated ibuprofen in the past; cautious use of NSAIDs due to her CKD  Continue hydralazine 100 mg q8 hours,  clonidine 0.1 mg q8 hours  Amlodipine 5 mg to decrease risk of LE edema; hold losartan due to elevated Cr  Continue furosemide at increased dose.  Restarted carvedilol at lower dose on 2/10  Check orthostatic vitals as she is now weight bearing.  Per family, she does have history of postural hypotension, orthostatics negative 2/11  Discussed with cardiology, plan to continue amlodipine at 5 mg daily; cardiology and endocrinology following and suggested possible Cushings ? Given chronic steroids, BP increase and edema  Plan to resume losartan as creatinine is now stable and needs additional BP control as orthostatics are negative   UA negative for signs of UTI 2/9  Cardiology following to help with blood pressure management and consider secondary causes of HTN; possibly stress related.  Patient's family states that she was controlled on one antihypertensive agent prior to admission (losartan 25 mg)   Discontinue prn ibuprofen though she hasn't used this since 1/29  Strict I&O and daily STANDING weights; unfortunately, accurate outputs not documented making it difficult to assess volume status  Encourage home CPAP machine nightly as untreated MELANIE likely contributing to elevated blood pressure  Does have right foot edema, can consider imaging, however without signs of infection  Ortho transitioned her to weightbearing cast on 2/7  Endocrinology following for diabetic management  Discharge planning per PMR team    Please reach out if any further questions or concerns    DO Pili Gonsales [facility] Hospitalist  Contact by secure chat preferred    Contact the Hospitalist caring for the patient until 7:00 pm.  From 7:00 pm to 7:00 am, contact the Hospitalist on call         Health Care Proxy (HCP)
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
## 2025-03-12 NOTE — ED ADULT NURSE NOTE - PAIN: PRESENCE, MLM
Nursing Transfer Note      3/12/2025   1:15 PM    Nurse giving handoff: NAMITA Jesus RN  Nurse receiving handoff: RAE Varghese RN    Reason patient is being transferred: Stepdown to [facility] Room 944    Transfer From: [facility] Room 9092    Transfer via wheelchair    Transfer with cardiac monitoring    Transported by BRYAN Jesus and LIBBY Cervantes    Transfer Vital Signs:  Blood Pressure:95/53  Heart Rate:87  O2:99  Temperature:98.9  Respirations:20    Telemetry: Box Number [id number]  Order for Tele Monitor? Yes    Additional Lines: 5x drains    Medicines sent: None    Any special needs or follow-up needed: TLSO brace OOB; walker sent with patient    Patient belongings transferred with patient: Yes    Chart send with patient: Yes    Notified: patient to notify family    Patient reassessed at: 3/12/2025 @ 1:15 PM  1  Upon arrival to floor: cardiac monitor applied, patient oriented to room, call bell in reach, and bed in lowest position   denies pain/discomfort (Rating = 0)